# Patient Record
Sex: FEMALE | Race: WHITE | Employment: OTHER | URBAN - METROPOLITAN AREA
[De-identification: names, ages, dates, MRNs, and addresses within clinical notes are randomized per-mention and may not be internally consistent; named-entity substitution may affect disease eponyms.]

---

## 2019-01-16 ENCOUNTER — OP HISTORICAL/CONVERTED ENCOUNTER (OUTPATIENT)
Dept: OTHER | Age: 54
End: 2019-01-16

## 2019-02-01 ENCOUNTER — OP HISTORICAL/CONVERTED ENCOUNTER (OUTPATIENT)
Dept: OTHER | Age: 54
End: 2019-02-01

## 2019-02-06 ENCOUNTER — OP HISTORICAL/CONVERTED ENCOUNTER (OUTPATIENT)
Dept: OTHER | Age: 54
End: 2019-02-06

## 2021-03-08 ENCOUNTER — HOSPITAL ENCOUNTER (EMERGENCY)
Age: 56
Discharge: HOME | End: 2021-03-08
Attending: EMERGENCY MEDICINE
Payer: MEDICARE

## 2021-03-08 VITALS
BODY MASS INDEX: 19.63 KG/M2 | WEIGHT: 115 LBS | RESPIRATION RATE: 18 BRPM | HEIGHT: 64 IN | SYSTOLIC BLOOD PRESSURE: 125 MMHG | HEART RATE: 109 BPM | OXYGEN SATURATION: 98 % | TEMPERATURE: 98.1 F | DIASTOLIC BLOOD PRESSURE: 67 MMHG

## 2021-03-08 VITALS
SYSTOLIC BLOOD PRESSURE: 148 MMHG | RESPIRATION RATE: 18 BRPM | HEART RATE: 89 BPM | OXYGEN SATURATION: 99 % | TEMPERATURE: 98.3 F | BODY MASS INDEX: 19.63 KG/M2 | WEIGHT: 115 LBS | DIASTOLIC BLOOD PRESSURE: 54 MMHG | HEIGHT: 64 IN

## 2021-03-08 DIAGNOSIS — Z87.898 H/O URINARY FREQUENCY: Primary | ICD-10-CM

## 2021-03-08 DIAGNOSIS — R35.0 URINARY FREQUENCY: Primary | ICD-10-CM

## 2021-03-08 LAB
APPEARANCE UR: CLEAR
APPEARANCE UR: CLEAR
BACTERIA URNS QL MICRO: NEGATIVE /HPF
BACTERIA URNS QL MICRO: NEGATIVE /HPF
BILIRUB UR QL: NEGATIVE
BILIRUB UR QL: NEGATIVE
COLOR UR: ABNORMAL
COLOR UR: YELLOW
EPITH CASTS URNS QL MICRO: ABNORMAL /LPF
EPITH CASTS URNS QL MICRO: ABNORMAL /LPF
GLUCOSE UR STRIP.AUTO-MCNC: NEGATIVE MG/DL
GLUCOSE UR STRIP.AUTO-MCNC: NEGATIVE MG/DL
HGB UR QL STRIP: NEGATIVE
HGB UR QL STRIP: NEGATIVE
KETONES UR QL STRIP.AUTO: NEGATIVE MG/DL
KETONES UR QL STRIP.AUTO: NEGATIVE MG/DL
LEUKOCYTE ESTERASE UR QL STRIP.AUTO: NEGATIVE
LEUKOCYTE ESTERASE UR QL STRIP.AUTO: NEGATIVE
MUCOUS THREADS URNS QL MICRO: ABNORMAL /LPF
NITRITE UR QL STRIP.AUTO: NEGATIVE
NITRITE UR QL STRIP.AUTO: NEGATIVE
PH UR STRIP: 5 [PH] (ref 5–8)
PH UR STRIP: 7 [PH] (ref 5–8)
PROT UR STRIP-MCNC: NEGATIVE MG/DL
PROT UR STRIP-MCNC: NEGATIVE MG/DL
RBC #/AREA URNS HPF: ABNORMAL /HPF (ref 0–5)
RBC #/AREA URNS HPF: ABNORMAL /HPF (ref 0–5)
SP GR UR REFRACTOMETRY: 1 (ref 1–1.03)
SP GR UR REFRACTOMETRY: 1.02 (ref 1–1.03)
UA: UC IF INDICATED,UAUC: ABNORMAL
UROBILINOGEN UR QL STRIP.AUTO: 0.1 EU/DL (ref 0.2–1)
UROBILINOGEN UR QL STRIP.AUTO: 0.1 EU/DL (ref 0.2–1)
WBC URNS QL MICRO: ABNORMAL /HPF (ref 0–4)
WBC URNS QL MICRO: ABNORMAL /HPF (ref 0–4)

## 2021-03-08 PROCEDURE — 81001 URINALYSIS AUTO W/SCOPE: CPT

## 2021-03-08 PROCEDURE — 81003 URINALYSIS AUTO W/O SCOPE: CPT

## 2021-03-08 PROCEDURE — 99283 EMERGENCY DEPT VISIT LOW MDM: CPT

## 2021-03-08 RX ORDER — SPIRONOLACTONE 50 MG/1
TABLET, FILM COATED ORAL DAILY
COMMUNITY

## 2021-03-08 RX ORDER — DIVALPROEX SODIUM 250 MG/1
250 TABLET, DELAYED RELEASE ORAL 2 TIMES DAILY
Status: ON HOLD | COMMUNITY
End: 2021-07-23

## 2021-03-08 RX ORDER — GABAPENTIN 400 MG/1
800 CAPSULE ORAL 3 TIMES DAILY
Status: ON HOLD | COMMUNITY
End: 2021-07-23

## 2021-03-08 RX ORDER — TOLTERODINE 2 MG/1
2 CAPSULE, EXTENDED RELEASE ORAL DAILY
Qty: 30 CAP | Refills: 0 | Status: SHIPPED | OUTPATIENT
Start: 2021-03-08 | End: 2021-04-14

## 2021-03-08 RX ORDER — BACLOFEN 10 MG/1
10 TABLET ORAL 3 TIMES DAILY
COMMUNITY

## 2021-03-08 NOTE — ED PROVIDER NOTES
EMERGENCY DEPARTMENT HISTORY AND PHYSICAL EXAM      Date: 3/8/2021  Patient Name: Jorge Baker    History of Presenting Illness     Chief Complaint   Patient presents with    Urinary Frequency       History Provided By: Patient    HPI: Jorge Baker, 61 y.o. female with a past medical history significant No significant past medical history presents to the ED with cc of urinary frequency. Patient went to outside facility on February 22 and was treated for UTI with CEF URL X IMV 20 to 50 mg she took 2 pills and developed diarrhea so on 24 February she is discontinued it. They then placed her on Augmentin but again stopped after about 3 days for diarrhea. On the fifth she was then placed on amoxicillin 500 mg. She developed loose stool each of the last 2 days with some blood and difficulty urinating. She is here because she is having trouble producing urine although denies any bladder fullness or pressure sensation. She denies fevers chills dizziness lightheadedness cough shortness of breath or chest pain. Pt has had cystoscopy 20 plus years ago to dilate her ureter. She     There are no other complaints, changes, or physical findings at this time. PCP: Jesica Marie MD    No current facility-administered medications on file prior to encounter. Current Outpatient Medications on File Prior to Encounter   Medication Sig Dispense Refill    teriflunomide (Aubagio) 7 mg tablet Take 7 mg by mouth daily.  baclofen (LIORESAL) 10 mg tablet Take 10 mg by mouth three (3) times daily. 3 in the morning; 3 at noon, 3 in the evening      spironolactone (ALDACTONE) 50 mg tablet Take  by mouth daily. One at noon and 2 in the evening      divalproex DR (Depakote) 250 mg tablet Take 250 mg by mouth three (3) times daily. 2 in the morning; 2 in the evening      gabapentin (NEURONTIN) 400 mg capsule Take 400 mg by mouth three (3) times daily.  3 in the morning; 3 at noon, 3 in the evening         Past History Past Medical History:  No pertinent PMHX. Past Surgical History:  No pertinent surgical history. Family History:  History reviewed. No pertinent family history. Social History:  Denies smoking, alcohol or drug use. Allergies: Allergies   Allergen Reactions    Ciprofloxacin Rash    Fentanyl Rash    Nitrofurantoin Rash    Nortriptyline Vertigo    Prozac [Fluoxetine] Rash    Sulfa (Sulfonamide Antibiotics) Rash    Wellbutrin [Bupropion] Rash    Zofran [Ondansetron Hcl] Other (comments)     constipation         Review of Systems     Review of Systems   Constitutional: Negative for activity change, appetite change, fatigue and fever. HENT: Negative. Negative for congestion, rhinorrhea and sore throat. Respiratory: Negative. Negative for cough, shortness of breath and wheezing. Cardiovascular: Negative. Negative for chest pain and leg swelling. Gastrointestinal: Negative. Negative for abdominal distention, abdominal pain, constipation, diarrhea, nausea and vomiting. Endocrine: Negative. Genitourinary: Positive for frequency and urgency. Negative for difficulty urinating, dysuria, menstrual problem, vaginal bleeding and vaginal discharge. Musculoskeletal: Negative. Negative for arthralgias, joint swelling and myalgias. Skin: Negative. Negative for rash. Neurological: Negative. Negative for dizziness, weakness, light-headedness and headaches. Psychiatric/Behavioral: Negative. Physical Exam     Physical Exam  Vitals signs and nursing note reviewed. Constitutional:       General: She is not in acute distress. HENT:      Head: Atraumatic. Eyes:      Conjunctiva/sclera: Conjunctivae normal.      Pupils: Pupils are equal, round, and reactive to light. Neck:      Musculoskeletal: Normal range of motion. Cardiovascular:      Rate and Rhythm: Normal rate and regular rhythm. Heart sounds: Normal heart sounds. No murmur.    Pulmonary:      Effort: Pulmonary effort is normal. No respiratory distress. Breath sounds: Normal breath sounds. No wheezing or rales. Chest:      Chest wall: No tenderness. Abdominal:      General: Bowel sounds are normal. There is no distension. Palpations: Abdomen is soft. Tenderness: There is no abdominal tenderness. There is no guarding or rebound. Musculoskeletal: Normal range of motion. Skin:     General: Skin is warm. Findings: No erythema or rash. Neurological:      Mental Status: She is alert and oriented to person, place, and time. Lab and Diagnostic Study Results     Labs -     Recent Results (from the past 12 hour(s))   URINALYSIS W/ REFLEX CULTURE    Collection Time: 03/08/21  8:15 AM    Specimen: Urine   Result Value Ref Range    Color Yellow      Appearance Clear Clear      Specific gravity 1.020 1.003 - 1.030      pH (UA) 5.0 5.0 - 8.0      Protein Negative Negative mg/dL    Glucose Negative Negative mg/dL    Ketone Negative Negative mg/dL    Bilirubin Negative Negative      Blood Negative Negative      Urobilinogen 0.1 (L) 0.2 - 1.0 EU/dL    Nitrites Negative Negative      Leukocyte Esterase Negative Negative      WBC 0-4 0 - 4 /hpf    RBC 0-5 0 - 5 /hpf    Epithelial cells Few Few /lpf    Bacteria Negative Negative /hpf    UA:UC IF INDICATED Culture not indicated by UA result Culture not indicated by UA result      Mucus 2+ (A) Negative /lpf       Radiologic Studies -   @lastxrresult@  CT Results  (Last 48 hours)    None        CXR Results  (Last 48 hours)    None            Medical Decision Making   - I am the first provider for this patient. - I reviewed the vital signs, available nursing notes, past medical history, past surgical history, family history and social history. - Initial assessment performed. The patients presenting problems have been discussed, and they are in agreement with the care plan formulated and outlined with them.   I have encouraged them to ask questions as they arise throughout their visit. Vital Signs-Reviewed the patient's vital signs. Patient Vitals for the past 12 hrs:   Temp Pulse Resp BP SpO2   03/08/21 0807 98.1 °F (36.7 °C) (!) 109 18 125/67 98 %       Records Reviewed: Nursing Notes    The patient presents with Urinary frequency with a differential diagnosis of UTI. ED Course:          Provider Notes (Medical Decision Making):     MDM UTI, low suspicion for STD, no abd pain, no vaginal discharge. Will obtian UA. Will obtain culture results from [facility] should we need to re-prescribe antibiotics. Pt has been on three antibiotics in past three weeks. Believe her sense of not being able to urinate more likely decreased po intake and maybe some loss w diarrhea as she has no bladder fullness or pressure. Procedures       Disposition   Disposition: DC- Adult Discharges: All of the diagnostic tests were reviewed and questions answered. Diagnosis, care plan and treatment options were discussed. The patient understands the instructions and will follow up as directed. The patients results have been reviewed with them. They have been counseled regarding their diagnosis. The patient verbally convey understanding and agreement of the signs, symptoms, diagnosis, treatment and prognosis and additionally agrees to follow up as recommended with their PCP in 24 - 48 hours. They also agree with the care-plan and convey that all of their questions have been answered. I have also put together some discharge instructions for them that include: 1) educational information regarding their diagnosis, 2) how to care for their diagnosis at home, as well a 3) list of reasons why they would want to return to the ED prior to their follow-up appointment, should their condition change. DISCHARGE PLAN:  1. There are no discharge medications for this patient.     2.   Follow-up Information     Follow up With Specialties Details Why Contact Serenity Nunez., MD Urology In 2 days  [place] [id number]  [phone]      [address] Emergency Medicine  As needed, If symptoms worsen [address]  [phone]        3. Return to ED if worse   4. Current Discharge Medication List            Diagnosis     Clinical Impression:   1. H/O urinary frequency        Attestations:    Oscar Hernandez MD    Please note that this dictation was completed with [employer], the computer voice recognition software. Quite often unanticipated grammatical, syntax, homophones, and other interpretive errors are inadvertently transcribed by the computer software. Please disregard these errors. Please excuse any errors that have escaped final proofreading. Thank you.

## 2021-03-08 NOTE — ED TRIAGE NOTES
Is on her third round of antibiotics that she started on Friday for a urinary tract infection; now feels like she cannot void

## 2021-03-09 NOTE — ED PROVIDER NOTES
EMERGENCY DEPARTMENT HISTORY AND PHYSICAL EXAM      Date: 3/8/2021  Patient Name: Casandra Alfaro    History of Presenting Illness     Chief Complaint   Patient presents with    Urinary Retention       History Provided By: Patient    HPI: Casandra Alfaro, 61 y.o. female   presents to the ED with cc of urinary frequency. Patient was seen earlier in this emergency for the same came back to the emergency room with a persistent urinary frequency for last several days. Patient has history of multiple UTI and last time that she was treated with antibiotic for UTI was several weeks ago. No dysuria. No vaginal discharge. No hematuria. No back pain. No fever chills. Patient was discharged earlier without antibiotic. Patient has history of MS without exacerbation for many years. PCP: Santana Phelan MD    No current facility-administered medications on file prior to encounter. Current Outpatient Medications on File Prior to Encounter   Medication Sig Dispense Refill    teriflunomide (Aubagio) 7 mg tablet Take 7 mg by mouth daily.  baclofen (LIORESAL) 10 mg tablet Take 10 mg by mouth three (3) times daily. 3 in the morning; 3 at noon, 3 in the evening      spironolactone (ALDACTONE) 50 mg tablet Take  by mouth daily. One at noon and 2 in the evening      divalproex DR (Depakote) 250 mg tablet Take 250 mg by mouth three (3) times daily. 2 in the morning; 2 in the evening      gabapentin (NEURONTIN) 400 mg capsule Take 400 mg by mouth three (3) times daily. 3 in the morning; 3 at noon, 3 in the evening         Past History     Past Medical History:  Past Medical History:   Diagnosis Date    Arthritis     GERD (gastroesophageal reflux disease)     MS (multiple sclerosis) ([facility] [address]        Past Surgical History:  No past surgical history on file. Family History:  History reviewed. No pertinent family history.     Social History:  Social History     Tobacco Use    Smoking status: Never Smoker    Smokeless tobacco: Never Used   Substance Use Topics    Alcohol use: Never     Frequency: Never    Drug use: Never       Allergies: Allergies   Allergen Reactions    Ciprofloxacin Rash    Fentanyl Rash    Nitrofurantoin Rash    Nortriptyline Vertigo    Prozac [Fluoxetine] Rash    Sulfa (Sulfonamide Antibiotics) Rash    Wellbutrin [Bupropion] Rash    Zofran [Ondansetron Hcl] Other (comments)     constipation         Review of Systems   Review of Systems   Constitutional: Negative for chills and fever. Respiratory: Negative for shortness of breath. Cardiovascular: Negative for chest pain. Gastrointestinal: Negative for nausea. Endocrine: Positive for polyuria. Genitourinary: Negative for dysuria. Skin: Negative for rash. Neurological: Negative for headaches. Physical Exam   Physical Exam  Vitals signs and nursing note reviewed. Constitutional:       Appearance: Normal appearance. HENT:      Head: Normocephalic and atraumatic. Nose: Nose normal.      Mouth/Throat:      Mouth: Mucous membranes are moist.      Pharynx: Oropharynx is clear. Eyes:      Conjunctiva/sclera: Conjunctivae normal.   Neck:      Musculoskeletal: Neck supple. Cardiovascular:      Rate and Rhythm: Normal rate and regular rhythm. Heart sounds: Normal heart sounds. Pulmonary:      Effort: Pulmonary effort is normal.      Breath sounds: Normal breath sounds. Abdominal:      General: Abdomen is flat. Bowel sounds are normal.      Palpations: Abdomen is soft. Musculoskeletal:      Right lower leg: No edema. Left lower leg: No edema. Skin:     General: Skin is warm and dry. Neurological:      General: No focal deficit present. Mental Status: She is alert and oriented to person, place, and time.    Psychiatric:         Mood and Affect: Mood normal.         Diagnostic Study Results     Labs -     Recent Results (from the past 12 hour(s))   URINALYSIS W/ RFLX MICROSCOPIC    Collection Time: 03/08/21  7:15 PM   Result Value Ref Range    Color Yellow/Straw      Appearance Clear Clear      Specific gravity 1.005 1.003 - 1.030      pH (UA) 7.0 5.0 - 8.0      Protein Negative Negative mg/dL    Glucose Negative Negative mg/dL    Ketone Negative Negative mg/dL    Bilirubin Negative Negative      Blood Negative Negative      Urobilinogen 0.1 (L) 0.2 - 1.0 EU/dL    Nitrites Negative Negative      Leukocyte Esterase Negative Negative      WBC 0-4 0 - 4 /hpf    RBC 0-5 0 - 5 /hpf    Epithelial cells Few Few /lpf    Bacteria Negative Negative /hpf       Radiologic Studies -   No orders to display     CT Results  (Last 48 hours)    None        CXR Results  (Last 48 hours)    None            Medical Decision Making   I am the first provider for this patient. I reviewed the vital signs, available nursing notes, past medical history, past surgical history, family history and social history. Vital Signs-Reviewed the patient's vital signs. Patient Vitals for the past 12 hrs:   Temp Pulse Resp BP SpO2   03/08/21 1855 98.3 °F (36.8 °C) 89 18 (!) 148/54 99 %       Records Reviewed:     Provider Notes (Medical Decision Making):       ED Course:   Initial assessment performed. The patients presenting problems have been discussed, and they are in agreement with the care plan formulated and outlined with them. I have encouraged them to ask questions as they arise throughout their visit. The Bladder scan before urination was 150 mL. The Post void bladder scan was 0 ml      PROCEDURES      Disposition: Condition stable   DC- Adult Discharges: All of the diagnostic tests were reviewed and questions answered. Diagnosis, care plan and treatment options were discussed. understand instructions and will follow up as directed. The patients results have been reviewed with them. They have been counseled regarding their diagnosis.   The patient verbally convey understanding and agreement of the signs, symptoms, diagnosis, treatment and prognosis and additionally agrees to follow up as recommended. They also agree with the care-plan and convey that all of their questions have been answered. I have also put together some discharge instructions for them that include: 1) educational information regarding their diagnosis, 2) how to care for their diagnosis at home, as well a 3) list of reasons why they would want to return to the ED prior to their follow-up appointment, should their condition change. PLAN:  1. Current Discharge Medication List      START taking these medications    Details   tolterodine ER (Detrol LA) 2 mg ER capsule Take 1 Cap by mouth daily. Qty: 30 Cap, Refills: 0           2. Follow-up Information     Follow up With Specialties Details Why Contact Info    Follow up with your primary care physician  Schedule an appointment as soon as possible for a visit in 3 days As needed         Return to ED if worse     Diagnosis     Clinical Impression:   1. Urinary frequency        Please note that this dictation was completed with [employer], the computer voice recognition software. Quite often unanticipated grammatical, syntax, homophones, and other interpretive errors are inadvertently transcribed by the computer software. Please disregard these errors. Please excuse any errors that have escaped final proofreading. Thank you.

## 2021-03-12 ENCOUNTER — HOSPITAL ENCOUNTER (EMERGENCY)
Age: 56
Discharge: HOME | End: 2021-03-12
Attending: EMERGENCY MEDICINE
Payer: MEDICARE

## 2021-03-12 VITALS
TEMPERATURE: 98.1 F | OXYGEN SATURATION: 99 % | SYSTOLIC BLOOD PRESSURE: 116 MMHG | DIASTOLIC BLOOD PRESSURE: 63 MMHG | BODY MASS INDEX: 19.63 KG/M2 | RESPIRATION RATE: 18 BRPM | HEIGHT: 64 IN | HEART RATE: 110 BPM | WEIGHT: 115 LBS

## 2021-03-12 DIAGNOSIS — N39.0 URINARY TRACT INFECTION WITHOUT HEMATURIA, SITE UNSPECIFIED: Primary | ICD-10-CM

## 2021-03-12 LAB
AMORPH CRY URNS QL MICRO: ABNORMAL
APPEARANCE UR: CLEAR
BACTERIA URNS QL MICRO: ABNORMAL /HPF
BILIRUB UR QL: NEGATIVE
COLOR UR: YELLOW
EPITH CASTS URNS QL MICRO: ABNORMAL /LPF
GLUCOSE UR STRIP.AUTO-MCNC: NEGATIVE MG/DL
HGB UR QL STRIP: NEGATIVE
KETONES UR QL STRIP.AUTO: NEGATIVE MG/DL
LEUKOCYTE ESTERASE UR QL STRIP.AUTO: ABNORMAL
NITRITE UR QL STRIP.AUTO: NEGATIVE
PH UR STRIP: 5 [PH] (ref 5–8)
PROT UR STRIP-MCNC: NEGATIVE MG/DL
RBC #/AREA URNS HPF: ABNORMAL /HPF (ref 0–5)
SP GR UR REFRACTOMETRY: 1.02 (ref 1–1.03)
UA: UC IF INDICATED,UAUC: ABNORMAL
UROBILINOGEN UR QL STRIP.AUTO: 0.1 EU/DL (ref 0.2–1)
WBC URNS QL MICRO: ABNORMAL /HPF (ref 0–4)

## 2021-03-12 PROCEDURE — 81001 URINALYSIS AUTO W/SCOPE: CPT

## 2021-03-12 PROCEDURE — 51798 US URINE CAPACITY MEASURE: CPT

## 2021-03-12 PROCEDURE — 99284 EMERGENCY DEPT VISIT MOD MDM: CPT

## 2021-03-12 RX ORDER — PHENAZOPYRIDINE HYDROCHLORIDE 200 MG/1
200 TABLET, FILM COATED ORAL 3 TIMES DAILY
Qty: 6 TAB | Refills: 0 | Status: SHIPPED | OUTPATIENT
Start: 2021-03-12 | End: 2021-03-14

## 2021-03-12 RX ORDER — CEPHALEXIN 500 MG/1
500 CAPSULE ORAL 2 TIMES DAILY
Qty: 14 CAP | Refills: 0 | Status: SHIPPED | OUTPATIENT
Start: 2021-03-12 | End: 2021-03-19

## 2021-03-12 NOTE — ED TRIAGE NOTES
\" I have had urinary retention, I have been having UTI's and have a urology appointment in April but I think its worse \"

## 2021-03-12 NOTE — DISCHARGE INSTRUCTIONS
Take the cephalexin as prescribed for a urinary tract infection. Take the pyridium as prescribed. Drink plenty of fluids. Follow-up with Urology as soon as possible. Also recommend that you take probiotics to help prevent diarrhea while taking antibiotics.

## 2021-03-12 NOTE — ED PROVIDER NOTES
EMERGENCY DEPARTMENT HISTORY AND PHYSICAL EXAM      Date: 3/12/2021  Patient Name: Nohemi Yepez    History of Presenting Illness     Chief Complaint   Patient presents with    Urinary Retention       History Provided By: Patient    HPI: Nohemi Yepez, 61 y.o. female with a past medical history significant MS presents to the ED with cc of difficultly urinating for the past 5 days. Patient reports mild dysuria. Reports urinary urgency, suprapubic pressure with urination and having to strain to urinate. Has been on 3 courses of abx since 2/26/21 but has not been able to finish an entire course due to diarrhea. Denies fevers, chills, nausea, or vomiting. Denies abdominal pain or back/flank pain. States her neurologist instructed her to stop detrol prescribed at ED on 3/8/21. Has an appointment with a Urologist at the end of next month. There are no other complaints, changes, or physical findings at this time. PCP: Jeannette Newman MD    No current facility-administered medications on file prior to encounter. Current Outpatient Medications on File Prior to Encounter   Medication Sig Dispense Refill    teriflunomide (Aubagio) 7 mg tablet Take 7 mg by mouth daily.  baclofen (LIORESAL) 10 mg tablet Take 10 mg by mouth three (3) times daily. 3 in the morning; 3 at noon, 3 in the evening      spironolactone (ALDACTONE) 50 mg tablet Take  by mouth daily. One at noon and 2 in the evening      divalproex DR (Depakote) 250 mg tablet Take 250 mg by mouth three (3) times daily. 2 in the morning; 2 in the evening      gabapentin (NEURONTIN) 400 mg capsule Take 400 mg by mouth three (3) times daily. 3 in the morning; 3 at noon, 3 in the evening      tolterodine ER (Detrol LA) 2 mg ER capsule Take 1 Cap by mouth daily.  30 Cap 0       Past History     Past Medical History:  Past Medical History:   Diagnosis Date    Arthritis     GERD (gastroesophageal reflux disease)     [facility]     UTI (urinary tract infection)        Past Surgical History:  History reviewed. No pertinent surgical history. Family History:  History reviewed. No pertinent family history. Social History:  Social History     Tobacco Use    Smoking status: Never Smoker    Smokeless tobacco: Never Used   Substance Use Topics    Alcohol use: Never     Frequency: Never    Drug use: Never       Allergies: Allergies   Allergen Reactions    Ciprofloxacin Rash    Fentanyl Rash    Nitrofurantoin Rash    Nortriptyline Vertigo    Prozac [Fluoxetine] Rash    Sulfa (Sulfonamide Antibiotics) Rash    Wellbutrin [Bupropion] Rash    Zofran [Ondansetron Hcl] Other (comments)     constipation         Review of Systems   Review of Systems   Constitutional: Negative for chills and fever. HENT: Negative for congestion and sore throat. Eyes: Negative for pain and redness. Respiratory: Negative for cough and shortness of breath. Cardiovascular: Negative for chest pain and leg swelling. Gastrointestinal: Positive for diarrhea. Negative for abdominal pain, nausea and vomiting. Diarrhea when taking the antibiotics   Genitourinary: Positive for dysuria and urgency. Negative for hematuria. Musculoskeletal: Negative for back pain and myalgias. Skin: Negative for pallor and rash. Neurological: Negative for dizziness, numbness and headaches. Endo/Heme/Allergies: Negative for cold intolerance and heat intolerance. Psychiatric/Behavioral: Negative for agitation and dysphoric mood. Physical Exam     Physical Exam  Vitals signs and nursing note reviewed. Constitutional:       General: She is not in acute distress. Appearance: Normal appearance. She is not ill-appearing or toxic-appearing. HENT:      Head: Normocephalic and atraumatic. Mouth/Throat:      Mouth: Mucous membranes are moist.      Pharynx: Oropharynx is clear. Eyes:      Extraocular Movements: Extraocular movements intact. Conjunctiva/sclera: Conjunctivae normal.      Pupils: Pupils are equal, round, and reactive to light. Neck:      Musculoskeletal: Neck supple. No neck rigidity or muscular tenderness. Cardiovascular:      Rate and Rhythm: Normal rate and regular rhythm. Pulses: Normal pulses. Heart sounds: Normal heart sounds. Pulmonary:      Effort: Pulmonary effort is normal.      Breath sounds: Normal breath sounds. Abdominal:      General: Abdomen is flat. There is no distension. Palpations: Abdomen is soft. There is no mass. Tenderness: There is no abdominal tenderness. There is no guarding or rebound. Musculoskeletal: Normal range of motion. General: No swelling or tenderness. Lymphadenopathy:      Cervical: No cervical adenopathy. Skin:     General: Skin is warm and dry. Findings: No erythema or rash. Neurological:      General: No focal deficit present. Mental Status: She is alert and oriented to person, place, and time.    Psychiatric:         Behavior: Behavior normal.         Diagnostic Study Results     Labs -     Recent Results (from the past 12 hour(s))   URINALYSIS W/ REFLEX CULTURE    Collection Time: 03/12/21  8:45 AM    Specimen: Urine   Result Value Ref Range    Color Yellow      Appearance Clear Clear      Specific gravity 1.020 1.003 - 1.030      pH (UA) 5.0 5.0 - 8.0      Protein Negative Negative mg/dL    Glucose Negative Negative mg/dL    Ketone Negative Negative mg/dL    Bilirubin Negative Negative      Blood Negative Negative      Urobilinogen 0.1 (L) 0.2 - 1.0 EU/dL    Nitrites Negative Negative      Leukocyte Esterase Large (A) Negative      WBC 5-10 0 - 4 /hpf    RBC 0-5 0 - 5 /hpf    Epithelial cells Moderate (A) Few /lpf    Bacteria 1+ (A) Negative /hpf    UA:UC IF INDICATED Culture not indicated by UA result Culture not indicated by UA result      Amorphous Crystals Few (A) Negative         Radiologic Studies -   @lastxrresult@  CT Results  (Last 48 hours)    None        CXR Results  (Last 48 hours)    None            Medical Decision Making   I am the first provider for this patient. I reviewed the vital signs, available nursing notes, past medical history, past surgical history, family history and social history. Vital Signs-Reviewed the patient's vital signs. Patient Vitals for the past 12 hrs:   Temp Pulse Resp BP SpO2   03/12/21 0825 98.1 °F (36.7 °C) (!) 110 18 116/63 99 %       Records Reviewed: Nursing Notes          Provider Notes (Medical Decision Making):   Bladder scan revealed 40cc urine in the bladder. Patient states only drinking enough water to take her medications. Last 2 UA on 3/8 did not reveal UTI. Patient needs to see a Urologist. Would recommend that she speaks to her PCP who she has not yet seen for this issue can help her get an appointment that is sooner than the end of next month. [facility]         ED Course:   Initial assessment performed. The patients presenting problems have been discussed, and they are in agreement with the care plan formulated and outlined with them. I have encouraged them to ask questions as they arise throughout their visit. PROCEDURES  Procedures       PLAN:  1. Current Discharge Medication List      START taking these medications    Details   cephALEXin (Keflex) 500 mg capsule Take 1 Cap by mouth two (2) times a day for 7 days. Qty: 14 Cap, Refills: 0      phenazopyridine (Pyridium) 200 mg tablet Take 1 Tab by mouth three (3) times daily for 2 days. Qty: 6 Tab, Refills: 0           2. Follow-up Information     Follow up With Specialties Details Why [address] [facility]  In 3 days  [address]  Whitney Faye MD Family Medicine In 3 days  [address] [facility]  [id number] [phone]          Return to ED if worse     Diagnosis     Clinical Impression:   1.  Urinary tract infection without hematuria, site unspecified

## 2021-03-12 NOTE — ED NOTES
Pt reports her neurologist advised her to stop taking the tolterodine tart 2mg ER for bladder spasms because it may have made the retention worse.

## 2021-03-27 ENCOUNTER — HOSPITAL ENCOUNTER (EMERGENCY)
Age: 56
Discharge: HOME | End: 2021-03-27
Attending: FAMILY MEDICINE
Payer: MEDICARE

## 2021-03-27 VITALS
RESPIRATION RATE: 18 BRPM | SYSTOLIC BLOOD PRESSURE: 101 MMHG | BODY MASS INDEX: 21.18 KG/M2 | TEMPERATURE: 98.4 F | WEIGHT: 115.08 LBS | HEIGHT: 62 IN | OXYGEN SATURATION: 99 % | DIASTOLIC BLOOD PRESSURE: 74 MMHG | HEART RATE: 104 BPM

## 2021-03-27 DIAGNOSIS — R30.0 DYSURIA: Primary | ICD-10-CM

## 2021-03-27 LAB
APPEARANCE UR: CLEAR
BACTERIA URNS QL MICRO: NEGATIVE /HPF
BILIRUB UR QL: NEGATIVE
COLOR UR: YELLOW
EPITH CASTS URNS QL MICRO: ABNORMAL /LPF
GLUCOSE UR STRIP.AUTO-MCNC: NEGATIVE MG/DL
HGB UR QL STRIP: NEGATIVE
KETONES UR QL STRIP.AUTO: NEGATIVE MG/DL
LEUKOCYTE ESTERASE UR QL STRIP.AUTO: NEGATIVE
NITRITE UR QL STRIP.AUTO: NEGATIVE
PH UR STRIP: 6 [PH] (ref 5–8)
PROT UR STRIP-MCNC: NEGATIVE MG/DL
RBC #/AREA URNS HPF: ABNORMAL /HPF (ref 0–5)
SP GR UR REFRACTOMETRY: 1.01 (ref 1–1.03)
UA: UC IF INDICATED,UAUC: ABNORMAL
UROBILINOGEN UR QL STRIP.AUTO: 0.1 EU/DL (ref 0.2–1)
WBC URNS QL MICRO: ABNORMAL /HPF (ref 0–4)

## 2021-03-27 PROCEDURE — 81001 URINALYSIS AUTO W/SCOPE: CPT

## 2021-03-27 PROCEDURE — 99283 EMERGENCY DEPT VISIT LOW MDM: CPT

## 2021-03-27 NOTE — ED PROVIDER NOTES
EMERGENCY DEPARTMENT HISTORY AND PHYSICAL EXAM      Date: 3/27/2021  Patient Name: Tuan Donis    History of Presenting Illness     Chief Complaint   Patient presents with    Bladder Infection       History Provided By:     HPI: Tuan Donis, is an extremely pleasant 61 y.o. female presenting to the ED with a chief complaint of dysuria. She states she has had 3 urinary tract infections over the last month. She states this feels similar. She states she has painful urination and a sensation of fullness in her bladder however she is able to void without much difficulty. She denies any fevers, chills nor rigors. She denies any flank pain. She denies any abdominal pain. She denies vaginal discharge. She has not been experiencing any hematuria. She states she currently has an appointment with her urologist scheduled within the next week. She denies drugs alcohol or tobacco.    There are no other complaints, changes, or physical findings at this time. PCP: Thalia Henderson MD    No current facility-administered medications on file prior to encounter. Current Outpatient Medications on File Prior to Encounter   Medication Sig Dispense Refill    teriflunomide (Aubagio) 7 mg tablet Take 7 mg by mouth daily.  baclofen (LIORESAL) 10 mg tablet Take 10 mg by mouth three (3) times daily. 3 in the morning; 3 at noon, 3 in the evening      spironolactone (ALDACTONE) 50 mg tablet Take  by mouth daily. One at noon and 2 in the evening      divalproex DR (Depakote) 250 mg tablet Take 250 mg by mouth three (3) times daily. 2 in the morning; 2 in the evening      gabapentin (NEURONTIN) 400 mg capsule Take 400 mg by mouth three (3) times daily. 3 in the morning; 3 at noon, 3 in the evening      tolterodine ER (Detrol LA) 2 mg ER capsule Take 1 Cap by mouth daily.  30 Cap 0       Past History     Past Medical History:  Past Medical History:   Diagnosis Date    Arthritis     GERD (gastroesophageal reflux disease)     [facility]     UTI (urinary tract infection)        Past Surgical History:  History reviewed. No pertinent surgical history. Family History:  History reviewed. No pertinent family history. Social History:  Social History     Tobacco Use    Smoking status: Never Smoker    Smokeless tobacco: Never Used   Substance Use Topics    Alcohol use: Never     Frequency: Never    Drug use: Never       Allergies: Allergies   Allergen Reactions    Ciprofloxacin Rash    Fentanyl Rash    Nitrofurantoin Rash    Nortriptyline Vertigo    Prozac [Fluoxetine] Rash    Sulfa (Sulfonamide Antibiotics) Rash    Wellbutrin [Bupropion] Rash    Zofran [Ondansetron Hcl] Other (comments)     constipation         Review of Systems     Review of Systems   Constitutional: Negative for activity change, appetite change, chills, fatigue and fever. HENT: Negative for congestion and sore throat. Eyes: Negative for photophobia and visual disturbance. Respiratory: Negative for cough, shortness of breath and wheezing. Cardiovascular: Negative for chest pain, palpitations and leg swelling. Gastrointestinal: Negative for abdominal pain, diarrhea, nausea and vomiting. Endocrine: Negative for cold intolerance and heat intolerance. Genitourinary: Positive for dysuria. Negative for decreased urine volume, dyspareunia, flank pain, frequency, pelvic pain and urgency. Musculoskeletal: Negative for gait problem and joint swelling. Skin: Negative for color change and rash. Neurological: Negative for dizziness and headaches. Physical Exam     Physical Exam  Constitutional:       Appearance: She is well-developed. HENT:      Head: Normocephalic and atraumatic. Mouth/Throat:      Mouth: Mucous membranes are moist.      Pharynx: Oropharynx is clear. Eyes:      Conjunctiva/sclera: Conjunctivae normal.      Pupils: Pupils are equal, round, and reactive to light.    Neck: Musculoskeletal: Normal range of motion and neck supple. Cardiovascular:      Rate and Rhythm: Normal rate and regular rhythm. Heart sounds: No murmur. Pulmonary:      Effort: No respiratory distress. Breath sounds: No stridor. No wheezing, rhonchi or rales. Abdominal:      General: There is no distension. Tenderness: There is no abdominal tenderness. There is no guarding or rebound. Musculoskeletal:      Comments: No flank pain   Skin:     General: Skin is warm and dry. Neurological:      General: No focal deficit present. Mental Status: She is alert and oriented to person, place, and time. Psychiatric:         Mood and Affect: Mood normal.         Behavior: Behavior normal.         Lab and Diagnostic Study Results     Labs -     Recent Results (from the past 12 hour(s))   URINALYSIS W/ REFLEX CULTURE    Collection Time: 03/27/21  8:00 AM    Specimen: Urine   Result Value Ref Range    Color Yellow      Appearance Clear Clear      Specific gravity 1.010 1.003 - 1.030      pH (UA) 6.0 5.0 - 8.0      Protein Negative Negative mg/dL    Glucose Negative Negative mg/dL    Ketone Negative Negative mg/dL    Bilirubin Negative Negative      Blood Negative Negative      Urobilinogen 0.1 (L) 0.2 - 1.0 EU/dL    Nitrites Negative Negative      Leukocyte Esterase Negative Negative      WBC 0-4 0 - 4 /hpf    RBC 0-5 0 - 5 /hpf    Epithelial cells Few Few /lpf    Bacteria Negative Negative /hpf    UA:UC IF INDICATED Culture not indicated by UA result Culture not indicated by UA result         Radiologic Studies -   @lastxrresult@  CT Results  (Last 48 hours)    None        CXR Results  (Last 48 hours)    None            Medical Decision Making   - I am the first provider for this patient. - I reviewed the vital signs, available nursing notes, past medical history, past surgical history, family history and social history. - Initial assessment performed.  The patients presenting problems have been discussed, and they are in agreement with the care plan formulated and outlined with them. I have encouraged them to ask questions as they arise throughout their visit. Vital Signs-Reviewed the patient's vital signs. Patient Vitals for the past 12 hrs:   Temp Pulse Resp BP SpO2   03/27/21 0800 98.4 °F (36.9 °C) (!) 104 18 101/74 99 %         ED Course/ Provider Notes (Medical Decision Making):     Patient presented to the emergency department with a chief complaint of dysuria and sensation of full bladder. She is voiding without difficulty. She provided a sample with these. Urinalysis is completely normal.  I did offer her a bladder scan in light of her sensation that her bladder is full but she declines. She states she has an appointment with a urologist in 1 week. She understands the importance of making this appointment. She was discharged home in stable and amatory condition. Geovani Tafoya was given a thorough list of signs and symptoms that would warrant an immediate return to the emergency department. Otherwise Geovani Tafoya will follow up with PCP. Procedures   Medical Decision Makingedical Decision Making  Performed by: Julia Fontenot DO  Procedures  None       Disposition   Disposition:     Home     All of the diagnostic tests were reviewed and questions answered. Diagnosis, care plan and treatment options were discussed. The patient understands the instructions and will follow up as directed. The patients results have been reviewed with them. They have been counseled regarding their diagnosis. The patient verbally convey understanding and agreement of the signs, symptoms, diagnosis, treatment and prognosis and additionally agrees to follow up as recommended with their PCP in 24 - 48 hours. They also agree with the care-plan and convey that all of their questions have been answered.   I have also put together some discharge instructions for them that include: 1) educational information regarding their diagnosis, 2) how to care for their diagnosis at home, as well a 3) list of reasons why they would want to return to the ED prior to their follow-up appointment, should their condition change. DISCHARGE PLAN:    1. Current Discharge Medication List      CONTINUE these medications which have NOT CHANGED    Details   teriflunomide (Aubagio) 7 mg tablet Take 7 mg by mouth daily. baclofen (LIORESAL) 10 mg tablet Take 10 mg by mouth three (3) times daily. 3 in the morning; 3 at noon, 3 in the evening      spironolactone (ALDACTONE) 50 mg tablet Take  by mouth daily. One at noon and 2 in the evening      divalproex DR (Depakote) 250 mg tablet Take 250 mg by mouth three (3) times daily. 2 in the morning; 2 in the evening      gabapentin (NEURONTIN) 400 mg capsule Take 400 mg by mouth three (3) times daily. 3 in the morning; 3 at noon, 3 in the evening      tolterodine ER (Detrol LA) 2 mg ER capsule Take 1 Cap by mouth daily. Qty: 30 Cap, Refills: 0               2.   Follow-up Information     Follow up With Specialties Details Why Contact Info    Rick Rodriguez MD [facility] Schedule an appointment as soon as possible for a visit   [address] [facility] [place]  [phone]              3.  Return to ED if worse       4. Current Discharge Medication List            Diagnosis     Clinical Impression:    1. Dysuria        Attestations:    Anahy Vera, DO    Please note that this dictation was completed with [employer], the computer voice recognition software. Quite often unanticipated grammatical, syntax, homophones, and other interpretive errors are inadvertently transcribed by the computer software. Please disregard these errors. Please excuse any errors that have escaped final proofreading. Thank you.

## 2021-04-06 ENCOUNTER — OFFICE VISIT (OUTPATIENT)
Dept: UROLOGY | Age: 56
End: 2021-04-06
Payer: MEDICARE

## 2021-04-06 VITALS
HEART RATE: 104 BPM | SYSTOLIC BLOOD PRESSURE: 123 MMHG | WEIGHT: 117 LBS | OXYGEN SATURATION: 99 % | HEIGHT: 64 IN | DIASTOLIC BLOOD PRESSURE: 76 MMHG | BODY MASS INDEX: 19.97 KG/M2 | RESPIRATION RATE: 12 BRPM | TEMPERATURE: 97.1 F

## 2021-04-06 DIAGNOSIS — N39.0 URINARY TRACT INFECTION WITHOUT HEMATURIA, SITE UNSPECIFIED: Primary | ICD-10-CM

## 2021-04-06 LAB
BILIRUB UR QL STRIP: NEGATIVE
GLUCOSE UR-MCNC: NEGATIVE MG/DL
KETONES P FAST UR STRIP-MCNC: NORMAL MG/DL
PH UR STRIP: 6 [PH] (ref 4.6–8)
PROT UR QL STRIP: NEGATIVE
SP GR UR STRIP: 1.02 (ref 1–1.03)
UA UROBILINOGEN AMB POC: NORMAL (ref 0.2–1)
URINALYSIS CLARITY POC: CLEAR
URINALYSIS COLOR POC: YELLOW
URINE BLOOD POC: NEGATIVE
URINE LEUKOCYTES POC: NEGATIVE
URINE NITRITES POC: NEGATIVE

## 2021-04-06 PROCEDURE — G8420 CALC BMI NORM PARAMETERS: HCPCS | Performed by: UROLOGY

## 2021-04-06 PROCEDURE — G8510 SCR DEP NEG, NO PLAN REQD: HCPCS | Performed by: UROLOGY

## 2021-04-06 PROCEDURE — 81003 URINALYSIS AUTO W/O SCOPE: CPT | Performed by: UROLOGY

## 2021-04-06 PROCEDURE — G8427 DOCREV CUR MEDS BY ELIG CLIN: HCPCS | Performed by: UROLOGY

## 2021-04-06 PROCEDURE — 3017F COLORECTAL CA SCREEN DOC REV: CPT | Performed by: UROLOGY

## 2021-04-06 PROCEDURE — 99204 OFFICE O/P NEW MOD 45 MIN: CPT | Performed by: UROLOGY

## 2021-04-06 NOTE — PROGRESS NOTES
HPI ROS PE NOTE          History of Present Illness   Chief complaint; recurrent urinary tract infections  Laci Jo is a 61 y.o. female who presents with urinary tract infections since 22 February, [facility] mg twice daily but developed diarrhea and this was stopped. She was then treated with Augmentin then subsequently switched to amoxicillin. Principal symptoms are pelvic pain, \"\" heavy sensation\" in the lower abdomen and pelvis, she denies fever or chills nausea vomiting: She does have nocturia 3 times per night and urgency. Her infection symptoms appear to have cleared up and then recurred restarted 3 days ago. February 27th of follow-up urine showed negative for infection. She was seen in the emergency room March 27. At time her urine did not look infected and she was given a prescription for tolterodine extended release made her symptoms worse and made her made it difficult for her to urinate. Thought that she was having bladder spasms she was told. He was apparently offered a bladder scan in the emergency room which she declined. Patient has kept very nice notes about her course of symptoms and treatment sheet for which I have included in the record. On March 12 she was placed on cephalexin 500 mg twice daily and Pyridium to improve things evidently. Also relates that in 2003 the patient underwent a cystourethroscopy and urethral dilation by Dr. Simone Ruiz; she says she had a repeat lower procedure by Dr. Jossy Beckwith of [facility] October 2009, doing back further she relates that back to 2014 her last urinary tract infection prior to this recent episode was several years ago but she had a hard time getting rid of the infection back in 2014. He denies urgency incontinence but does experience some urgency and frequency not necessarily during infection that are actively treated. She is nulliparous.   [facility] as well as the emergency room reviewed exhaustively by me during this consultation today. Positive urine culture for E. coli was noted from February 22. She also has a number of significant patient allergies which limits available treatment options for UTIs. Past Medical History:   Diagnosis Date    Arthritis     GERD (gastroesophageal reflux disease)     MS (multiple sclerosis) (HCC)     UTI (urinary tract infection)       Past Surgical History:   Procedure Laterality Date    HX WISDOM TEETH EXTRACTION       Family History   Problem Relation Age of Onset    Hypertension Mother    Bryan.Devin Elevated Lipids Mother     Cancer Father         PROSTATE    Elevated Lipids Father       Social History     Tobacco Use    Smoking status: Never Smoker    Smokeless tobacco: Never Used   Substance Use Topics    Alcohol use: Never     Frequency: Never       Prior to Admission medications    Medication Sig Start Date End Date Taking? Authorizing Provider   teriflunomide (Aubagio) 7 mg tablet Take 7 mg by mouth daily. Yes Other, MD Jhonny   baclofen (LIORESAL) 10 mg tablet Take 10 mg by mouth three (3) times daily. 3 in the morning; 3 at noon, 3 in the evening   Yes Other, MD Jhonny   spironolactone (ALDACTONE) 50 mg tablet Take  by mouth daily. One at noon and 2 in the evening   Yes Rianna, MD Jhonny   divalproex DR (Depakote) 250 mg tablet Take 250 mg by mouth three (3) times daily. 2 in the morning; 2 in the evening   Yes Rianna, MD Jhonny   gabapentin (NEURONTIN) 400 mg capsule Take 400 mg by mouth three (3) times daily. 3 in the morning; 3 at noon, 3 in the evening   Yes Rianna, MD Jhonny   tolterodine ER (Detrol LA) 2 mg ER capsule Take 1 Cap by mouth daily.  3/8/21   Vincent Buck MD     Allergies   Allergen Reactions    Ciprofloxacin Rash    Fentanyl Rash    Nitrofurantoin Rash    Nortriptyline Vertigo    Prozac [Fluoxetine] Rash    Sulfa (Sulfonamide Antibiotics) Rash    Wellbutrin [Bupropion] Rash    Zofran [Ondansetron Hcl] Other (comments)     constipation        Review of Systems:  Constitutional: negative  Respiratory: negative  Cardiovascular: negative  Gastrointestinal: positive for constipation  Genitourinary:positive for frequency, nocturia and Recurrent infection and pelvic pain, dysuria  Integument/Breast: negative  Hematologic/Lymphatic: negative  Musculoskeletal:negative  Neurological: negative  Behavioral/Psychiatric: negative     Physical Exam     Physical Exam:   Visit Vitals  /76 (BP 1 Location: Right upper arm, BP Patient Position: Sitting, BP Cuff Size: Adult)   Pulse (!) 104   Temp 97.1 °F (36.2 °C) (Temporal)   Resp 12   Ht 5' 4\" (1.626 m)   Wt 117 lb (53.1 kg)   LMP  (LMP Unknown)   SpO2 99%   BMI 20.08 kg/m²     General appearance: alert, cooperative, no distress, appears stated age  Head: Normocephalic, without obvious abnormality, atraumatic  Nose: Nares normal. Septum midline. Mucosa normal. No drainage or sinus tenderness. Neck: supple, symmetrical, trachea midline, no adenopathy, thyroid: not enlarged, symmetric, no tenderness/mass/nodules, no carotid bruit and no JVD  Lungs: clear to auscultation bilaterally  Heart: regular rate and rhythm, S1, S2 normal, no murmur, click, rub or gallop  Abdomen: soft, non-tender.  Bowel sounds normal. No masses,  no organomegaly  Extremities: extremities normal, atraumatic, no cyanosis or edema  Pulses: 2+ and symmetric  Skin: Skin color, texture, turgor normal. No rashes or lesions  Neurologic: Grossly normal    Data Review:   Recent Results (from the past 48 hour(s))   AMB POC URINALYSIS DIP STICK AUTO W/O MICRO    Collection Time: 04/06/21  9:27 AM   Result Value Ref Range    Color (UA POC) Yellow     Clarity (UA POC) Clear     Glucose (UA POC) Negative Negative    Bilirubin (UA POC) Negative Negative    Ketones (UA POC) Trace Negative    Specific gravity (UA POC) 1.020 1.001 - 1.035    Blood (UA POC) Negative Negative    pH (UA POC) 6.0 4.6 - 8.0 Protein (UA POC) Negative Negative    Urobilinogen (UA POC) 0.2 mg/dL 0.2 - 1    Nitrites (UA POC) Negative Negative    Leukocyte esterase (UA POC) Negative Negative     No results for input(s): 48 in the last 72 hours. Assessment and Plan:   Recurrent urinary tract infections, possible bladder outlet obstruction with incomplete bladder emptying leading to these infections; recommend urine culture and sensitivity of today's specimen with treatment accordingly based on sensitivities; because of suspected bladder outlet obstruction, recommend cystoscopy urethral dilation and retrograde pyelograms to be performed in the near future at [facility] outpatient surgery: No urine specimen does not show pyuria or microhematuria, patient could still have infection and culture and sensitivity may diagnose this. Sensitive review of records [facility] room reviewed in detail as part of this visit as well as face-to-face history physical examination, counseling, description of treatment plan, scheduling of proposed surgery, 45 minutes duration. No problem-specific Assessment & Plan notes found for this encounter. Ms. Wesley Looney has a reminder for a \"due or due soon\" health maintenance. I have asked that she contact her primary care provider for follow-up on this health maintenance. Shaina Ferguson M.D.  4/6/2021

## 2021-04-06 NOTE — PROGRESS NOTES
Chief Complaint   Patient presents with    New Patient    Recurrent UTI       1. Have you been to the ER, urgent care clinic since your last visit? Hospitalized since your last visit? Yes [facility] Reason for visit: Dysuria    2. Have you seen or consulted any other health care providers outside of the [address] [place] [address] since your last visit? Include any pap smears or colon screening.  Yes When: 2/22/21 [facility]: [facility] Reason for visit: UTI    Visit Vitals  /76 (BP 1 Location: Right upper arm, BP Patient Position: Sitting, BP Cuff Size: Adult)   Pulse (!) 104   Temp 97.1 °F (36.2 °C) (Temporal)   Resp 12   Ht 5' 4\" (1.626 m)   Wt 117 lb (53.1 kg)   LMP  (LMP Unknown)   SpO2 99%   BMI 20.08 kg/m²

## 2021-04-06 NOTE — LETTER
4/6/2021 Patient: Kadeem Harris YOB: 1965 Date of Visit: 4/6/2021 Bettye Flores MD 
[address] [place] [address],[phone] Via Fax: [phone] Dear Bettye Flores MD, Thank you for referring Ms. Lito Cho to [place] [phone] for evaluation. My notes for this consultation are attached. If you have questions, please do not hesitate to call me. I look forward to following your patient along with you. Sincerely, Nelly Radford MD

## 2021-04-07 ENCOUNTER — HOSPITAL ENCOUNTER (OUTPATIENT)
Dept: PREADMISSION TESTING | Age: 56
Discharge: HOME | End: 2021-04-07
Payer: MEDICARE

## 2021-04-07 VITALS
WEIGHT: 116 LBS | OXYGEN SATURATION: 98 % | TEMPERATURE: 98.7 F | DIASTOLIC BLOOD PRESSURE: 71 MMHG | HEIGHT: 64 IN | BODY MASS INDEX: 19.81 KG/M2 | SYSTOLIC BLOOD PRESSURE: 146 MMHG | HEART RATE: 99 BPM | RESPIRATION RATE: 16 BRPM

## 2021-04-07 LAB
ANION GAP SERPL CALC-SCNC: 4 MMOL/L (ref 5–15)
BUN SERPL-MCNC: 14 MG/DL (ref 6–20)
BUN/CREAT SERPL: 19 (ref 12–20)
CA-I BLD-MCNC: 9.8 MG/DL (ref 8.5–10.1)
CHLORIDE SERPL-SCNC: 99 MMOL/L (ref 97–108)
CO2 SERPL-SCNC: 30 MMOL/L (ref 21–32)
CREAT SERPL-MCNC: 0.74 MG/DL (ref 0.55–1.02)
GLUCOSE SERPL-MCNC: 121 MG/DL (ref 65–100)
HCT VFR BLD AUTO: 44.8 % (ref 35–47)
HGB BLD-MCNC: 15.4 G/DL (ref 11.5–16)
POTASSIUM SERPL-SCNC: 4.8 MMOL/L (ref 3.5–5.1)
SODIUM SERPL-SCNC: 133 MMOL/L (ref 136–145)

## 2021-04-07 PROCEDURE — 36415 COLL VENOUS BLD VENIPUNCTURE: CPT

## 2021-04-07 PROCEDURE — 80048 BASIC METABOLIC PNL TOTAL CA: CPT

## 2021-04-07 PROCEDURE — 85014 HEMATOCRIT: CPT

## 2021-04-09 LAB
BACTERIA UR CULT: ABNORMAL
BACTERIA UR CULT: ABNORMAL

## 2021-04-09 NOTE — PERIOP NOTES
Dr. Ramandeep Griffith made aware pt has history of mitral valve prolapse, no cardiologist, no symptoms. States ok to proceed.

## 2021-04-10 ENCOUNTER — HOSPITAL ENCOUNTER (OUTPATIENT)
Dept: PREADMISSION TESTING | Age: 56
Discharge: HOME | End: 2021-04-10
Payer: MEDICARE

## 2021-04-10 LAB — SARS-COV-2, COV2: NORMAL

## 2021-04-10 PROCEDURE — U0005 INFEC AGEN DETEC AMPLI PROBE: HCPCS

## 2021-04-11 LAB — SARS-COV-2, COV2NT: NOT DETECTED

## 2021-04-14 ENCOUNTER — ANESTHESIA (OUTPATIENT)
Dept: SURGERY | Age: 56
DRG: 281 | End: 2021-04-14
Payer: MEDICARE

## 2021-04-14 ENCOUNTER — HOSPITAL ENCOUNTER (OUTPATIENT)
Age: 56
Discharge: HOME | DRG: 281 | End: 2021-04-14
Attending: UROLOGY | Admitting: UROLOGY
Payer: MEDICARE

## 2021-04-14 ENCOUNTER — ANESTHESIA EVENT (OUTPATIENT)
Dept: SURGERY | Age: 56
DRG: 281 | End: 2021-04-14
Payer: MEDICARE

## 2021-04-14 VITALS
DIASTOLIC BLOOD PRESSURE: 55 MMHG | HEART RATE: 94 BPM | BODY MASS INDEX: 19.81 KG/M2 | RESPIRATION RATE: 16 BRPM | OXYGEN SATURATION: 93 % | WEIGHT: 116 LBS | HEIGHT: 64 IN | SYSTOLIC BLOOD PRESSURE: 103 MMHG | TEMPERATURE: 98.4 F

## 2021-04-14 PROBLEM — N34.0: Status: ACTIVE | Noted: 2021-04-14

## 2021-04-14 PROCEDURE — 74011250636 HC RX REV CODE- 250/636: Performed by: UROLOGY

## 2021-04-14 PROCEDURE — 74011250636 HC RX REV CODE- 250/636: Performed by: NURSE ANESTHETIST, CERTIFIED REGISTERED

## 2021-04-14 PROCEDURE — 76210000027 HC REC RM PH II 3.5 TO 4 HR: Performed by: UROLOGY

## 2021-04-14 PROCEDURE — 76010000138 HC OR TIME 0.5 TO 1 HR: Performed by: UROLOGY

## 2021-04-14 PROCEDURE — 2709999900 HC NON-CHARGEABLE SUPPLY: Performed by: UROLOGY

## 2021-04-14 PROCEDURE — 76060000032 HC ANESTHESIA 0.5 TO 1 HR: Performed by: UROLOGY

## 2021-04-14 PROCEDURE — 76210000016 HC OR PH I REC 1 TO 1.5 HR: Performed by: UROLOGY

## 2021-04-14 PROCEDURE — 74011000636 HC RX REV CODE- 636

## 2021-04-14 PROCEDURE — 74011000250 HC RX REV CODE- 250: Performed by: UROLOGY

## 2021-04-14 PROCEDURE — 74011000250 HC RX REV CODE- 250

## 2021-04-14 PROCEDURE — C1758 CATHETER, URETERAL: HCPCS | Performed by: UROLOGY

## 2021-04-14 PROCEDURE — 0T7D8ZZ DILATION OF URETHRA, VIA NATURAL OR ARTIFICIAL OPENING ENDOSCOPIC: ICD-10-PCS | Performed by: UROLOGY

## 2021-04-14 PROCEDURE — 74011000250 HC RX REV CODE- 250: Performed by: NURSE ANESTHETIST, CERTIFIED REGISTERED

## 2021-04-14 PROCEDURE — 74011250637 HC RX REV CODE- 250/637: Performed by: UROLOGY

## 2021-04-14 PROCEDURE — BT140ZZ FLUOROSCOPY OF KIDNEYS, URETERS AND BLADDER USING HIGH OSMOLAR CONTRAST: ICD-10-PCS | Performed by: UROLOGY

## 2021-04-14 PROCEDURE — 87086 URINE CULTURE/COLONY COUNT: CPT

## 2021-04-14 RX ORDER — GRANULES FOR ORAL 3 G/1
3 POWDER ORAL ONCE
Status: COMPLETED | OUTPATIENT
Start: 2021-04-14 | End: 2021-04-14

## 2021-04-14 RX ORDER — ONDANSETRON 2 MG/ML
4 INJECTION INTRAMUSCULAR; INTRAVENOUS AS NEEDED
Status: CANCELLED | OUTPATIENT
Start: 2021-04-14

## 2021-04-14 RX ORDER — LABETALOL HCL 20 MG/4 ML
5 SYRINGE (ML) INTRAVENOUS
Status: CANCELLED | OUTPATIENT
Start: 2021-04-14

## 2021-04-14 RX ORDER — LIDOCAINE HYDROCHLORIDE 20 MG/ML
JELLY TOPICAL AS NEEDED
Status: DISCONTINUED | OUTPATIENT
Start: 2021-04-14 | End: 2021-04-14 | Stop reason: HOSPADM

## 2021-04-14 RX ORDER — MIDAZOLAM HYDROCHLORIDE 1 MG/ML
0.5 INJECTION, SOLUTION INTRAMUSCULAR; INTRAVENOUS
Status: CANCELLED | OUTPATIENT
Start: 2021-04-14

## 2021-04-14 RX ORDER — SODIUM CHLORIDE 0.9 % (FLUSH) 0.9 %
5-40 SYRINGE (ML) INJECTION EVERY 8 HOURS
Status: CANCELLED | OUTPATIENT
Start: 2021-04-14

## 2021-04-14 RX ORDER — SODIUM CHLORIDE 0.9 % (FLUSH) 0.9 %
5-40 SYRINGE (ML) INJECTION AS NEEDED
Status: CANCELLED | OUTPATIENT
Start: 2021-04-14

## 2021-04-14 RX ORDER — KETAMINE HYDROCHLORIDE 10 MG/ML
INJECTION, SOLUTION INTRAMUSCULAR; INTRAVENOUS AS NEEDED
Status: DISCONTINUED | OUTPATIENT
Start: 2021-04-14 | End: 2021-04-14 | Stop reason: HOSPADM

## 2021-04-14 RX ORDER — EPHEDRINE SULFATE/0.9% NACL/PF 50 MG/5 ML
5 SYRINGE (ML) INTRAVENOUS AS NEEDED
Status: CANCELLED | OUTPATIENT
Start: 2021-04-14

## 2021-04-14 RX ORDER — FENTANYL CITRATE 50 UG/ML
50 INJECTION, SOLUTION INTRAMUSCULAR; INTRAVENOUS AS NEEDED
Status: CANCELLED | OUTPATIENT
Start: 2021-04-14

## 2021-04-14 RX ORDER — METOPROLOL TARTRATE 5 MG/5ML
2.5 INJECTION INTRAVENOUS
Status: CANCELLED | OUTPATIENT
Start: 2021-04-14

## 2021-04-14 RX ORDER — LIDOCAINE HYDROCHLORIDE 10 MG/ML
0.1 INJECTION, SOLUTION EPIDURAL; INFILTRATION; INTRACAUDAL; PERINEURAL AS NEEDED
Status: CANCELLED | OUTPATIENT
Start: 2021-04-14

## 2021-04-14 RX ORDER — HYDROCODONE BITARTRATE AND ACETAMINOPHEN 5; 325 MG/1; MG/1
1 TABLET ORAL AS NEEDED
Status: CANCELLED | OUTPATIENT
Start: 2021-04-14

## 2021-04-14 RX ORDER — MIDAZOLAM HYDROCHLORIDE 1 MG/ML
INJECTION, SOLUTION INTRAMUSCULAR; INTRAVENOUS
Status: DISCONTINUED | OUTPATIENT
Start: 2021-04-14 | End: 2021-04-14 | Stop reason: HOSPADM

## 2021-04-14 RX ORDER — FENTANYL CITRATE 50 UG/ML
50 INJECTION, SOLUTION INTRAMUSCULAR; INTRAVENOUS
Status: CANCELLED | OUTPATIENT
Start: 2021-04-14

## 2021-04-14 RX ORDER — LIDOCAINE HYDROCHLORIDE 20 MG/ML
INJECTION, SOLUTION EPIDURAL; INFILTRATION; INTRACAUDAL; PERINEURAL AS NEEDED
Status: DISCONTINUED | OUTPATIENT
Start: 2021-04-14 | End: 2021-04-14 | Stop reason: HOSPADM

## 2021-04-14 RX ORDER — MIDAZOLAM HYDROCHLORIDE 1 MG/ML
1 INJECTION, SOLUTION INTRAMUSCULAR; INTRAVENOUS AS NEEDED
Status: CANCELLED | OUTPATIENT
Start: 2021-04-14

## 2021-04-14 RX ORDER — ETOMIDATE 2 MG/ML
INJECTION INTRAVENOUS AS NEEDED
Status: DISCONTINUED | OUTPATIENT
Start: 2021-04-14 | End: 2021-04-14 | Stop reason: HOSPADM

## 2021-04-14 RX ORDER — HYDRALAZINE HYDROCHLORIDE 20 MG/ML
10 INJECTION INTRAMUSCULAR; INTRAVENOUS
Status: CANCELLED | OUTPATIENT
Start: 2021-04-14

## 2021-04-14 RX ORDER — SODIUM CHLORIDE, SODIUM LACTATE, POTASSIUM CHLORIDE, CALCIUM CHLORIDE 600; 310; 30; 20 MG/100ML; MG/100ML; MG/100ML; MG/100ML
1000 INJECTION, SOLUTION INTRAVENOUS CONTINUOUS
Status: DISCONTINUED | OUTPATIENT
Start: 2021-04-14 | End: 2021-04-14 | Stop reason: HOSPADM

## 2021-04-14 RX ORDER — HYDROMORPHONE HYDROCHLORIDE 1 MG/ML
0.4 INJECTION, SOLUTION INTRAMUSCULAR; INTRAVENOUS; SUBCUTANEOUS
Status: CANCELLED | OUTPATIENT
Start: 2021-04-14

## 2021-04-14 RX ORDER — DIPHENHYDRAMINE HYDROCHLORIDE 50 MG/ML
12.5 INJECTION, SOLUTION INTRAMUSCULAR; INTRAVENOUS AS NEEDED
Status: CANCELLED | OUTPATIENT
Start: 2021-04-14 | End: 2021-04-14

## 2021-04-14 RX ADMIN — GRANULES FOR ORAL SOLUTION 3 G: 3 POWDER ORAL at 11:31

## 2021-04-14 RX ADMIN — KETAMINE HYDROCHLORIDE 50 MG: 10 INJECTION INTRAMUSCULAR; INTRAVENOUS at 09:17

## 2021-04-14 RX ADMIN — KETAMINE HYDROCHLORIDE 50 MG: 10 INJECTION INTRAMUSCULAR; INTRAVENOUS at 09:24

## 2021-04-14 RX ADMIN — MIDAZOLAM HYDROCHLORIDE 2 MG/HR: 2 INJECTION, SOLUTION INTRAMUSCULAR; INTRAVENOUS at 09:12

## 2021-04-14 RX ADMIN — SODIUM CHLORIDE, POTASSIUM CHLORIDE, SODIUM LACTATE AND CALCIUM CHLORIDE: 600; 310; 30; 20 INJECTION, SOLUTION INTRAVENOUS at 09:06

## 2021-04-14 RX ADMIN — LIDOCAINE HYDROCHLORIDE 100 MG: 20 INJECTION, SOLUTION EPIDURAL; INFILTRATION; INTRACAUDAL; PERINEURAL at 09:17

## 2021-04-14 RX ADMIN — CEFAZOLIN SODIUM 1 G: 1 INJECTION, POWDER, FOR SOLUTION INTRAMUSCULAR; INTRAVENOUS at 09:21

## 2021-04-14 RX ADMIN — ETOMIDATE 12 MG: 2 INJECTION INTRAVENOUS at 09:17

## 2021-04-14 NOTE — PROGRESS NOTES
Rapid K+ ordered, result 6.1 first time, then 6.2 for second run. Made Dr. Yaneth Jones aware. Previous K+ 4/7/21 was 4.8. Will use previous lab. Rapid K+ discontinued.

## 2021-04-14 NOTE — PROGRESS NOTES
Pt education and paperwork given to patient, iv cannula discontinued. VSS.  Pt discharged home via wheelchair

## 2021-04-14 NOTE — ANESTHESIA POSTPROCEDURE EVALUATION
Procedure(s):  CYSTOURETHROSCOPY/URETHRAL DILATION/BILATERAL  RETROGRADES PYELOGRAM.    general    Anesthesia Post Evaluation      Multimodal analgesia: multimodal analgesia used between 6 hours prior to anesthesia start to PACU discharge  Patient location during evaluation: PACU  Patient participation: complete - patient participated  Level of consciousness: awake  Pain score: 0  Pain management: adequate  Airway patency: patent  Anesthetic complications: no  Cardiovascular status: acceptable  Respiratory status: acceptable  Hydration status: acceptable  Post anesthesia nausea and vomiting:  controlled  Final Post Anesthesia Temperature Assessment:  Normothermia (36.0-37.5 degrees C)      INITIAL Post-op Vital signs:   Vitals Value Taken Time   /80 04/14/21 1003   Temp     Pulse 108 04/14/21 1003   Resp 18 04/14/21 1003   SpO2 92 % 04/14/21 1003

## 2021-04-14 NOTE — ANESTHESIA PREPROCEDURE EVALUATION
Relevant Problems   No relevant active problems       Anesthetic History   No history of anesthetic complications            Review of Systems / Medical History  Patient summary reviewed, nursing notes reviewed and pertinent labs reviewed    Pulmonary  Within defined limits                 Neuro/Psych             Comments: Multiple sclerosis Cardiovascular                  Exercise tolerance: >4 METS  Comments: Mitral valve prolapse   GI/Hepatic/Renal     GERD           Endo/Other        Arthritis     Other Findings   Comments: ehler-danlos syndrome       Past Medical History:   Diagnosis Date    Arthritis     patient states not aware of this dx    Eyad-Danlos syndrome     GERD (gastroesophageal reflux disease)     MS (multiple sclerosis) (HCC)     MVP (mitral valve prolapse)     patient states told years ago, doesnt see cardiology    UTI (urinary tract infection)        Past Surgical History:   Procedure Laterality Date    HX WISDOM TEETH EXTRACTION         Current Outpatient Medications   Medication Instructions    B.ani/L.aci/L.sal/L.plan/L. Mango (PROBIOTIC FORMULA PO) Oral    baclofen (LIORESAL) 10 mg, Oral, 3 TIMES DAILY, 3 in the morning; 3 at noon, 3 in the evening     divalproex DR (DEPAKOTE) 250 mg, Oral, 3 TIMES DAILY, 2 in the morning; 2 in the evening     gabapentin (NEURONTIN) 400 mg, Oral, 3 TIMES DAILY, 3 in the morning; 3 at noon, 3 in the evening     spironolactone (ALDACTONE) 50 mg tablet Oral, DAILY, One at noon and 2 in the evening     teriflunomide (AUBAGIO) 7 mg, Oral, DAILY    tolterodine ER (DETROL LA) 2 mg, Oral, DAILY       Current Facility-Administered Medications   Medication Dose Route Frequency    lactated Ringers infusion 1,000 mL  1,000 mL IntraVENous CONTINUOUS    ceFAZolin (ANCEF) 1 g in sterile water (preservative free) 10 mL IV syringe  1 g IntraVENous ONCE       Patient Vitals for the past 24 hrs:   Temp SpO2   04/14/21 0823 36.9 °C (98.5 °F) 98 %       Lab Results   Component Value Date/Time    HGB 15.4 04/07/2021 11:02 AM    HCT 44.8 04/07/2021 11:02 AM     Lab Results   Component Value Date/Time    Sodium 133 (L) 04/07/2021 11:02 AM    Potassium 4.8 04/07/2021 11:02 AM    Chloride 99 04/07/2021 11:02 AM    CO2 30 04/07/2021 11:02 AM    Anion gap 4 (L) 04/07/2021 11:02 AM    Glucose 121 (H) 04/07/2021 11:02 AM    BUN 14 04/07/2021 11:02 AM    Creatinine 0.74 04/07/2021 11:02 AM    BUN/Creatinine ratio 19 04/07/2021 11:02 AM    GFR est AA >60 04/07/2021 11:02 AM    GFR est non-AA >60 04/07/2021 11:02 AM    Calcium 9.8 04/07/2021 11:02 AM     No results found for: APTT, PTP, INR, INREXT  Lab Results   Component Value Date/Time    Glucose 121 (H) 04/07/2021 11:02 AM     Physical Exam    Airway  Mallampati: I  TM Distance: 4 - 6 cm  Neck ROM: normal range of motion   Mouth opening: Normal     Cardiovascular    Rhythm: regular  Rate: normal         Dental  No notable dental hx       Pulmonary  Breath sounds clear to auscultation               Abdominal  GI exam deferred       Other Findings            Anesthetic Plan    ASA: 2  Anesthesia type: general          Induction: Intravenous  Anesthetic plan and risks discussed with: Patient and Family

## 2021-04-14 NOTE — OP NOTES
[facility]  OPERATIVE REPORT    Name:  Chris Love  MR#:  [id number]  :  1965  ACCOUNT #:  [de-identified]  DATE OF SERVICE:  2021    HISTORY:  This is a 65-year-old female with recurrent urinary tract infections, undergoing cystourethroscopy to investigate possible bladder outlet obstruction, she has had recurring infections and suspected of having incomplete bladder emptying as a cause. She has had previous recurrences with bladder outlet obstruction treated with cystoscopy and urethral dilations which has helped in the past.  Her last procedure was approximately 10 years ago. PREOPERATIVE DIAGNOSES:  Bladder outlet obstruction, recurrent urinary tract infections, incomplete bladder emptying. POSTOPERATIVE DIAGNOSES:  Bladder outlet obstruction, recurrent urinary tract infections, incomplete bladder emptying plus urethral stenosis. PROCEDURE PERFORMED:  Cystourethroscopy. Bilateral retrograde pyelograms urethral dilation    SURGEON:  Kamaljit Schreiber MD    ASSISTANT:  none    ANESTHESIA:  general    COMPLICATIONS:  none    SPECIMENS REMOVED:  C&S urine    IMPLANTS:  none    ESTIMATED BLOOD LOSS:  minimal    PROCEDURE:  With the patient in the lithotomy position under satisfactory general anesthesia, sterile prep with chlorhexidine, drape in the usual fashion for an endoscopic procedure was carried out. Urethra was calibrated and found to be narrowed at 20-Mosotho. Urethral dilation to 38-Mosotho was performed with Sherial Rios dilators. A 21. 5-Mosotho cystoscope was entered into the bladder and examination of the bladder showed generalized erythema. There was a moderate residual urine present. A thorough examination with the 70-degree and 30-degree lenses, however, did not disclose any neoplastic lesions of the bladder surface. Ureteral orifices were normally located on the trigone effluxing clear urine.   After the visual examination was completed and a thorough check of the entire bladder surface, the upper tracts were studied with retrograde pyelography using the occlusive bulb technique. These demonstrated normal upper urinary tracts. After completion of the x-rays, the bladder was drained, scopes were withdrawn. Bimanual pelvic examination was performed showing normal uterus with no pelvic masses. After completion of the exam, 2% lidocaine gel was instilled in the urethra for postop analgesia. The patient received Ancef 1 g preoperatively for antibiotic coverage. The patient will receive Monurol (fosfomycin) for postop antibiotic surgical prophylaxis. Culture and sensitivity of the urine was taken from the bladder directly. The patient will be seen in followup in approximately 2 weeks. RADIOGRAPHIC READING:    INDICATIONS FOR RETROGRADE PYELOGRAPHY:  Recurrent urinary tract infections, incomplete bladder emptying. RADIOGRAPHIC READING:  Preliminary x-rays of the abdomen and pelvis showed normal bony structures of the pelvis and lumbar spine. There were no soft tissue abnormalities noted and no calcifications seen along the course of the urinary tract. Following injection of contrast material, the ureters were normal in course and caliber. The renal pelves, infundibula, and calyces were normal bilaterally with no filling defects, stones or renal masses indenting the collecting systems. IMPRESSION:  Normal retrograde pyelograms. Dian Tucker MD      [id number]  D:  04/14/2021 10:08  T:  04/14/2021 15:44  JOB #:  [id number]  CC:   Isaac Conn MD

## 2021-04-15 ENCOUNTER — APPOINTMENT (OUTPATIENT)
Dept: CT IMAGING | Age: 56
DRG: 281 | End: 2021-04-15
Attending: EMERGENCY MEDICINE
Payer: MEDICARE

## 2021-04-15 ENCOUNTER — APPOINTMENT (OUTPATIENT)
Dept: GENERAL RADIOLOGY | Age: 56
DRG: 281 | End: 2021-04-15
Attending: EMERGENCY MEDICINE
Payer: MEDICARE

## 2021-04-15 ENCOUNTER — HOSPITAL ENCOUNTER (INPATIENT)
Age: 56
LOS: 1 days | Discharge: HOME | DRG: 281 | End: 2021-04-16
Attending: EMERGENCY MEDICINE | Admitting: INTERNAL MEDICINE
Payer: MEDICARE

## 2021-04-15 DIAGNOSIS — E87.6 HYPOKALEMIA: ICD-10-CM

## 2021-04-15 DIAGNOSIS — I21.4 NSTEMI (NON-ST ELEVATED MYOCARDIAL INFARCTION) (HCC): Primary | ICD-10-CM

## 2021-04-15 LAB
ALBUMIN SERPL-MCNC: 3.7 G/DL (ref 3.5–5)
ALBUMIN/GLOB SERPL: 0.9 {RATIO} (ref 1.1–2.2)
ALP SERPL-CCNC: 124 U/L (ref 45–117)
ALT SERPL-CCNC: 37 U/L (ref 12–78)
ANION GAP SERPL CALC-SCNC: 7 MMOL/L (ref 5–15)
APPEARANCE UR: CLEAR
AST SERPL W P-5'-P-CCNC: 43 U/L (ref 15–37)
ATRIAL RATE: 109 BPM
ATRIAL RATE: 123 BPM
BACTERIA SPEC CULT: NORMAL
BACTERIA URNS QL MICRO: NEGATIVE /HPF
BASOPHILS # BLD: 0 K/UL (ref 0–0.1)
BASOPHILS NFR BLD: 0 % (ref 0–1)
BILIRUB SERPL-MCNC: 0.5 MG/DL (ref 0.2–1)
BILIRUB UR QL: NEGATIVE
BUN SERPL-MCNC: 10 MG/DL (ref 6–20)
BUN/CREAT SERPL: 13 (ref 12–20)
CA-I BLD-MCNC: 9.6 MG/DL (ref 8.5–10.1)
CALCULATED P AXIS, ECG09: -14 DEGREES
CALCULATED P AXIS, ECG09: 70 DEGREES
CALCULATED R AXIS, ECG10: -16 DEGREES
CALCULATED R AXIS, ECG10: 55 DEGREES
CALCULATED T AXIS, ECG11: -17 DEGREES
CALCULATED T AXIS, ECG11: 82 DEGREES
CHLORIDE SERPL-SCNC: 102 MMOL/L (ref 97–108)
CO2 SERPL-SCNC: 30 MMOL/L (ref 21–32)
COLOR UR: ABNORMAL
CREAT SERPL-MCNC: 0.78 MG/DL (ref 0.55–1.02)
D DIMER PPP FEU-MCNC: <0.27 UG/ML(FEU)
DIAGNOSIS, 93000: NORMAL
DIAGNOSIS, 93000: NORMAL
DIFFERENTIAL METHOD BLD: ABNORMAL
EOSINOPHIL # BLD: 0 K/UL (ref 0–0.4)
EOSINOPHIL NFR BLD: 0 % (ref 0–7)
ERYTHROCYTE [DISTWIDTH] IN BLOOD BY AUTOMATED COUNT: 14.7 % (ref 11.5–14.5)
GLOBULIN SER CALC-MCNC: 4 G/DL (ref 2–4)
GLUCOSE SERPL-MCNC: 126 MG/DL (ref 65–100)
GLUCOSE UR STRIP.AUTO-MCNC: NEGATIVE MG/DL
HCT VFR BLD AUTO: 46.9 % (ref 35–47)
HGB BLD-MCNC: 15.9 G/DL (ref 11.5–16)
HGB UR QL STRIP: ABNORMAL
IMM GRANULOCYTES # BLD AUTO: 0 K/UL (ref 0–0.04)
IMM GRANULOCYTES NFR BLD AUTO: 0 % (ref 0–0.5)
KETONES UR QL STRIP.AUTO: 20 MG/DL
LEUKOCYTE ESTERASE UR QL STRIP.AUTO: ABNORMAL
LIPASE SERPL-CCNC: 121 U/L (ref 73–393)
LYMPHOCYTES # BLD: 1.5 K/UL (ref 0.8–3.5)
LYMPHOCYTES NFR BLD: 19 % (ref 12–49)
MCH RBC QN AUTO: 31.3 PG (ref 26–34)
MCHC RBC AUTO-ENTMCNC: 33.9 G/DL (ref 30–36.5)
MCV RBC AUTO: 92.3 FL (ref 80–99)
MONOCYTES # BLD: 0.7 K/UL (ref 0–1)
MONOCYTES NFR BLD: 8 % (ref 5–13)
MUCOUS THREADS URNS QL MICRO: ABNORMAL /LPF
NEUTS SEG # BLD: 6.1 K/UL (ref 1.8–8)
NEUTS SEG NFR BLD: 73 % (ref 32–75)
NITRITE UR QL STRIP.AUTO: NEGATIVE
P-R INTERVAL, ECG05: 152 MS
P-R INTERVAL, ECG05: 160 MS
PH UR STRIP: 5 [PH] (ref 5–8)
PLATELET # BLD AUTO: 244 K/UL (ref 150–400)
PMV BLD AUTO: 11.5 FL (ref 8.9–12.9)
POTASSIUM SERPL-SCNC: 3.2 MMOL/L (ref 3.5–5.1)
PROT SERPL-MCNC: 7.7 G/DL (ref 6.4–8.2)
PROT UR STRIP-MCNC: NEGATIVE MG/DL
Q-T INTERVAL, ECG07: 310 MS
Q-T INTERVAL, ECG07: 330 MS
QRS DURATION, ECG06: 78 MS
QRS DURATION, ECG06: 82 MS
QTC CALCULATION (BEZET), ECG08: 443 MS
QTC CALCULATION (BEZET), ECG08: 444 MS
RBC # BLD AUTO: 5.08 M/UL (ref 3.8–5.2)
RBC #/AREA URNS HPF: ABNORMAL /HPF (ref 0–5)
SODIUM SERPL-SCNC: 139 MMOL/L (ref 136–145)
SP GR UR REFRACTOMETRY: 1.01 (ref 1–1.03)
SPECIAL REQUESTS,SREQ: NORMAL
TROPONIN I SERPL-MCNC: 2.19 NG/ML
TROPONIN I SERPL-MCNC: 2.56 NG/ML
UA: UC IF INDICATED,UAUC: ABNORMAL
UROBILINOGEN UR QL STRIP.AUTO: 0.1 EU/DL (ref 0.1–1)
VENTRICULAR RATE, ECG03: 109 BPM
VENTRICULAR RATE, ECG03: 123 BPM
WBC # BLD AUTO: 8.3 K/UL (ref 3.6–11)
WBC URNS QL MICRO: ABNORMAL /HPF (ref 0–4)

## 2021-04-15 PROCEDURE — 74011250636 HC RX REV CODE- 250/636: Performed by: EMERGENCY MEDICINE

## 2021-04-15 PROCEDURE — 71275 CT ANGIOGRAPHY CHEST: CPT

## 2021-04-15 PROCEDURE — 74011000636 HC RX REV CODE- 636: Performed by: INTERNAL MEDICINE

## 2021-04-15 PROCEDURE — 77030016707 HC CATH ANGI DX SUPT1 CARD -B: Performed by: INTERNAL MEDICINE

## 2021-04-15 PROCEDURE — 71045 X-RAY EXAM CHEST 1 VIEW: CPT

## 2021-04-15 PROCEDURE — B2111ZZ FLUOROSCOPY OF MULTIPLE CORONARY ARTERIES USING LOW OSMOLAR CONTRAST: ICD-10-PCS | Performed by: INTERNAL MEDICINE

## 2021-04-15 PROCEDURE — 84484 ASSAY OF TROPONIN QUANT: CPT

## 2021-04-15 PROCEDURE — 36415 COLL VENOUS BLD VENIPUNCTURE: CPT

## 2021-04-15 PROCEDURE — 65270000029 HC RM PRIVATE

## 2021-04-15 PROCEDURE — 93458 L HRT ARTERY/VENTRICLE ANGIO: CPT | Performed by: INTERNAL MEDICINE

## 2021-04-15 PROCEDURE — 85379 FIBRIN DEGRADATION QUANT: CPT

## 2021-04-15 PROCEDURE — 99285 EMERGENCY DEPT VISIT HI MDM: CPT

## 2021-04-15 PROCEDURE — C1769 GUIDE WIRE: HCPCS | Performed by: INTERNAL MEDICINE

## 2021-04-15 PROCEDURE — 76210000017 HC OR PH I REC 1.5 TO 2 HR: Performed by: INTERNAL MEDICINE

## 2021-04-15 PROCEDURE — 81001 URINALYSIS AUTO W/SCOPE: CPT

## 2021-04-15 PROCEDURE — 74011000250 HC RX REV CODE- 250: Performed by: INTERNAL MEDICINE

## 2021-04-15 PROCEDURE — 96374 THER/PROPH/DIAG INJ IV PUSH: CPT

## 2021-04-15 PROCEDURE — 83690 ASSAY OF LIPASE: CPT

## 2021-04-15 PROCEDURE — 85025 COMPLETE CBC W/AUTO DIFF WBC: CPT

## 2021-04-15 PROCEDURE — 93005 ELECTROCARDIOGRAM TRACING: CPT

## 2021-04-15 PROCEDURE — 94762 N-INVAS EAR/PLS OXIMTRY CONT: CPT

## 2021-04-15 PROCEDURE — 99153 MOD SED SAME PHYS/QHP EA: CPT | Performed by: INTERNAL MEDICINE

## 2021-04-15 PROCEDURE — 80053 COMPREHEN METABOLIC PANEL: CPT

## 2021-04-15 PROCEDURE — C1894 INTRO/SHEATH, NON-LASER: HCPCS | Performed by: INTERNAL MEDICINE

## 2021-04-15 PROCEDURE — 77030029065 HC DRSG HEMO QCLOT ZMED -B: Performed by: INTERNAL MEDICINE

## 2021-04-15 PROCEDURE — 4A023N7 MEASUREMENT OF CARDIAC SAMPLING AND PRESSURE, LEFT HEART, PERCUTANEOUS APPROACH: ICD-10-PCS | Performed by: INTERNAL MEDICINE

## 2021-04-15 PROCEDURE — B2151ZZ FLUOROSCOPY OF LEFT HEART USING LOW OSMOLAR CONTRAST: ICD-10-PCS | Performed by: INTERNAL MEDICINE

## 2021-04-15 PROCEDURE — 74011250637 HC RX REV CODE- 250/637: Performed by: INTERNAL MEDICINE

## 2021-04-15 PROCEDURE — 77030019698 HC SYR ANGI MDLON MRTM -A: Performed by: INTERNAL MEDICINE

## 2021-04-15 PROCEDURE — 87086 URINE CULTURE/COLONY COUNT: CPT

## 2021-04-15 PROCEDURE — 74011250637 HC RX REV CODE- 250/637: Performed by: EMERGENCY MEDICINE

## 2021-04-15 PROCEDURE — C1760 CLOSURE DEV, VASC: HCPCS | Performed by: INTERNAL MEDICINE

## 2021-04-15 PROCEDURE — 74011250636 HC RX REV CODE- 250/636: Performed by: INTERNAL MEDICINE

## 2021-04-15 PROCEDURE — 99152 MOD SED SAME PHYS/QHP 5/>YRS: CPT | Performed by: INTERNAL MEDICINE

## 2021-04-15 RX ORDER — ACETAMINOPHEN 325 MG/1
650 TABLET ORAL
Status: DISCONTINUED | OUTPATIENT
Start: 2021-04-15 | End: 2021-04-16 | Stop reason: HOSPADM

## 2021-04-15 RX ORDER — HEPARIN SODIUM 1000 [USP'U]/ML
60 INJECTION, SOLUTION INTRAVENOUS; SUBCUTANEOUS AS NEEDED
Status: DISCONTINUED | OUTPATIENT
Start: 2021-04-15 | End: 2021-04-15

## 2021-04-15 RX ORDER — SODIUM CHLORIDE 0.9 % (FLUSH) 0.9 %
5-40 SYRINGE (ML) INJECTION EVERY 8 HOURS
Status: DISCONTINUED | OUTPATIENT
Start: 2021-04-15 | End: 2021-04-16 | Stop reason: HOSPADM

## 2021-04-15 RX ORDER — METOPROLOL TARTRATE 5 MG/5ML
2.5 INJECTION INTRAVENOUS
Status: DISCONTINUED | OUTPATIENT
Start: 2021-04-15 | End: 2021-04-16 | Stop reason: HOSPADM

## 2021-04-15 RX ORDER — HEPARIN SODIUM 10000 [USP'U]/100ML
12-25 INJECTION, SOLUTION INTRAVENOUS
Status: DISCONTINUED | OUTPATIENT
Start: 2021-04-15 | End: 2021-04-15

## 2021-04-15 RX ORDER — LABETALOL HCL 20 MG/4 ML
5 SYRINGE (ML) INTRAVENOUS
Status: DISCONTINUED | OUTPATIENT
Start: 2021-04-15 | End: 2021-04-16 | Stop reason: HOSPADM

## 2021-04-15 RX ORDER — EPHEDRINE SULFATE/0.9% NACL/PF 50 MG/5 ML
5 SYRINGE (ML) INTRAVENOUS AS NEEDED
Status: DISCONTINUED | OUTPATIENT
Start: 2021-04-15 | End: 2021-04-16 | Stop reason: HOSPADM

## 2021-04-15 RX ORDER — POTASSIUM CHLORIDE 20 MEQ/1
40 TABLET, EXTENDED RELEASE ORAL
Status: COMPLETED | OUTPATIENT
Start: 2021-04-15 | End: 2021-04-15

## 2021-04-15 RX ORDER — LIDOCAINE HYDROCHLORIDE 20 MG/ML
15 SOLUTION OROPHARYNGEAL ONCE
Status: ACTIVE | OUTPATIENT
Start: 2021-04-15 | End: 2021-04-15

## 2021-04-15 RX ORDER — HEPARIN SODIUM 200 [USP'U]/100ML
INJECTION, SOLUTION INTRAVENOUS
Status: COMPLETED | OUTPATIENT
Start: 2021-04-15 | End: 2021-04-15

## 2021-04-15 RX ORDER — MAG HYDROX/ALUMINUM HYD/SIMETH 200-200-20
30 SUSPENSION, ORAL (FINAL DOSE FORM) ORAL ONCE
Status: ACTIVE | OUTPATIENT
Start: 2021-04-15 | End: 2021-04-15

## 2021-04-15 RX ORDER — ASPIRIN 325 MG
325 TABLET ORAL
Status: COMPLETED | OUTPATIENT
Start: 2021-04-15 | End: 2021-04-15

## 2021-04-15 RX ORDER — GUAIFENESIN 100 MG/5ML
81 LIQUID (ML) ORAL DAILY
Status: DISCONTINUED | OUTPATIENT
Start: 2021-04-16 | End: 2021-04-16 | Stop reason: HOSPADM

## 2021-04-15 RX ORDER — HEPARIN SODIUM 1000 [USP'U]/ML
30 INJECTION, SOLUTION INTRAVENOUS; SUBCUTANEOUS AS NEEDED
Status: DISCONTINUED | OUTPATIENT
Start: 2021-04-15 | End: 2021-04-15

## 2021-04-15 RX ORDER — MORPHINE SULFATE 4 MG/ML
2 INJECTION INTRAVENOUS
Status: DISCONTINUED | OUTPATIENT
Start: 2021-04-15 | End: 2021-04-16 | Stop reason: HOSPADM

## 2021-04-15 RX ORDER — HYDROMORPHONE HYDROCHLORIDE 1 MG/ML
0.4 INJECTION, SOLUTION INTRAMUSCULAR; INTRAVENOUS; SUBCUTANEOUS
Status: DISCONTINUED | OUTPATIENT
Start: 2021-04-15 | End: 2021-04-16 | Stop reason: HOSPADM

## 2021-04-15 RX ORDER — HYDRALAZINE HYDROCHLORIDE 20 MG/ML
10 INJECTION INTRAMUSCULAR; INTRAVENOUS
Status: DISCONTINUED | OUTPATIENT
Start: 2021-04-15 | End: 2021-04-16 | Stop reason: HOSPADM

## 2021-04-15 RX ORDER — CLOPIDOGREL BISULFATE 75 MG/1
75 TABLET ORAL DAILY
Status: DISCONTINUED | OUTPATIENT
Start: 2021-04-16 | End: 2021-04-15

## 2021-04-15 RX ORDER — MIDAZOLAM HYDROCHLORIDE 1 MG/ML
INJECTION INTRAMUSCULAR; INTRAVENOUS AS NEEDED
Status: DISCONTINUED | OUTPATIENT
Start: 2021-04-15 | End: 2021-04-15 | Stop reason: HOSPADM

## 2021-04-15 RX ORDER — SODIUM CHLORIDE 0.9 % (FLUSH) 0.9 %
5-40 SYRINGE (ML) INJECTION AS NEEDED
Status: DISCONTINUED | OUTPATIENT
Start: 2021-04-15 | End: 2021-04-16 | Stop reason: HOSPADM

## 2021-04-15 RX ORDER — MORPHINE SULFATE 2 MG/ML
2 INJECTION, SOLUTION INTRAMUSCULAR; INTRAVENOUS
Status: DISCONTINUED | OUTPATIENT
Start: 2021-04-15 | End: 2021-04-16 | Stop reason: HOSPADM

## 2021-04-15 RX ORDER — CLOPIDOGREL 300 MG/1
300 TABLET, FILM COATED ORAL ONCE
Status: DISCONTINUED | OUTPATIENT
Start: 2021-04-15 | End: 2021-04-15

## 2021-04-15 RX ORDER — HEPARIN SODIUM 5000 [USP'U]/ML
60 INJECTION, SOLUTION INTRAVENOUS; SUBCUTANEOUS ONCE
Status: COMPLETED | OUTPATIENT
Start: 2021-04-15 | End: 2021-04-15

## 2021-04-15 RX ORDER — METOPROLOL TARTRATE 5 MG/5ML
INJECTION INTRAVENOUS AS NEEDED
Status: DISCONTINUED | OUTPATIENT
Start: 2021-04-15 | End: 2021-04-15 | Stop reason: HOSPADM

## 2021-04-15 RX ORDER — ATORVASTATIN CALCIUM 40 MG/1
80 TABLET, FILM COATED ORAL
Status: DISCONTINUED | OUTPATIENT
Start: 2021-04-15 | End: 2021-04-16 | Stop reason: HOSPADM

## 2021-04-15 RX ORDER — METOPROLOL TARTRATE 25 MG/1
25 TABLET, FILM COATED ORAL EVERY 6 HOURS
Status: DISCONTINUED | OUTPATIENT
Start: 2021-04-15 | End: 2021-04-16 | Stop reason: HOSPADM

## 2021-04-15 RX ORDER — MIDAZOLAM HYDROCHLORIDE 1 MG/ML
0.5 INJECTION, SOLUTION INTRAMUSCULAR; INTRAVENOUS
Status: DISCONTINUED | OUTPATIENT
Start: 2021-04-15 | End: 2021-04-16 | Stop reason: HOSPADM

## 2021-04-15 RX ORDER — ONDANSETRON 2 MG/ML
4 INJECTION INTRAMUSCULAR; INTRAVENOUS AS NEEDED
Status: DISCONTINUED | OUTPATIENT
Start: 2021-04-15 | End: 2021-04-16 | Stop reason: HOSPADM

## 2021-04-15 RX ORDER — HYDROCODONE BITARTRATE AND ACETAMINOPHEN 5; 325 MG/1; MG/1
1 TABLET ORAL AS NEEDED
Status: DISCONTINUED | OUTPATIENT
Start: 2021-04-15 | End: 2021-04-16 | Stop reason: HOSPADM

## 2021-04-15 RX ORDER — DIPHENHYDRAMINE HYDROCHLORIDE 50 MG/ML
INJECTION, SOLUTION INTRAMUSCULAR; INTRAVENOUS AS NEEDED
Status: DISCONTINUED | OUTPATIENT
Start: 2021-04-15 | End: 2021-04-15 | Stop reason: HOSPADM

## 2021-04-15 RX ORDER — LIDOCAINE HYDROCHLORIDE 10 MG/ML
INJECTION INFILTRATION; PERINEURAL AS NEEDED
Status: DISCONTINUED | OUTPATIENT
Start: 2021-04-15 | End: 2021-04-15 | Stop reason: HOSPADM

## 2021-04-15 RX ORDER — FENTANYL CITRATE 50 UG/ML
50 INJECTION, SOLUTION INTRAMUSCULAR; INTRAVENOUS
Status: DISCONTINUED | OUTPATIENT
Start: 2021-04-15 | End: 2021-04-16 | Stop reason: HOSPADM

## 2021-04-15 RX ORDER — DIPHENHYDRAMINE HYDROCHLORIDE 50 MG/ML
12.5 INJECTION, SOLUTION INTRAMUSCULAR; INTRAVENOUS AS NEEDED
Status: ACTIVE | OUTPATIENT
Start: 2021-04-15 | End: 2021-04-15

## 2021-04-15 RX ADMIN — IOPAMIDOL 100 ML: 755 INJECTION, SOLUTION INTRAVENOUS at 14:03

## 2021-04-15 RX ADMIN — ATORVASTATIN CALCIUM 80 MG: 40 TABLET, FILM COATED ORAL at 22:30

## 2021-04-15 RX ADMIN — METOPROLOL TARTRATE 25 MG: 25 TABLET, FILM COATED ORAL at 18:11

## 2021-04-15 RX ADMIN — HEPARIN SODIUM AND DEXTROSE 12 UNITS/KG/HR: 10000; 5 INJECTION INTRAVENOUS at 14:10

## 2021-04-15 RX ADMIN — HEPARIN SODIUM 3100 UNITS: 5000 INJECTION INTRAVENOUS; SUBCUTANEOUS at 14:10

## 2021-04-15 RX ADMIN — POTASSIUM CHLORIDE 40 MEQ: 1500 TABLET, EXTENDED RELEASE ORAL at 14:16

## 2021-04-15 RX ADMIN — NITROGLYCERIN 1 INCH: 20 OINTMENT TOPICAL at 13:40

## 2021-04-15 RX ADMIN — ASPIRIN 325 MG ORAL TABLET 325 MG: 325 PILL ORAL at 13:40

## 2021-04-15 RX ADMIN — Medication 10 ML: at 22:35

## 2021-04-15 RX ADMIN — Medication 10 ML: at 22:36

## 2021-04-15 NOTE — CONSULTS
Consult    NAME: Martina Roberts   :  1965   MRN:  [id number]     Date/Time:  4/15/2021 3:06 PM    Patient PCP: Brianna Stauffer MD  ________________________________________________________________________    This is an inpatient cardiology consultation requested by Dr. Ernestina Mora for non-STEMI. Assessment:     1. Acute non-STEMI. 2. History of MS and Eyad-Danlos syndrome, patient has ruled out for aortic dissection by CTA. Plan:     1. I agree with aspirin, nitrates, beta-blocker and IV heparin for antianginal coverage. 2. 2D echocardiogram for LV function and valvular function. 3. Since patient continues to have chest pain in setting of non-STEMI, patient will require cardiac catheterization which is being scheduled on a urgent basis in cardiac catheterization laboratory. 4. Patient's case discussed with attending physician Dr. Tori Caban physician Dr. Ernestina Mora and interventional cardiologist Dr. Erich Warren. []        High complexity decision making was performed        Subjective:   CHIEF COMPLAINT:     HISTORY OF PRESENT ILLNESS:     This is a pleasant 69-year-old  female with a history of MS, Eyad-Danlos syndrome, mitral valve prolapse, arthritis, GERD and UTI who yesterday underwent cystourethroscopy. Patient postop yesterday felt nauseated and around 3 AM woke up with 3/10 mid substernal pressure discomfort. Patient's chest discomfort after a while became intolerable with 10/10 intensity and shortness of breath prompting her to come to emergency room. In the emergency room patient's EKG showed suggestion of ST elevation in lead III but repeat EKG subsequently showed no ST-T wave changes with peak troponin of 2.6. Patient due to her Eyad-Danlos syndrome underwent chest CT scan which was negative for any aortic pathology. Patient now on combination of aspirin, nitrates, beta-blocker and IV heparin reports improvement in her chest discomfort.   Patient chest pain now is 2-3/10 in intensity. Past Medical History:   Diagnosis Date    Arthritis     patient states not aware of this dx    Eyad-Danlos syndrome     GERD (gastroesophageal reflux disease)     MS (multiple sclerosis) (HCC)     MVP (mitral valve prolapse)     patient states told years ago, doesnt see cardiology    UTI (urinary tract infection)       Past Surgical History:   Procedure Laterality Date    HX UROLOGICAL  04/14/2021    CYSTOURETHROSCOPY    HX UROLOGICAL  04/14/2021    BILATERAL  RETROGRADE PYELOGRAMS    HX UROLOGICAL  04/14/2021    URETHRAL DILATION    HX WISDOM TEETH EXTRACTION       Allergies   Allergen Reactions    Ciprofloxacin Rash    Fentanyl Rash    Nitrofurantoin Rash    Nortriptyline Vertigo    Prozac [Fluoxetine] Rash    Sulfa (Sulfonamide Antibiotics) Rash    Wellbutrin [Bupropion] Rash    Zofran [Ondansetron Hcl] Other (comments)     constipation    Diprivan [Propofol] Hives and Shortness of Breath      Meds:  See below  Social History     Tobacco Use    Smoking status: Never Smoker    Smokeless tobacco: Never Used   Substance Use Topics    Alcohol use: Never     Frequency: Never      Family History   Problem Relation Age of Onset    Hypertension Mother     Elevated Lipids Mother     Cancer Father         PROSTATE    Elevated Lipids Father        REVIEW OF SYSTEMS:     []         Unable to obtain  ROS due to ---   [x]         Total of 12 systems reviewed as follows:    Constitutional: negative fever, negative chills, negative weight loss  Eyes:   negative visual changes  ENT:   negative sore throat, tongue or lip swelling  Respiratory:  negative cough, negative wheezing  Cards:   As per history of present illness  GI:   negative for nausea, vomiting, diarrhea, and abdominal pain  Genitourinary: negative for frequency, dysuria  Integument:  negative for rash   Hematologic:  negative for easy bruising and gum/nose bleeding  Musculoskel: negative for myalgias, back pain  Neurological:  negative for headaches, dizziness, vertigo, weakness  Behavl/Psych: negative for feelings of anxiety, depression     Pertinent Positives include :    Objective:      Physical Exam:    Last 24hrs VS reviewed since prior progress note. Most recent are:    Visit Vitals  /83   Pulse (!) 105   Temp 98.7 °F (37.1 °C)   Resp 18   Ht 5' 4\" (1.626 m)   Wt 51.3 kg (113 lb)   LMP  (LMP Unknown)   SpO2 98%   BMI 19.40 kg/m²     No intake or output data in the 24 hours ending 04/15/21 1506     General Appearance: Well developed, well nourished, alert & oriented x 3,    no acute distress. Ears/Nose/Mouth/Throat: Pupils equal and round, Hearing grossly normal.  Neck: Supple. JVP within normal limits. Carotids good upstrokes, with no bruit. Chest: Lungs clear to auscultation bilaterally. Cardiovascular: Regular rate and rhythm, S1S2 normal, no murmur, rubs, gallops. Abdomen: Soft, non-tender, bowel sounds are active. No organomegaly. Extremities: No edema bilaterally. Femoral pulses +2, Distal Pulses +1. Skin: Warm and dry. Neuro: CN II-XII grossly intact, Strength and sensation grossly intact. []         Post-cath site without hematoma, bruit, tenderness, or thrill. Distal pulses intact. Data:      Telemetry:    EKG:  []  No new EKG for review. CTA CHEST W OR W WO CONT   Final Result   1. No pulmonary embolus. 2. Enlarged main pulmonary artery may be from pulmonary arterial hypertension or   possibly from Eyad-Danlos syndrome. 3. Thoracic aorta without aneurysm or dissection. XR CHEST PORT   Final Result             Prior to Admission medications    Medication Sig Start Date End Date Taking? Authorizing Provider   B.ani/L.aci/L.rafael/L.plan/L. Mango (PROBIOTIC FORMULA PO) Take  by mouth. Provider, Cassie   teriflunomide (Aubagio) 7 mg tablet Take 7 mg by mouth daily. Other, MD Jhonny   baclofen (LIORESAL) 10 mg tablet Take 10 mg by mouth three (3) times daily.  3 in the morning; 3 at noon, 3 in the evening    Other, MD Jhonny   spironolactone (ALDACTONE) 50 mg tablet Take  by mouth daily. One at noon and 2 in the evening    Other, MD Jhonny   divalproex DR (Depakote) 250 mg tablet Take 250 mg by mouth three (3) times daily. 2 in the morning; 2 in the evening    Other, MD Jhonny   gabapentin (NEURONTIN) 400 mg capsule Take 400 mg by mouth three (3) times daily. 3 in the morning; 3 at noon, 3 in the evening    Other, MD Jhonny       Recent Results (from the past 24 hour(s))   EKG, 12 LEAD, INITIAL    Collection Time: 04/15/21 11:24 AM   Result Value Ref Range    Ventricular Rate 123 BPM    Atrial Rate 123 BPM    P-R Interval 152 ms    QRS Duration 82 ms    Q-T Interval 310 ms    QTC Calculation (Bezet) 443 ms    Calculated P Axis -14 degrees    Calculated R Axis -16 degrees    Calculated T Axis -17 degrees    Diagnosis       Sinus tachycardia  Inferior infarct , age undetermined  Abnormal ECG  When compared with ECG of 04-FEB-2003 10:58,  Vent. rate has increased BY  52 BPM  Inferior infarct is now Present  Inverted T waves have replaced nonspecific T wave abnormality in Inferior   leads  Confirmed by Wendy Leyva ([id number]) on 4/15/2021 12:40:24 PM     CBC WITH AUTOMATED DIFF    Collection Time: 04/15/21 11:43 AM   Result Value Ref Range    WBC 8.3 3.6 - 11.0 K/uL    RBC 5.08 3.80 - 5.20 M/uL    HGB 15.9 11.5 - 16.0 g/dL    HCT 46.9 35.0 - 47.0 %    MCV 92.3 80.0 - 99.0 FL    MCH 31.3 26.0 - 34.0 PG    MCHC 33.9 30.0 - 36.5 g/dL    RDW 14.7 (H) 11.5 - 14.5 %    PLATELET 813 251 - 944 K/uL    MPV 11.5 8.9 - 12.9 FL    NEUTROPHILS 73 32 - 75 %    LYMPHOCYTES 19 12 - 49 %    MONOCYTES 8 5 - 13 %    EOSINOPHILS 0 0 - 7 %    BASOPHILS 0 0 - 1 %    IMMATURE GRANULOCYTES 0 0.0 - 0.5 %    ABS. NEUTROPHILS 6.1 1.8 - 8.0 K/UL    ABS. LYMPHOCYTES 1.5 0.8 - 3.5 K/UL    ABS. MONOCYTES 0.7 0.0 - 1.0 K/UL    ABS. EOSINOPHILS 0.0 0.0 - 0.4 K/UL    ABS. BASOPHILS 0.0 0.0 - 0.1 K/UL    ABS. IMM. GRANS. 0.0 0.00 - 0.04 K/UL    DF AUTOMATED     METABOLIC PANEL, COMPREHENSIVE    Collection Time: 04/15/21 11:43 AM   Result Value Ref Range    Sodium 139 136 - 145 mmol/L    Potassium 3.2 (L) 3.5 - 5.1 mmol/L    Chloride 102 97 - 108 mmol/L    CO2 30 21 - 32 mmol/L    Anion gap 7 5 - 15 mmol/L    Glucose 126 (H) 65 - 100 mg/dL    BUN 10 6 - 20 mg/dL    Creatinine 0.78 0.55 - 1.02 mg/dL    BUN/Creatinine ratio 13 12 - 20      GFR est AA >60 >60 ml/min/1.73m2    GFR est non-AA >60 >60 ml/min/1.73m2    Calcium 9.6 8.5 - 10.1 mg/dL    Bilirubin, total 0.5 0.2 - 1.0 mg/dL    AST (SGOT) 43 (H) 15 - 37 U/L    ALT (SGPT) 37 12 - 78 U/L    Alk.  phosphatase 124 (H) 45 - 117 U/L    Protein, total 7.7 6.4 - 8.2 g/dL    Albumin 3.7 3.5 - 5.0 g/dL    Globulin 4.0 2.0 - 4.0 g/dL    A-G Ratio 0.9 (L) 1.1 - 2.2     TROPONIN I    Collection Time: 04/15/21 11:43 AM   Result Value Ref Range    Troponin-I, Qt. 2.19 (H) <0.05 ng/mL   URINALYSIS W/ REFLEX CULTURE    Collection Time: 04/15/21 11:43 AM    Specimen: Urine   Result Value Ref Range    Color Yellow/Straw      Appearance Clear Clear      Specific gravity 1.014 1.003 - 1.030      pH (UA) 5.0 5.0 - 8.0      Protein Negative Negative mg/dL    Glucose Negative Negative mg/dL    Ketone 20 (A) Negative mg/dL    Bilirubin Negative Negative      Blood Moderate (A) Negative      Urobilinogen 0.1 0.1 - 1.0 EU/dL    Nitrites Negative Negative      Leukocyte Esterase Small (A) Negative      UA:UC IF INDICATED Urine Culture Ordered (A) Culture not indicated by UA result      WBC 10-20 0 - 4 /hpf    RBC 20-50 0 - 5 /hpf    Bacteria Negative Negative /hpf    Mucus Trace /lpf   LIPASE    Collection Time: 04/15/21 11:43 AM   Result Value Ref Range    Lipase 121 73 - 393 U/L   D DIMER    Collection Time: 04/15/21 11:45 AM   Result Value Ref Range    D DIMER <0.27 <0.50 ug/ml(FEU)   EKG, 12 LEAD, INITIAL    Collection Time: 04/15/21 12:52 PM   Result Value Ref Range    Ventricular Rate 109 BPM Atrial Rate 109 BPM    P-R Interval 160 ms    QRS Duration 78 ms    Q-T Interval 330 ms    QTC Calculation (Bezet) 444 ms    Calculated P Axis 70 degrees    Calculated R Axis 55 degrees    Calculated T Axis 82 degrees    Diagnosis       Sinus tachycardia  Possible Left atrial enlargement  Borderline ECG  When compared with ECG of 15-APR-2021 11:24,  Criteria for Inferior infarct are no longer Present  T wave inversion no longer evident in Inferior leads  T wave amplitude has decreased in Lateral leads  Confirmed by Wendy Leyva ([id number]) on 4/15/2021 1:36:23 PM     TROPONIN I    Collection Time: 04/15/21  1:30 PM   Result Value Ref Range    Troponin-I, Qt. 2.56 (H) <0.05 ng/mL        Echo Results  (Last 48 hours)    None        Critical care time spent 50 minutes    Patient's  EKG and laboratory data were individually reviewed by me. Please send a copy of this dictation to above referring physician. Michelle Hernandez MD    This dictation was done by Dragon computer voice recognition software. Occasionally grammatical, syntax and other interpretive errors escape final proof reading. Please feel free to call me for any clarification.

## 2021-04-15 NOTE — Clinical Note
Single view of the left ventricle obtained using power injection. Total volume = 30 mL. Rate = 15 mL/sec. Pressure = 500 PSI.

## 2021-04-15 NOTE — PROGRESS NOTES
History & Physical    Primary Care Provider: Sharri Adam MD  Source of Information: Patient/family     History of Presenting Illness:   Leonila Roberson is a 61 y.o. 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 The patient underwent cystoscopy yesterday for urethral stretching, performed under general anesthesia. She woke up this morning with chest pain that radiates to the back. The patient has never experienced anything like this before. Patient denies fever, chills, SOB, nausea, vomiting, dizziness, headache, diaphoresis, numbness, weakness, or swelling/edema. The patient does report that she is still actively feeling the substernal chest pressure. Patient's EKG is notable for sinus tachycardia and inferior infarct (age undetermined). Her troponin is positive (2.19). Glucose is elevated at 126 and potassium is slightly low at 3.2. UA is notable for ketones (20) and some leukocyte esterase. Urine cultures have been ordered. X-ray is unremarkable. Cardiology has been consulted. Patient will undergo CTA to r/o aortic aneurysm.           Review of Systems:  A comprehensive review of systems was negative except for that written in the History of Present Illness.      Past Medical History:   Diagnosis Date    Arthritis     patient states not aware of this dx    Eyad-Danlos syndrome     GERD (gastroesophageal reflux disease)     MS (multiple sclerosis) (HCC)     MVP (mitral valve prolapse)     patient states told years ago, doesnt see cardiology    UTI (urinary tract infection)         Past Surgical History:   Procedure Laterality Date    HX UROLOGICAL  04/14/2021    CYSTOURETHROSCOPY    HX UROLOGICAL  04/14/2021    BILATERAL  RETROGRADE PYELOGRAMS    HX UROLOGICAL  04/14/2021    URETHRAL DILATION    HX WISDOM TEETH EXTRACTION Prior to Admission medications    Medication Sig Start Date End Date Taking? Authorizing Provider   BShahramani/L.aci/L.rafael/L.plan/L. Mango (PROBIOTIC FORMULA PO) Take  by mouth. Provider, Cassie   teriflunomide (Aubagio) 7 mg tablet Take 7 mg by mouth daily. Other, MD Jhonny   baclofen (LIORESAL) 10 mg tablet Take 10 mg by mouth three (3) times daily. 3 in the morning; 3 at noon, 3 in the evening    Other, MD Jhonny   spironolactone (ALDACTONE) 50 mg tablet Take  by mouth daily. One at noon and 2 in the evening    Other, MD Jhonny   divalproex DR (Depakote) 250 mg tablet Take 250 mg by mouth three (3) times daily. 2 in the morning; 2 in the evening    Other, MD Jhonny   gabapentin (NEURONTIN) 400 mg capsule Take 400 mg by mouth three (3) times daily.  3 in the morning; 3 at noon, 3 in the evening    Other, MD Jhonny       Allergies   Allergen Reactions    Ciprofloxacin Rash    Fentanyl Rash    Nitrofurantoin Rash    Nortriptyline Vertigo    Prozac [Fluoxetine] Rash    Sulfa (Sulfonamide Antibiotics) Rash    Wellbutrin [Bupropion] Rash    Zofran [Ondansetron Hcl] Other (comments)     constipation    Diprivan [Propofol] Hives and Shortness of Breath        Family History   Problem Relation Age of Onset    Hypertension Mother    [facility] Elevated Lipids Mother     Cancer Father         PROSTATE    Elevated Lipids Father         Social History     Socioeconomic History    Marital status:      Spouse name: Not on file    Number of children: Not on file    Years of education: Not on file    Highest education level: Not on file   Tobacco Use    Smoking status: Never Smoker    Smokeless tobacco: Never Used   Substance and Sexual Activity    Alcohol use: Never     Frequency: Never    Drug use: Never    Sexual activity: Yes     Partners: Male            CODE STATUS:  DNR    Full    Other      Objective:     Physical Exam:     Visit Vitals  /62 (BP 1 Location: Right upper arm, BP Patient Position: At rest)   Pulse (!) 131   Temp 98.7 °F (37.1 °C)   Resp 17   Ht 5' 4\" (1.626 m)   Wt 51.3 kg (113 lb)   LMP  (LMP Unknown)   SpO2 97%   BMI 19.40 kg/m²      O2 Device: None (Room air)    General:  Alert, cooperative, no distress, appears stated age. Head:  Normocephalic, without obvious abnormality, atraumatic. Eyes:  Conjunctivae/corneas clear. PERRL, EOMs intact. Nose: Nares normal. Septum midline. Mucosa normal. No drainage or sinus tenderness. Throat: Lips, mucosa, and tongue normal. Teeth and gums normal.   Neck: Supple, symmetrical, trachea midline, no adenopathy, thyroid: no enlargement/tenderness/nodules, no carotid bruit and no JVD. Back:   Symmetric, no curvature. ROM normal. No CVA tenderness. Lungs:   Clear to auscultation bilaterally. Chest wall:  No tenderness or deformity. Heart:  Regular rate and rhythm, S1, S2 normal, no murmur, click, rub or gallop. Abdomen:   Soft, non-tender. Bowel sounds normal. No masses,  No organomegaly. Extremities: Extremities normal, atraumatic, no cyanosis or edema. Pulses: 2+ and symmetric all extremities. Skin: Skin color, texture, turgor normal. No rashes or lesions   Neurologic: CNII-XII intact. No motor or sensory deficits. 24 Hour Results:    Recent Results (from the past 24 hour(s))   EKG, 12 LEAD, INITIAL    Collection Time: 04/15/21 11:24 AM   Result Value Ref Range    Ventricular Rate 123 BPM    Atrial Rate 123 BPM    P-R Interval 152 ms    QRS Duration 82 ms    Q-T Interval 310 ms    QTC Calculation (Bezet) 443 ms    Calculated P Axis -14 degrees    Calculated R Axis -16 degrees    Calculated T Axis -17 degrees    Diagnosis       Sinus tachycardia  Inferior infarct , age undetermined  Abnormal ECG  When compared with ECG of 04-FEB-2003 10:58,  Vent.  rate has increased BY  52 BPM  Inferior infarct is now Present  Inverted T waves have replaced nonspecific T wave abnormality in Inferior   leads  Confirmed by Delia Tarango ([id number]) on 4/15/2021 12:40:24 PM     CBC WITH AUTOMATED DIFF    Collection Time: 04/15/21 11:43 AM   Result Value Ref Range    WBC 8.3 3.6 - 11.0 K/uL    RBC 5.08 3.80 - 5.20 M/uL    HGB 15.9 11.5 - 16.0 g/dL    HCT 46.9 35.0 - 47.0 %    MCV 92.3 80.0 - 99.0 FL    MCH 31.3 26.0 - 34.0 PG    MCHC 33.9 30.0 - 36.5 g/dL    RDW 14.7 (H) 11.5 - 14.5 %    PLATELET 515 468 - 367 K/uL    MPV 11.5 8.9 - 12.9 FL    NEUTROPHILS 73 32 - 75 %    LYMPHOCYTES 19 12 - 49 %    MONOCYTES 8 5 - 13 %    EOSINOPHILS 0 0 - 7 %    BASOPHILS 0 0 - 1 %    IMMATURE GRANULOCYTES 0 0.0 - 0.5 %    ABS. NEUTROPHILS 6.1 1.8 - 8.0 K/UL    ABS. LYMPHOCYTES 1.5 0.8 - 3.5 K/UL    ABS. MONOCYTES 0.7 0.0 - 1.0 K/UL    ABS. EOSINOPHILS 0.0 0.0 - 0.4 K/UL    ABS. BASOPHILS 0.0 0.0 - 0.1 K/UL    ABS. IMM. GRANS. 0.0 0.00 - 0.04 K/UL    DF AUTOMATED     METABOLIC PANEL, COMPREHENSIVE    Collection Time: 04/15/21 11:43 AM   Result Value Ref Range    Sodium 139 136 - 145 mmol/L    Potassium 3.2 (L) 3.5 - 5.1 mmol/L    Chloride 102 97 - 108 mmol/L    CO2 30 21 - 32 mmol/L    Anion gap 7 5 - 15 mmol/L    Glucose 126 (H) 65 - 100 mg/dL    BUN 10 6 - 20 mg/dL    Creatinine 0.78 0.55 - 1.02 mg/dL    BUN/Creatinine ratio 13 12 - 20      GFR est AA >60 >60 ml/min/1.73m2    GFR est non-AA >60 >60 ml/min/1.73m2    Calcium 9.6 8.5 - 10.1 mg/dL    Bilirubin, total 0.5 0.2 - 1.0 mg/dL    AST (SGOT) 43 (H) 15 - 37 U/L    ALT (SGPT) 37 12 - 78 U/L    Alk.  phosphatase 124 (H) 45 - 117 U/L    Protein, total 7.7 6.4 - 8.2 g/dL    Albumin 3.7 3.5 - 5.0 g/dL    Globulin 4.0 2.0 - 4.0 g/dL    A-G Ratio 0.9 (L) 1.1 - 2.2     TROPONIN I    Collection Time: 04/15/21 11:43 AM   Result Value Ref Range    Troponin-I, Qt. 2.19 (H) <0.05 ng/mL   URINALYSIS W/ REFLEX CULTURE    Collection Time: 04/15/21 11:43 AM    Specimen: Urine   Result Value Ref Range    Color Yellow/Straw      Appearance Clear Clear      Specific gravity 1.014 1.003 - 1.030      pH (UA) 5.0 5.0 - 8.0      Protein Negative Negative mg/dL    Glucose Negative Negative mg/dL    Ketone 20 (A) Negative mg/dL    Bilirubin Negative Negative      Blood Moderate (A) Negative      Urobilinogen 0.1 0.1 - 1.0 EU/dL    Nitrites Negative Negative      Leukocyte Esterase Small (A) Negative      UA:UC IF INDICATED Urine Culture Ordered (A) Culture not indicated by UA result      WBC 10-20 0 - 4 /hpf    RBC 20-50 0 - 5 /hpf    Bacteria Negative Negative /hpf    Mucus Trace /lpf   LIPASE    Collection Time: 04/15/21 11:43 AM   Result Value Ref Range    Lipase 121 73 - 393 U/L   D DIMER    Collection Time: 04/15/21 11:45 AM   Result Value Ref Range    D DIMER <0.27 <0.50 ug/ml(FEU)         Imaging:   XR CHEST PORT   Final Result      CTA CHEST W OR W WO CONT    (Results Pending)           Assessment:     NSTEMI (non-ST elevated myocardial infarction), positive cardiac enzymes     Hypokalemia, likely due to above    Eyad-Danlos Syndrome    Mitral Valve Prolapse (MVP)     Multiple Sclerosis    Arthritis    Hx of UTI    Hx of recent cystourethrography (04/15/21) under general anesthesia     Hx of GERD     Plan:     Cardiology has been consulted. Patient to undergo CTA to r/o aortic aneurysm. Repeat troponin. Home medications were reviewed    Signed By: Devin Mendoza     April 15, 2021       *ATTENTION:  This note has been created by a medical student for educational purposes only. Please do not refer to the content of this note for clinical decision-making, billing, or other purposes. Please see attending physicians note to obtain clinical information on this patient. *

## 2021-04-15 NOTE — PROGRESS NOTES
Bedside and Verbal shift change report given to Meghan Roach RN (oncoming nurse) by Breanne Farley RN (offgoing nurse). Report included the following information SBAR, MAR, Recent Results and Cardiac Rhythm NSR.

## 2021-04-15 NOTE — ED PROVIDER NOTES
EMERGENCY DEPARTMENT HISTORY AND PHYSICAL EXAM      Date: 4/15/2021  Patient Name: Tahir Vora    History of Presenting Illness     Chief Complaint   Patient presents with    Chest Pain       History Provided By: Patient    HPI: Tahir Vora, 61 y.o. female with a past medical history significant vik lima presents to the ED with chief complaint of Chest Pain  . 13-year-old female underwent a cystoscopy yesterday for urethral stretching. Was under general anesthesia. Woke up this morning with chest pain that radiates to the back. No difficulty breathing. Actively having the substernal chest chest pressure. No nausea vomiting diaphoresis. Has no cardiologist and never had any heart problems that she knows of. History of Eyad-Danlos. No new numbness or weakness. There are no other complaints, changes, or physical findings at this time. PCP: Jerman Franklin MD    Current Facility-Administered Medications   Medication Dose Route Frequency Provider Last Rate Last Admin    alum-mag hydroxide-simeth (MYLANTA) oral suspension 30 mL  30 mL Oral ONCE Sondra Hutson MD        lidocaine (XYLOCAINE) 2 % viscous solution 15 mL  15 mL Mouth/Throat ONCE Sondra Hutson MD        heparin (porcine) injection 3,100 Units  60 Units/kg IntraVENous ONCE Sondra Hutson MD        heparin 25,000 units in D5W 250 ml infusion  12-25 Units/kg/hr IntraVENous TITRATE Sondra Hutson MD        aspirin tablet 325 mg  325 mg Oral NOW Sondra Hutson MD         Current Outpatient Medications   Medication Sig Dispense Refill    B.ani/L.aci/L.sal/L.plan/L. Mango (PROBIOTIC FORMULA PO) Take  by mouth.  teriflunomide (Aubagio) 7 mg tablet Take 7 mg by mouth daily.  baclofen (LIORESAL) 10 mg tablet Take 10 mg by mouth three (3) times daily. 3 in the morning; 3 at noon, 3 in the evening      spironolactone (ALDACTONE) 50 mg tablet Take  by mouth daily.  One at noon and 2 in the evening  divalproex DR (Depakote) 250 mg tablet Take 250 mg by mouth three (3) times daily. 2 in the morning; 2 in the evening      gabapentin (NEURONTIN) 400 mg capsule Take 400 mg by mouth three (3) times daily. 3 in the morning; 3 at noon, 3 in the evening         Past History     Past Medical History:  Past Medical History:   Diagnosis Date    Arthritis     patient states not aware of this dx    Eyad-Danlos syndrome     GERD (gastroesophageal reflux disease)     MS (multiple sclerosis) (HCC)     MVP (mitral valve prolapse)     patient states told years ago, doesnt see cardiology    UTI (urinary tract infection)        Past Surgical History:  Past Surgical History:   Procedure Laterality Date    HX UROLOGICAL  04/14/2021    CYSTOURETHROSCOPY    HX UROLOGICAL  04/14/2021    BILATERAL  RETROGRADE PYELOGRAMS    HX UROLOGICAL  04/14/2021    URETHRAL DILATION    HX WISDOM TEETH EXTRACTION         Family History:  Family History   Problem Relation Age of Onset    Hypertension Mother     Elevated Lipids Mother     Cancer Father         PROSTATE    Elevated Lipids Father        Social History:  Social History     Tobacco Use    Smoking status: Never Smoker    Smokeless tobacco: Never Used   Substance Use Topics    Alcohol use: Never     Frequency: Never    Drug use: Never       Allergies:   Allergies   Allergen Reactions    Ciprofloxacin Rash    Fentanyl Rash    Nitrofurantoin Rash    Nortriptyline Vertigo    Prozac [Fluoxetine] Rash    Sulfa (Sulfonamide Antibiotics) Rash    Wellbutrin [Bupropion] Rash    Zofran [Ondansetron Hcl] Other (comments)     constipation    Diprivan [Propofol] Hives and Shortness of Breath         Review of Systems   Review of Systems    Physical Exam   Physical Exam    Diagnostic Study Results     Labs -     Recent Results (from the past 12 hour(s))   EKG, 12 LEAD, INITIAL    Collection Time: 04/15/21 11:24 AM   Result Value Ref Range    Ventricular Rate 123 BPM Atrial Rate 123 BPM    P-R Interval 152 ms    QRS Duration 82 ms    Q-T Interval 310 ms    QTC Calculation (Bezet) 443 ms    Calculated P Axis -14 degrees    Calculated R Axis -16 degrees    Calculated T Axis -17 degrees    Diagnosis       Sinus tachycardia  Inferior infarct , age undetermined  Abnormal ECG  When compared with ECG of 04-FEB-2003 10:58,  Vent. rate has increased BY  52 BPM  Inferior infarct is now Present  Inverted T waves have replaced nonspecific T wave abnormality in Inferior   leads  Confirmed by Monique Gomez ([id number]) on 4/15/2021 12:40:24 PM     CBC WITH AUTOMATED DIFF    Collection Time: 04/15/21 11:43 AM   Result Value Ref Range    WBC 8.3 3.6 - 11.0 K/uL    RBC 5.08 3.80 - 5.20 M/uL    HGB 15.9 11.5 - 16.0 g/dL    HCT 46.9 35.0 - 47.0 %    MCV 92.3 80.0 - 99.0 FL    MCH 31.3 26.0 - 34.0 PG    MCHC 33.9 30.0 - 36.5 g/dL    RDW 14.7 (H) 11.5 - 14.5 %    PLATELET 164 003 - 055 K/uL    MPV 11.5 8.9 - 12.9 FL    NEUTROPHILS 73 32 - 75 %    LYMPHOCYTES 19 12 - 49 %    MONOCYTES 8 5 - 13 %    EOSINOPHILS 0 0 - 7 %    BASOPHILS 0 0 - 1 %    IMMATURE GRANULOCYTES 0 0.0 - 0.5 %    ABS. NEUTROPHILS 6.1 1.8 - 8.0 K/UL    ABS. LYMPHOCYTES 1.5 0.8 - 3.5 K/UL    ABS. MONOCYTES 0.7 0.0 - 1.0 K/UL    ABS. EOSINOPHILS 0.0 0.0 - 0.4 K/UL    ABS. BASOPHILS 0.0 0.0 - 0.1 K/UL    ABS. IMM.  GRANS. 0.0 0.00 - 0.04 K/UL    DF AUTOMATED     METABOLIC PANEL, COMPREHENSIVE    Collection Time: 04/15/21 11:43 AM   Result Value Ref Range    Sodium 139 136 - 145 mmol/L    Potassium 3.2 (L) 3.5 - 5.1 mmol/L    Chloride 102 97 - 108 mmol/L    CO2 30 21 - 32 mmol/L    Anion gap 7 5 - 15 mmol/L    Glucose 126 (H) 65 - 100 mg/dL    BUN 10 6 - 20 mg/dL    Creatinine 0.78 0.55 - 1.02 mg/dL    BUN/Creatinine ratio 13 12 - 20      GFR est AA >60 >60 ml/min/1.73m2    GFR est non-AA >60 >60 ml/min/1.73m2    Calcium 9.6 8.5 - 10.1 mg/dL    Bilirubin, total 0.5 0.2 - 1.0 mg/dL    AST (SGOT) 43 (H) 15 - 37 U/L    ALT (SGPT) 37 12 - 78 U/L Alk. phosphatase 124 (H) 45 - 117 U/L    Protein, total 7.7 6.4 - 8.2 g/dL    Albumin 3.7 3.5 - 5.0 g/dL    Globulin 4.0 2.0 - 4.0 g/dL    A-G Ratio 0.9 (L) 1.1 - 2.2     TROPONIN I    Collection Time: 04/15/21 11:43 AM   Result Value Ref Range    Troponin-I, Qt. 2.19 (H) <0.05 ng/mL   URINALYSIS W/ REFLEX CULTURE    Collection Time: 04/15/21 11:43 AM    Specimen: Urine   Result Value Ref Range    Color Yellow/Straw      Appearance Clear Clear      Specific gravity 1.014 1.003 - 1.030      pH (UA) 5.0 5.0 - 8.0      Protein Negative Negative mg/dL    Glucose Negative Negative mg/dL    Ketone 20 (A) Negative mg/dL    Bilirubin Negative Negative      Blood Moderate (A) Negative      Urobilinogen 0.1 0.1 - 1.0 EU/dL    Nitrites Negative Negative      Leukocyte Esterase Small (A) Negative      UA:UC IF INDICATED Urine Culture Ordered (A) Culture not indicated by UA result      WBC 10-20 0 - 4 /hpf    RBC 20-50 0 - 5 /hpf    Bacteria Negative Negative /hpf    Mucus Trace /lpf   LIPASE    Collection Time: 04/15/21 11:43 AM   Result Value Ref Range    Lipase 121 73 - 393 U/L   D DIMER    Collection Time: 04/15/21 11:45 AM   Result Value Ref Range    D DIMER <0.27 <0.50 ug/ml(FEU)       Radiologic Studies -   XR CHEST PORT   Final Result      CTA CHEST W OR W WO CONT    (Results Pending)     CT Results  (Last 48 hours)    None        CXR Results  (Last 48 hours)               04/15/21 1148  XR CHEST PORT Final result    Narrative:  Chest single view. A single view of the chest is obtained. Lung volumes are within normal limits. The peripheral lungs are clear. Cardiac and mediastinal structures are within   normal limits. There is no pneumothorax or pleural effusion. Medical Decision Making and ED Course   I am the first provider for this patient. I reviewed the vital signs, available nursing notes, past medical history, past surgical history, family history and social history.     Vital Signs-Reviewed the patient's vital signs. Patient Vitals for the past 12 hrs:   Temp Pulse Resp BP SpO2   04/15/21 1110 98.7 °F (37.1 °C) (!) 131 17 110/62 97 %       EKG interpretation:   EKG #1 at 1124. Sinus tachycardia rate of 123. ST elevation questionable lead III only. Otherwise no ST changes. Reason rule out dysrhythmia. Interpreted by ER physician. EKG at 1252. Sinus tachycardia rate of 109. No ST changes. No T wave inversions. Normal intervals. Reason rule out dysrhythmia. Interpreted by ER physician. Records Reviewed: Previous Hospital chart. EMS run report      ED Course:   Initial assessment performed. The patients presenting problems have been discussed, and they are in agreement with the care plan formulated and outlined with them. I have encouraged them to ask questions as they arise throughout their visit. Orders Placed This Encounter    CULTURE, URINE     Standing Status:   Standing     Number of Occurrences:   1    XR CHEST PORT     Standing Status:   Standing     Number of Occurrences:   1     Order Specific Question:   Reason for Exam     Answer:   cp    CTA CHEST W OR W WO CONT     Standing Status:   Standing     Number of Occurrences:   1     Order Specific Question:   Transport     Answer:   BED [2]     Order Specific Question:   Reason for Exam     Answer:   vik reyes     Order Specific Question:   Does patient have history of Renal Disease?      Answer:   No     Order Specific Question:   Decision Support Exception     Answer:   Emergency Medical Condition (MA) [1]    CBC WITH AUTOMATED DIFF     Standing Status:   Standing     Number of Occurrences:   1    METABOLIC PANEL, COMPREHENSIVE     Standing Status:   Standing     Number of Occurrences:   1    TROPONIN I     Standing Status:   Standing     Number of Occurrences:   1    URINALYSIS W/ REFLEX CULTURE     Standing Status:   Standing     Number of Occurrences:   1    D DIMER     Standing Status: Standing     Number of Occurrences:   1    LIPASE     Standing Status:   Standing     Number of Occurrences:   1    NOTIFY PROVIDER: SPECIFY Notify provider on pt's arrival to floor ONE TIME STAT     Standing Status:   Standing     Number of Occurrences:   1     Order Specific Question:   Please describe the test or procedure you would like to order. Answer:   Notify provider on pt's arrival to floor    EKG, 12 LEAD, INITIAL     Standing Status:   Standing     Number of Occurrences:   1     Order Specific Question:   Reason for Exam:     Answer:   cp    alum-mag hydroxide-simeth (MYLANTA) oral suspension 30 mL    lidocaine (XYLOCAINE) 2 % viscous solution 15 mL    heparin (porcine) injection 3,100 Units    heparin 25,000 units in D5W 250 ml infusion    aspirin tablet 325 mg    IP CONSULT TO HOSPITALIST     Standing Status:   Standing     Number of Occurrences:   1     Order Specific Question:   Reason for Consult: Answer:   TO ADMIT     Order Specific Question:   Did you call or speak to the consulting provider? Answer: Yes    IP CONSULT TO CARDIOLOGY     Standing Status:   Standing     Number of Occurrences:   1     Order Specific Question:   Reason for Consult: Answer:   nstemi     Order Specific Question:   Did you call or speak to the consulting provider? Answer: Yes              CONSULTANTS:  Carols  lindsey will evaluate the patient as an NSTEMI. sandro will admit    Provider Notes (Medical Decision Making):   39-year-old female with Eyad-Danlos syndrome no cardiac history with chest pain that radiates to the back. Neuro intact. Recently had a procedure yesterday as well. Initial EKG showed elevation only in 1-lead. Not a STEMI. Elevated troponin however. Repeat EKG however is normal.  I did discuss the case with cardiology. Heparin ordered. I also ordered a CTA despite having a normal D-dimer.       Procedures           CRITICAL CARE NOTE :  1:05 PM  Amount of Critical Care Time: 45 minutes    IMPENDING DETERIORATION -Airway, Respiratory and Cardiovascular  ASSOCIATED RISK FACTORS - Dysrhythmia  MANAGEMENT- Bedside Assessment and Supervision of Care  INTERPRETATION -  Xrays and ECG  INTERVENTIONS - hemodynamic mngmt  CASE REVIEW - Hospitalist/Intensivist  TREATMENT RESPONSE -Improved  PERFORMED BY - Self    NOTES   :  I have spent critical care time involved in lab review, consultations with specialist, family decision- making, bedside attention and documentation. This time excludes time spent in any separate billed procedures. During this entire length of time I was immediately available to the patient . Jian Connelly MD              Disposition       Emergency Department Disposition:  Admitted      Diagnosis     Clinical Impression:   1. NSTEMI (non-ST elevated myocardial infarction) ([facility] [address]    2. Hypokalemia        Attestations:    Jian Connelly MD    Please note that this dictation was completed with [employer], the computer voice recognition software. Quite often unanticipated grammatical, syntax, homophones, and other interpretive errors are inadvertently transcribed by the computer software. Please disregard these errors. Please excuse any errors that have escaped final proofreading. Thank you.

## 2021-04-15 NOTE — ED TRIAGE NOTES
Pt complains of chest pain through to her back since 0300am, went into her PCP who sent her here, also complains of SOB, has cystoscopy done yesterday at [facility]

## 2021-04-15 NOTE — H&P
History & Physical    Primary Care Provider: Delfin Hudson MD  Source of Information: Patient/family     History of Presenting Illness:   Yazan Knight is a 61 y.o. 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 The patient underwent cystoscopy yesterday for urethral stretching, performed under general anesthesia. She woke up this morning with chest pain that radiates to the back. The patient has never experienced anything like this before. Patient denies fever, chills, SOB, nausea, vomiting, dizziness, headache, diaphoresis, numbness, weakness, or swelling/edema. The patient does report that she is still actively feeling the substernal chest pressure. Patient's EKG is notable for sinus tachycardia and inferior infarct (age undetermined). Her troponin is positive (2.19). Glucose is elevated at 126 and potassium is slightly low at 3.2. UA is notable for ketones (20) and some leukocyte esterase. Urine cultures have been ordered. X-ray is unremarkable. Cardiology has been consulted. Patient will undergo CTA to r/o aortic aneurysm.           Review of Systems:  A comprehensive review of systems was negative except for that written in the History of Present Illness.      Past Medical History:   Diagnosis Date    Arthritis     patient states not aware of this dx    Eyad-Danlos syndrome     GERD (gastroesophageal reflux disease)     MS (multiple sclerosis) (HCC)     MVP (mitral valve prolapse)     patient states told years ago, doesnt see cardiology    UTI (urinary tract infection)         Past Surgical History:   Procedure Laterality Date    HX UROLOGICAL  04/14/2021    CYSTOURETHROSCOPY    HX UROLOGICAL  04/14/2021    BILATERAL  RETROGRADE PYELOGRAMS    HX UROLOGICAL  04/14/2021    URETHRAL DILATION    HX WISDOM TEETH EXTRACTION Prior to Admission medications    Medication Sig Start Date End Date Taking? Authorizing Provider   BShahramani/L.aci/L.rafael/L.plan/L. Mango (PROBIOTIC FORMULA PO) Take  by mouth. Provider, Cassie   teriflunomide (Aubagio) 7 mg tablet Take 7 mg by mouth daily. Other, MD Jhonny   baclofen (LIORESAL) 10 mg tablet Take 10 mg by mouth three (3) times daily. 3 in the morning; 3 at noon, 3 in the evening    Other, MD Jhonny   spironolactone (ALDACTONE) 50 mg tablet Take  by mouth daily. One at noon and 2 in the evening    Other, MD Jhonny   divalproex DR (Depakote) 250 mg tablet Take 250 mg by mouth three (3) times daily. 2 in the morning; 2 in the evening    Other, MD Jhonny   gabapentin (NEURONTIN) 400 mg capsule Take 400 mg by mouth three (3) times daily.  3 in the morning; 3 at noon, 3 in the evening    Other, MD Jhonny       Allergies   Allergen Reactions    Ciprofloxacin Rash    Fentanyl Rash    Nitrofurantoin Rash    Nortriptyline Vertigo    Prozac [Fluoxetine] Rash    Sulfa (Sulfonamide Antibiotics) Rash    Wellbutrin [Bupropion] Rash    Zofran [Ondansetron Hcl] Other (comments)     constipation    Diprivan [Propofol] Hives and Shortness of Breath        Family History   Problem Relation Age of Onset    Hypertension Mother    Sherl Loge Elevated Lipids Mother     Cancer Father         PROSTATE    Elevated Lipids Father         Social History     Socioeconomic History    Marital status:      Spouse name: Not on file    Number of children: Not on file    Years of education: Not on file    Highest education level: Not on file   Tobacco Use    Smoking status: Never Smoker    Smokeless tobacco: Never Used   Substance and Sexual Activity    Alcohol use: Never     Frequency: Never    Drug use: Never    Sexual activity: Yes     Partners: Male            CODE STATUS:  DNR    Full    Other      Objective:     Physical Exam:     Visit Vitals  /62 (BP 1 Location: Right upper arm, BP Patient Position: At rest)   Pulse (!) 131   Temp 98.7 °F (37.1 °C)   Resp 17   Ht 5' 4\" (1.626 m)   Wt 51.3 kg (113 lb)   LMP  (LMP Unknown)   SpO2 97%   BMI 19.40 kg/m²      O2 Device: None (Room air)    General:  Alert, cooperative, no distress, appears stated age. Head:  Normocephalic, without obvious abnormality, atraumatic. Eyes:  Conjunctivae/corneas clear. PERRL, EOMs intact. Nose: Nares normal. Septum midline. Mucosa normal. No drainage or sinus tenderness. Throat: Lips, mucosa, and tongue normal. Teeth and gums normal.   Neck: Supple, symmetrical, trachea midline, no adenopathy, thyroid: no enlargement/tenderness/nodules, no carotid bruit and no JVD. Back:   Symmetric, no curvature. ROM normal. No CVA tenderness. Lungs:   Clear to auscultation bilaterally. Chest wall:  No tenderness or deformity. Heart:  Regular rate and rhythm, S1, S2 normal, no murmur, click, rub or gallop. Abdomen:   Soft, non-tender. Bowel sounds normal. No masses,  No organomegaly. Extremities: Extremities normal, atraumatic, no cyanosis or edema. Pulses: 2+ and symmetric all extremities. Skin: Skin color, texture, turgor normal. No rashes or lesions   Neurologic: CNII-XII intact. No motor or sensory deficits. 24 Hour Results:    Recent Results (from the past 24 hour(s))   EKG, 12 LEAD, INITIAL    Collection Time: 04/15/21 11:24 AM   Result Value Ref Range    Ventricular Rate 123 BPM    Atrial Rate 123 BPM    P-R Interval 152 ms    QRS Duration 82 ms    Q-T Interval 310 ms    QTC Calculation (Bezet) 443 ms    Calculated P Axis -14 degrees    Calculated R Axis -16 degrees    Calculated T Axis -17 degrees    Diagnosis       Sinus tachycardia  Inferior infarct , age undetermined  Abnormal ECG  When compared with ECG of 04-FEB-2003 10:58,  Vent.  rate has increased BY  52 BPM  Inferior infarct is now Present  Inverted T waves have replaced nonspecific T wave abnormality in Inferior   leads  Confirmed by Wendy Leyva ([id number]) on 4/15/2021 12:40:24 PM     CBC WITH AUTOMATED DIFF    Collection Time: 04/15/21 11:43 AM   Result Value Ref Range    WBC 8.3 3.6 - 11.0 K/uL    RBC 5.08 3.80 - 5.20 M/uL    HGB 15.9 11.5 - 16.0 g/dL    HCT 46.9 35.0 - 47.0 %    MCV 92.3 80.0 - 99.0 FL    MCH 31.3 26.0 - 34.0 PG    MCHC 33.9 30.0 - 36.5 g/dL    RDW 14.7 (H) 11.5 - 14.5 %    PLATELET 187 210 - 368 K/uL    MPV 11.5 8.9 - 12.9 FL    NEUTROPHILS 73 32 - 75 %    LYMPHOCYTES 19 12 - 49 %    MONOCYTES 8 5 - 13 %    EOSINOPHILS 0 0 - 7 %    BASOPHILS 0 0 - 1 %    IMMATURE GRANULOCYTES 0 0.0 - 0.5 %    ABS. NEUTROPHILS 6.1 1.8 - 8.0 K/UL    ABS. LYMPHOCYTES 1.5 0.8 - 3.5 K/UL    ABS. MONOCYTES 0.7 0.0 - 1.0 K/UL    ABS. EOSINOPHILS 0.0 0.0 - 0.4 K/UL    ABS. BASOPHILS 0.0 0.0 - 0.1 K/UL    ABS. IMM. GRANS. 0.0 0.00 - 0.04 K/UL    DF AUTOMATED     METABOLIC PANEL, COMPREHENSIVE    Collection Time: 04/15/21 11:43 AM   Result Value Ref Range    Sodium 139 136 - 145 mmol/L    Potassium 3.2 (L) 3.5 - 5.1 mmol/L    Chloride 102 97 - 108 mmol/L    CO2 30 21 - 32 mmol/L    Anion gap 7 5 - 15 mmol/L    Glucose 126 (H) 65 - 100 mg/dL    BUN 10 6 - 20 mg/dL    Creatinine 0.78 0.55 - 1.02 mg/dL    BUN/Creatinine ratio 13 12 - 20      GFR est AA >60 >60 ml/min/1.73m2    GFR est non-AA >60 >60 ml/min/1.73m2    Calcium 9.6 8.5 - 10.1 mg/dL    Bilirubin, total 0.5 0.2 - 1.0 mg/dL    AST (SGOT) 43 (H) 15 - 37 U/L    ALT (SGPT) 37 12 - 78 U/L    Alk.  phosphatase 124 (H) 45 - 117 U/L    Protein, total 7.7 6.4 - 8.2 g/dL    Albumin 3.7 3.5 - 5.0 g/dL    Globulin 4.0 2.0 - 4.0 g/dL    A-G Ratio 0.9 (L) 1.1 - 2.2     TROPONIN I    Collection Time: 04/15/21 11:43 AM   Result Value Ref Range    Troponin-I, Qt. 2.19 (H) <0.05 ng/mL   URINALYSIS W/ REFLEX CULTURE    Collection Time: 04/15/21 11:43 AM    Specimen: Urine   Result Value Ref Range    Color Yellow/Straw      Appearance Clear Clear      Specific gravity 1.014 1.003 - 1.030      pH (UA) 5.0 5.0 - 8.0      Protein Negative Negative mg/dL    Glucose Negative Negative mg/dL    Ketone 20 (A) Negative mg/dL    Bilirubin Negative Negative      Blood Moderate (A) Negative      Urobilinogen 0.1 0.1 - 1.0 EU/dL    Nitrites Negative Negative      Leukocyte Esterase Small (A) Negative      UA:UC IF INDICATED Urine Culture Ordered (A) Culture not indicated by UA result      WBC 10-20 0 - 4 /hpf    RBC 20-50 0 - 5 /hpf    Bacteria Negative Negative /hpf    Mucus Trace /lpf   LIPASE    Collection Time: 04/15/21 11:43 AM   Result Value Ref Range    Lipase 121 73 - 393 U/L   D DIMER    Collection Time: 04/15/21 11:45 AM   Result Value Ref Range    D DIMER <0.27 <0.50 ug/ml(FEU)   EKG, 12 LEAD, INITIAL    Collection Time: 04/15/21 12:52 PM   Result Value Ref Range    Ventricular Rate 109 BPM    Atrial Rate 109 BPM    P-R Interval 160 ms    QRS Duration 78 ms    Q-T Interval 330 ms    QTC Calculation (Bezet) 444 ms    Calculated P Axis 70 degrees    Calculated R Axis 55 degrees    Calculated T Axis 82 degrees    Diagnosis       Sinus tachycardia  Possible Left atrial enlargement  Borderline ECG  When compared with ECG of 15-APR-2021 11:24,  Criteria for Inferior infarct are no longer Present  T wave inversion no longer evident in Inferior leads  T wave amplitude has decreased in Lateral leads  Confirmed by Drew Dick ([id number]) on 4/15/2021 1:36:23 PM           Imaging:   XR CHEST PORT   Final Result      CTA CHEST W OR W WO CONT    (Results Pending)           Assessment:     NSTEMI (non-ST elevated myocardial infarction), positive cardiac enzymes    -Rule out aortic dissection given nature of pain and history of Eyad-Danlos syndrome    Hypokalemia, likely due to above    Eyad-Danlos Syndrome    Mitral Valve Prolapse (MVP)     Multiple Sclerosis    Arthritis    Hx of UTI    Hx of recent cystourethrography (04/15/21) under general anesthesia     Hx of GERD     Plan:   Patient will be admitted to the ICU  Cardiology has been consulted.   Patient to undergo CTA to r/o aortic aneurysm/dissection. Repeat troponin.    Echocardiogram  Cardiac catheterization if CTA negative    Full CODE STATUS    Home medications were reviewed    Signed By: Jaylin Cisneros MD     April 15, 2021

## 2021-04-15 NOTE — PROGRESS NOTES
Medications given to mother in [facility] waiting room. Patient transferred at [phone] to room 467. Report given to PATIENTS [facility] RN at bedside. Right groin site clean dry intact and no hematoma. Strong pulses. 4 eye skin assessment complete and intact except cath site. Tele monitor 10 confirmed.

## 2021-04-16 VITALS
OXYGEN SATURATION: 98 % | RESPIRATION RATE: 20 BRPM | HEART RATE: 105 BPM | BODY MASS INDEX: 19.29 KG/M2 | DIASTOLIC BLOOD PRESSURE: 65 MMHG | WEIGHT: 113 LBS | TEMPERATURE: 98.3 F | SYSTOLIC BLOOD PRESSURE: 125 MMHG | HEIGHT: 64 IN

## 2021-04-16 PROCEDURE — 74011250637 HC RX REV CODE- 250/637: Performed by: INTERNAL MEDICINE

## 2021-04-16 RX ORDER — DIVALPROEX SODIUM 250 MG/1
250 TABLET, DELAYED RELEASE ORAL 3 TIMES DAILY
Status: DISCONTINUED | OUTPATIENT
Start: 2021-04-16 | End: 2021-04-16

## 2021-04-16 RX ORDER — SPIRONOLACTONE 25 MG/1
50 TABLET ORAL DAILY
Status: DISCONTINUED | OUTPATIENT
Start: 2021-04-16 | End: 2021-04-16

## 2021-04-16 RX ORDER — ATORVASTATIN CALCIUM 80 MG/1
80 TABLET, FILM COATED ORAL
Qty: 30 TAB | Refills: 0 | Status: SHIPPED | OUTPATIENT
Start: 2021-04-16 | End: 2021-04-29 | Stop reason: ALTCHOICE

## 2021-04-16 RX ORDER — GABAPENTIN 400 MG/1
800 CAPSULE ORAL 3 TIMES DAILY
Status: DISCONTINUED | OUTPATIENT
Start: 2021-04-16 | End: 2021-04-16 | Stop reason: HOSPADM

## 2021-04-16 RX ORDER — BACLOFEN 10 MG/1
10 TABLET ORAL 3 TIMES DAILY
Status: DISCONTINUED | OUTPATIENT
Start: 2021-04-16 | End: 2021-04-16 | Stop reason: HOSPADM

## 2021-04-16 RX ORDER — SPIRONOLACTONE 25 MG/1
50 TABLET ORAL DAILY
Status: DISCONTINUED | OUTPATIENT
Start: 2021-04-16 | End: 2021-04-16 | Stop reason: HOSPADM

## 2021-04-16 RX ORDER — LISINOPRIL 2.5 MG/1
2.5 TABLET ORAL DAILY
Qty: 30 TAB | Refills: 0 | Status: SHIPPED | OUTPATIENT
Start: 2021-04-16 | End: 2021-06-15 | Stop reason: ALTCHOICE

## 2021-04-16 RX ORDER — SPIRONOLACTONE 25 MG/1
100 TABLET ORAL DAILY
Status: DISCONTINUED | OUTPATIENT
Start: 2021-04-17 | End: 2021-04-16 | Stop reason: HOSPADM

## 2021-04-16 RX ORDER — METOPROLOL SUCCINATE 25 MG/1
12.5 TABLET, EXTENDED RELEASE ORAL DAILY
Qty: 30 TAB | Refills: 0 | Status: SHIPPED | OUTPATIENT
Start: 2021-04-16 | End: 2021-06-15 | Stop reason: ALTCHOICE

## 2021-04-16 RX ORDER — DIVALPROEX SODIUM 250 MG/1
500 TABLET, DELAYED RELEASE ORAL 2 TIMES DAILY
Status: DISCONTINUED | OUTPATIENT
Start: 2021-04-16 | End: 2021-04-16 | Stop reason: HOSPADM

## 2021-04-16 RX ORDER — GABAPENTIN 400 MG/1
400 CAPSULE ORAL 3 TIMES DAILY
Status: DISCONTINUED | OUTPATIENT
Start: 2021-04-16 | End: 2021-04-16

## 2021-04-16 RX ORDER — GUAIFENESIN 100 MG/5ML
81 LIQUID (ML) ORAL DAILY
Qty: 30 TAB | Refills: 0 | Status: SHIPPED | OUTPATIENT
Start: 2021-04-17

## 2021-04-16 RX ADMIN — Medication 10 ML: at 04:28

## 2021-04-16 RX ADMIN — ASPIRIN 81 MG 81 MG: 81 TABLET ORAL at 09:00

## 2021-04-16 RX ADMIN — METOPROLOL TARTRATE 25 MG: 25 TABLET, FILM COATED ORAL at 05:13

## 2021-04-16 NOTE — PROGRESS NOTES
Nutrition Education    · Verbally reviewed information with Patient  · Educated on Weight gain edu  · Written educational materials provided - High protein/calorie nutrition therapy  · Contact name and number provided. · Refer to Patient Education activity for more details. RD spoke to pt at bedside regarding weight gain. Pt reports has always struggled to maintain weight. Reports has lost 12 lbs in the past 6 months unintentionally. Appears malnourished and has visible fat and muscle loss. RD spoke to pt regarding increasing protein, calories and nutrient intake. Reviewed foods to increase. Encouraged SFM that are nutrient and calorically dense. RD gave pt contact info and encouraged to reach out with further questions or concerns.      Electronically signed by Vanna Garcia RD on 4/16/2021 at 2:03 PM    Contact Number: Ext [phone]

## 2021-04-16 NOTE — DISCHARGE SUMMARY
Physician Discharge Summary     Patient ID:    Brennan Greene  [id number]  34 y.o.  1965    Admit date: 4/15/2021    Discharge date : 4/16/2021    Chronic Diagnoses:    Problem List as of 4/16/2021 Date Reviewed: 4/6/2021          Codes Class Noted - Resolved    NSTEMI (non-ST elevated myocardial infarction) [facility]) ICD-10-CM: I21.4  ICD-9-CM: 410.70  4/15/2021 - Present        Abscess of bulbourethral gland ICD-10-CM: N34.0  ICD-9-CM: 597.0  4/14/2021 - Present          22    Final Diagnoses:   [facility] [I21.4]  Takotsubo cardiomyopathy due to physical and mental stress of surgery the day before  Multiple sclerosis  Eyad-Danlos syndrome  History of GERD  Mitral valve prolapse  Hypokalemia  Osteoarthritis    Reason for Hospitalization:  Nolon December a 61 y. o. 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 The patient underwent cystoscopy the day before for urethral stretching, performed under general anesthesia.  The night post-op patient started feeling nauseated and around 3 AM woke up with 3/10 mid substernal pressure discomfort.  Patient's chest discomfort after a while became intolerable with 10/10 intensity and shortness of breath prompting her to come to ED. The patient has never experienced anything like this before. Patient denied fever, chills, SOB, vomiting, dizziness, headache, diaphoresis, numbness, weakness, or swelling/edema.  The patient reported actively feeling the substernal chest pressure during her H and P in the ED.      In the ED, patient's EKG showed suggestion of ST elevation in lead III but repeat EKG subsequently showed no ST-T wave changes with peak troponin of 2.6.  Due to her history of Eyad-Danlos syndrome, patient underwent chest CT scan which was negative for any aortic pathology. The patient then underwent cardiac cath which showed essentially normal coronary anatomy without occlusive disease. Left ventriculogram showed aneurysmal mid basal anterior and mid basal inferior wall with ejection fraction of 40 to 45%, felt to be consistent with Takotsubo cardiomyopathy      Hospital Course:   Patient was admitted overnight. She was started on aspirin, beta-blocker and high intensity statin therapy. Troponin peaked at 2.56    Patient will be started on low-dose beta-blocker on discharge along with low-dose ACE inhibitor. Echocardiogram will be done in the office setting    She was felt stable for discharge home on 4/16 and will follow up with cardiology in 2 weeks            Discharge Medications:   Current Discharge Medication List      START taking these medications    Details   lisinopriL (PRINIVIL, ZESTRIL) 2.5 mg tablet Take 1 Tab by mouth daily. Qty: 30 Tab, Refills: 0      metoprolol succinate (Toprol XL) 25 mg XL tablet Take 0.5 Tabs by mouth daily. Qty: 30 Tab, Refills: 0      aspirin 81 mg chewable tablet Take 1 Tab by mouth daily. Qty: 30 Tab, Refills: 0      atorvastatin (LIPITOR) 80 mg tablet Take 1 Tab by mouth nightly. Qty: 30 Tab, Refills: 0         CONTINUE these medications which have NOT CHANGED    Details   B.ani/L.aci/L.sal/L.plan/L. Mango (PROBIOTIC FORMULA PO) Take  by mouth. teriflunomide (Aubagio) 7 mg tablet Take 7 mg by mouth daily. baclofen (LIORESAL) 10 mg tablet Take 10 mg by mouth three (3) times daily. 3 in the morning; 3 at noon, 3 in the evening      spironolactone (ALDACTONE) 50 mg tablet Take  by mouth daily. One at noon and 2 in the evening      divalproex DR (Depakote) 250 mg tablet Take 250 mg by mouth two (2) times a day. 2 in the morning; 2 in the evening       gabapentin (NEURONTIN) 400 mg capsule Take 400 mg by mouth three (3) times daily.  2 in the morning; 2 at noon, 2 in the evening               Follow up Care: 1. Delfin Hudson MD in 1-2 weeks. Please call to set up an appointment shortly after discharge. Diet:  Cardiac Diet    Disposition:  Home. Advanced Directive:   FULL    DNR      Discharge Exam:  General:  Alert, cooperative, no distress, appears stated age. Lungs:   Clear to auscultation bilaterally. Chest wall:  No tenderness or deformity. Heart:  Regular rate and rhythm, S1, S2 normal, no murmur, click, rub or gallop. Abdomen:   Soft, non-tender. Bowel sounds normal. No masses,  No organomegaly. Extremities: Extremities normal, atraumatic, no cyanosis or edema. Pulses: 2+ and symmetric all extremities. Skin: Skin color, texture, turgor normal. No rashes or lesions   Neurologic: CNII-XII intact. No gross sensory or motor deficits        CONSULTATIONS: Cardiology    Significant Diagnostic Studies:   4/15/2021: BUN 10 mg/dL (Ref range: 6 - 20 mg/dL); Calcium 9.6 mg/dL (Ref range: 8.5 - 10.1 mg/dL); CO2 30 mmol/L (Ref range: 21 - 32 mmol/L); Creatinine 0.78 mg/dL (Ref range: 0.55 - 1.02 mg/dL); Glucose 126 mg/dL* (Ref range: 65 - 100 mg/dL); HCT 46.9 % (Ref range: 35.0 - 47.0 %); HGB 15.9 g/dL (Ref range: 11.5 - 16.0 g/dL); Potassium 3.2 mmol/L* (Ref range: 3.5 - 5.1 mmol/L); Sodium 139 mmol/L (Ref range: 136 - 145 mmol/L)  Recent Labs     04/15/21  1143   WBC 8.3   HGB 15.9   HCT 46.9        Recent Labs     04/15/21  1143      K 3.2*      CO2 30   BUN 10   CREA 0.78   *   CA 9.6     Recent Labs     04/15/21  1143   ALT 37   *   TBILI 0.5   TP 7.7   ALB 3.7   GLOB 4.0   LPSE 121     No results for input(s): INR, PTP, APTT, INREXT in the last 72 hours. No results for input(s): FE, TIBC, PSAT, FERR in the last 72 hours. No results for input(s): PH, PCO2, PO2 in the last 72 hours. No results for input(s): CPK, CKMB in the last 72 hours.     No lab exists for component: TROPONINI  No results found for: [facility]    Discharge time spent 35 minutes    Signed:  Rafita Pastor MD  4/16/2021  9:36 AM

## 2021-04-16 NOTE — PROGRESS NOTES
Progress Note      4/16/2021 9:33 AM  NAME: Leonila Roberson   MRN:  [id number]   Admit Diagnosis: NSTEMI (non-ST elevated myocardial infarction) ([facility] [I21.4]          Assessment/Plan:      1. Status post non-STEMI which appears to be type 2 non-STEMI with unremarkable cardiac catheterization. Patient has residual  Musculoskeletal achy pain which appears to be due to inflammation related to MS. Consider NSAIDs. 2. Tako-Subo syndrome,  LV ejection fraction 40-45% with  Akinetic and aneurysmal anterior and inferior wall by left ventriculogram. With use combination of low-dose ACE-inhibitor and beta-blocker for myocardial recovery. 3. Status post cystoscopy  4. MS  5. Eyad-Danlos syndrome. Chest CTA negative for aortic pathology. 6.  Patient okay to be discharged home from cardiac standpoint for outpatient management. Case discussed with attending physician Dr. Aneta Canela         []       High complexity decision making was performed in this patient at high risk for decompensation with multiple organ involvement. Subjective:     Leonila Roberson denies chest pain, dyspnea. Discussed with RN events overnight. Review of Systems:    Symptom Y/N Comments  Symptom Y/N Comments   Fever/Chills N   Chest Pain N    Poor Appetite N   Edema N    Cough N   Abdominal Pain N    Sputum N   Joint Pain N    SOB/MICHAUD N   Pruritis/Rash N    Nausea/vomit N   Tolerating PT/OT Y    Diarrhea N   Tolerating Diet Y    Constipation N   Other       Could NOT obtain due to:      Objective:      Physical Exam:    Last 24hrs VS reviewed since prior progress note.  Most recent are:    Visit Vitals  /74 (BP 1 Location: Right upper arm, BP Patient Position: At rest)   Pulse 96   Temp 98.8 °F (37.1 °C)   Resp 18   Ht 5' 4\" (1.626 m)   Wt 51.3 kg (113 lb)   LMP  (LMP Unknown)   SpO2 97%   BMI 19.40 kg/m²     No intake or output data in the 24 hours ending 04/16/21 0933     General Appearance: Well developed, well nourished, alert & oriented x 3,    no acute distress. Ears/Nose/Mouth/Throat: Hearing grossly normal.  Neck: Supple. Chest: Lungs clear to auscultation bilaterally. Cardiovascular: Regular rate and rhythm, S1S2 normal, no murmur. Abdomen: Soft, non-tender, bowel sounds are active. Extremities: No edema bilaterally. Skin: Warm and dry. []         Post-cath site without hematoma, bruit, tenderness, or thrill. Distal pulses intact. PMH/SH reviewed - no change compared to H&P    Data Review    Telemetry: normal sinus rhythm     EKG:   []  No new EKG for review    CTA CHEST W OR W WO CONT   Final Result   1. No pulmonary embolus. 2. Enlarged main pulmonary artery may be from pulmonary arterial hypertension or   possibly from Eyad-Danlos syndrome. 3. Thoracic aorta without aneurysm or dissection. XR CHEST PORT   Final Result           Recent Results (from the past 24 hour(s))   EKG, 12 LEAD, INITIAL    Collection Time: 04/15/21 11:24 AM   Result Value Ref Range    Ventricular Rate 123 BPM    Atrial Rate 123 BPM    P-R Interval 152 ms    QRS Duration 82 ms    Q-T Interval 310 ms    QTC Calculation (Bezet) 443 ms    Calculated P Axis -14 degrees    Calculated R Axis -16 degrees    Calculated T Axis -17 degrees    Diagnosis       Sinus tachycardia  Inferior infarct , age undetermined  Abnormal ECG  When compared with ECG of 04-FEB-2003 10:58,  Vent.  rate has increased BY  52 BPM  Inferior infarct is now Present  Inverted T waves have replaced nonspecific T wave abnormality in Inferior   leads  Confirmed by Tollie Apley ([id number]) on 4/15/2021 12:40:24 PM     CBC WITH AUTOMATED DIFF    Collection Time: 04/15/21 11:43 AM   Result Value Ref Range    WBC 8.3 3.6 - 11.0 K/uL    RBC 5.08 3.80 - 5.20 M/uL    HGB 15.9 11.5 - 16.0 g/dL    HCT 46.9 35.0 - 47.0 %    MCV 92.3 80.0 - 99.0 FL    MCH 31.3 26.0 - 34.0 PG    MCHC 33.9 30.0 - 36.5 g/dL    RDW 14.7 (H) 11.5 - 14.5 %    PLATELET 805 590 - 190 K/uL    MPV 11.5 8.9 - 12.9 FL    NEUTROPHILS 73 32 - 75 %    LYMPHOCYTES 19 12 - 49 %    MONOCYTES 8 5 - 13 %    EOSINOPHILS 0 0 - 7 %    BASOPHILS 0 0 - 1 %    IMMATURE GRANULOCYTES 0 0.0 - 0.5 %    ABS. NEUTROPHILS 6.1 1.8 - 8.0 K/UL    ABS. LYMPHOCYTES 1.5 0.8 - 3.5 K/UL    ABS. MONOCYTES 0.7 0.0 - 1.0 K/UL    ABS. EOSINOPHILS 0.0 0.0 - 0.4 K/UL    ABS. BASOPHILS 0.0 0.0 - 0.1 K/UL    ABS. IMM. GRANS. 0.0 0.00 - 0.04 K/UL    DF AUTOMATED     METABOLIC PANEL, COMPREHENSIVE    Collection Time: 04/15/21 11:43 AM   Result Value Ref Range    Sodium 139 136 - 145 mmol/L    Potassium 3.2 (L) 3.5 - 5.1 mmol/L    Chloride 102 97 - 108 mmol/L    CO2 30 21 - 32 mmol/L    Anion gap 7 5 - 15 mmol/L    Glucose 126 (H) 65 - 100 mg/dL    BUN 10 6 - 20 mg/dL    Creatinine 0.78 0.55 - 1.02 mg/dL    BUN/Creatinine ratio 13 12 - 20      GFR est AA >60 >60 ml/min/1.73m2    GFR est non-AA >60 >60 ml/min/1.73m2    Calcium 9.6 8.5 - 10.1 mg/dL    Bilirubin, total 0.5 0.2 - 1.0 mg/dL    AST (SGOT) 43 (H) 15 - 37 U/L    ALT (SGPT) 37 12 - 78 U/L    Alk.  phosphatase 124 (H) 45 - 117 U/L    Protein, total 7.7 6.4 - 8.2 g/dL    Albumin 3.7 3.5 - 5.0 g/dL    Globulin 4.0 2.0 - 4.0 g/dL    A-G Ratio 0.9 (L) 1.1 - 2.2     TROPONIN I    Collection Time: 04/15/21 11:43 AM   Result Value Ref Range    Troponin-I, Qt. 2.19 (H) <0.05 ng/mL   URINALYSIS W/ REFLEX CULTURE    Collection Time: 04/15/21 11:43 AM    Specimen: Urine   Result Value Ref Range    Color Yellow/Straw      Appearance Clear Clear      Specific gravity 1.014 1.003 - 1.030      pH (UA) 5.0 5.0 - 8.0      Protein Negative Negative mg/dL    Glucose Negative Negative mg/dL    Ketone 20 (A) Negative mg/dL    Bilirubin Negative Negative      Blood Moderate (A) Negative      Urobilinogen 0.1 0.1 - 1.0 EU/dL    Nitrites Negative Negative      Leukocyte Esterase Small (A) Negative      UA:UC IF INDICATED Urine Culture Ordered (A) Culture not indicated by UA result      WBC 10-20 0 - 4 /hpf    RBC 20-50 0 - 5 /hpf    Bacteria Negative Negative /hpf    Mucus Trace /lpf   LIPASE    Collection Time: 04/15/21 11:43 AM   Result Value Ref Range    Lipase 121 73 - 393 U/L   D DIMER    Collection Time: 04/15/21 11:45 AM   Result Value Ref Range    D DIMER <0.27 <0.50 ug/ml(FEU)   EKG, 12 LEAD, INITIAL    Collection Time: 04/15/21 12:52 PM   Result Value Ref Range    Ventricular Rate 109 BPM    Atrial Rate 109 BPM    P-R Interval 160 ms    QRS Duration 78 ms    Q-T Interval 330 ms    QTC Calculation (Bezet) 444 ms    Calculated P Axis 70 degrees    Calculated R Axis 55 degrees    Calculated T Axis 82 degrees    Diagnosis       Sinus tachycardia  Possible Left atrial enlargement  Borderline ECG  When compared with ECG of 15-APR-2021 11:24,  Criteria for Inferior infarct are no longer Present  T wave inversion no longer evident in Inferior leads  T wave amplitude has decreased in Lateral leads  Confirmed by Nathalie Wolf ([id number]) on 4/15/2021 1:36:23 PM     TROPONIN I    Collection Time: 04/15/21  1:30 PM   Result Value Ref Range    Troponin-I, Qt. 2.56 (H) <0.05 ng/mL        Echo Results  (Last 48 hours)    None          Patient's  EKG, laboratory data and echocardiogram were individually reviewed by me. Lab Data:    Recent Labs     04/15/21  1143   WBC 8.3   HGB 15.9   HCT 46.9        No results for input(s): INR, PTP, APTT, INREXT in the last 72 hours. Recent Labs     04/15/21  1143      K 3.2*      CO2 30   BUN 10   CREA 0.78   *   CA 9.6     Recent Labs     04/15/21  1330 04/15/21  1143   TROIQ 2.56* 2.19*     No results found for: CHOL, CHOLX, CHLST, CHOLV, HDL, HDLP, LDL, LDLC, DLDLP, TGLX, TRIGL, TRIGP, CHHD, CHHDX    Recent Labs     04/15/21  1143   *   TP 7.7   ALB 3.7   GLOB 4.0   LPSE 121     No results for input(s): PH, PCO2, PO2 in the last 72 hours.     Medications Personally Reviewed:    Current Facility-Administered Medications   Medication Dose Route Frequency    baclofen (LIORESAL) tablet 10 mg  10 mg Oral TID    divalproex DR (DEPAKOTE) tablet 250 mg  250 mg Oral TID    gabapentin (NEURONTIN) capsule 400 mg  400 mg Oral TID    spironolactone (ALDACTONE) tablet 50 mg  50 mg Oral DAILY    teriflunomide (AUBAGIO) tablet 7 mg  7 mg Oral DAILY    sodium chloride (NS) flush 5-40 mL  5-40 mL IntraVENous Q8H    sodium chloride (NS) flush 5-40 mL  5-40 mL IntraVENous PRN    nitroglycerin (NITROBID) 2 % ointment 1 Inch  1 Inch Topical Q6H    metoprolol tartrate (LOPRESSOR) tablet 25 mg  25 mg Oral Q6H    aspirin chewable tablet 81 mg  81 mg Oral DAILY    atorvastatin (LIPITOR) tablet 80 mg  80 mg Oral QHS    acetaminophen (TYLENOL) tablet 650 mg  650 mg Oral Q4H PRN    morphine injection 2 mg  2 mg IntraVENous Q5MIN PRN    sodium chloride (NS) flush 5-40 mL  5-40 mL IntraVENous Q8H    sodium chloride (NS) flush 5-40 mL  5-40 mL IntraVENous PRN    morphine injection 2 mg  2 mg IntraVENous Q4H PRN    ePHEDrine in NS (PF) (MISTOLE) 10 mg/mL in NS syringe 5 mg  5 mg IntraVENous PRN    fentaNYL citrate (PF) injection 50 mcg  50 mcg IntraVENous Multiple    HYDROcodone-acetaminophen (NORCO) 5-325 mg per tablet 1 Tab  1 Tab Oral PRN    HYDROmorphone (DILAUDID) syringe 0.4 mg  0.4 mg IntraVENous Multiple    midazolam (VERSED) injection 0.5 mg  0.5 mg IntraVENous Q5MIN PRN    ondansetron (ZOFRAN) injection 4 mg  4 mg IntraVENous PRN    sodium chloride (NS) flush 5-40 mL  5-40 mL IntraVENous Q8H    sodium chloride (NS) flush 5-40 mL  5-40 mL IntraVENous PRN    hydrALAZINE (APRESOLINE) 20 mg/mL injection 10 mg  10 mg IntraVENous Q10MIN PRN    labetaloL (NORMODYNE;TRANDATE) 20 mg/4 mL (5 mg/mL) injection 5 mg  5 mg IntraVENous Q5MIN PRN    metoprolol (LOPRESSOR) injection 2.5 mg  2.5 mg IntraVENous Q5MIN PRN         Prior to Admission medications    Medication Sig Start Date End Date Taking? Authorizing Provider   B.ani/L.aci/L.rafael/L.plan/L. Mango (PROBIOTIC FORMULA PO) Take  by mouth.   Yes Provider, Historical   teriflunomide (Aubagio) 7 mg tablet Take 7 mg by mouth daily. Yes Jhonny Blanca MD   baclofen (LIORESAL) 10 mg tablet Take 10 mg by mouth three (3) times daily. 3 in the morning; 3 at noon, 3 in the evening   Yes Rianna, MD Jhonny   spironolactone (ALDACTONE) 50 mg tablet Take  by mouth daily. One at noon and 2 in the evening   Yes Jhonny Blanca MD   divalproex DR (Depakote) 250 mg tablet Take 250 mg by mouth two (2) times a day. 2 in the morning; 2 in the evening    Yes Jhonny Blanca MD   gabapentin (NEURONTIN) 400 mg capsule Take 400 mg by mouth three (3) times daily.  2 in the morning; 2 at noon, 2 in the evening   Yes Jhonny Blanca MD        Clinical care time spent 30 minutes    Angy Silva MD

## 2021-04-16 NOTE — PROGRESS NOTES
Hospitalist Progress Note               Daily Progress Note: 4/16/2021      Subjective: The patient is seen for follow up. Zahraa Sun is a 61 y.o. 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 The patient underwent cystoscopy the day before for urethral stretching, performed under general anesthesia.  The night post-op patient started feeling nauseated and around 3 AM woke up with 3/10 mid substernal pressure discomfort. Patient's chest discomfort after a while became intolerable with 10/10 intensity and shortness of breath prompting her to come to ED. The patient has never experienced anything like this before. Patient denied fever, chills, SOB, vomiting, dizziness, headache, diaphoresis, numbness, weakness, or swelling/edema. The patient reported actively feeling the substernal chest pressure during her H and P in the ED. In the ED, patient's EKG showed suggestion of ST elevation in lead III but repeat EKG subsequently showed no ST-T wave changes with peak troponin of 2.6. Due to her history of Eyad-Danlos syndrome, patient underwent chest CT scan which was negative for any aortic pathology. The patient then underwent cardiac cath which showed essentially normal coronary anatomy without occlusive disease. This combined with elevated troponin suggested Tako- Subu syndrome, likely from the mental and physical stress of the surgery the day before the symptoms. The cardiologist suggested medical management with ACE inhibitor and beta-blockers for patient's nonischemic cardiomyopathy. Patient was seen in her room today. She reports that she's still feeling some chest pain/pressure but it's much improved from when she came in yesterday. She appeared comfortable and in no acute distress.  The patient denied SOB, nausea, vomiting, dizziness, lightheadedness, or visual disturbances. Patient is ready to be discharged.  She should continue with recommended medications with outpatient cardiology f/u.        Problem List:  Problem List as of 4/16/2021 Date Reviewed: 4/6/2021          Codes Class Noted - Resolved    NSTEMI (non-ST elevated myocardial infarction) [facility]) ICD-10-CM: I21.4  ICD-9-CM: 410.70  4/15/2021 - Present        Abscess of bulbourethral gland ICD-10-CM: N34.0  ICD-9-CM: 597.0  4/14/2021 - Present              Medications reviewed  Current Facility-Administered Medications   Medication Dose Route Frequency    sodium chloride (NS) flush 5-40 mL  5-40 mL IntraVENous Q8H    sodium chloride (NS) flush 5-40 mL  5-40 mL IntraVENous PRN    nitroglycerin (NITROBID) 2 % ointment 1 Inch  1 Inch Topical Q6H    metoprolol tartrate (LOPRESSOR) tablet 25 mg  25 mg Oral Q6H    aspirin chewable tablet 81 mg  81 mg Oral DAILY    atorvastatin (LIPITOR) tablet 80 mg  80 mg Oral QHS    acetaminophen (TYLENOL) tablet 650 mg  650 mg Oral Q4H PRN    morphine injection 2 mg  2 mg IntraVENous Q5MIN PRN    sodium chloride (NS) flush 5-40 mL  5-40 mL IntraVENous Q8H    sodium chloride (NS) flush 5-40 mL  5-40 mL IntraVENous PRN    morphine injection 2 mg  2 mg IntraVENous Q4H PRN    ePHEDrine in NS (PF) (MISTOLE) 10 mg/mL in NS syringe 5 mg  5 mg IntraVENous PRN    fentaNYL citrate (PF) injection 50 mcg  50 mcg IntraVENous Multiple    HYDROcodone-acetaminophen (NORCO) 5-325 mg per tablet 1 Tab  1 Tab Oral PRN    HYDROmorphone (DILAUDID) syringe 0.4 mg  0.4 mg IntraVENous Multiple    midazolam (VERSED) injection 0.5 mg  0.5 mg IntraVENous Q5MIN PRN    ondansetron (ZOFRAN) injection 4 mg  4 mg IntraVENous PRN    sodium chloride (NS) flush 5-40 mL  5-40 mL IntraVENous Q8H    sodium chloride (NS) flush 5-40 mL  5-40 mL IntraVENous PRN    hydrALAZINE (APRESOLINE) 20 mg/mL injection 10 mg  10 mg IntraVENous Q10MIN PRN    labetaloL (NORMODYNE;TRANDATE) 20 mg/4 mL (5 mg/mL) injection 5 mg  5 mg IntraVENous Q5MIN PRN    metoprolol (LOPRESSOR) injection 2.5 mg  2.5 mg IntraVENous Q5MIN PRN       Review of Systems:   A comprehensive review of systems was negative except for that written in the HPI. Objective:   Physical Exam:     Visit Vitals  /74 (BP 1 Location: Right upper arm, BP Patient Position: At rest)   Pulse 96   Temp 98.8 °F (37.1 °C)   Resp 18   Ht 5' 4\" (1.626 m)   Wt 51.3 kg (113 lb)   LMP  (LMP Unknown)   SpO2 97%   BMI 19.40 kg/m²      O2 Device: None (Room air)    Temp (24hrs), Av.5 °F (36.9 °C), Min:98 °F (36.7 °C), Max:98.8 °F (37.1 °C)    No intake/output data recorded. No intake/output data recorded. General:   Awake and alert   Lungs:   Clear to auscultation bilaterally. Chest wall:  No tenderness or deformity. Heart:  Regular rate and rhythm, S1, S2 normal, no murmur, click, rub or gallop. Abdomen:   Soft, non-tender. Bowel sounds normal. No masses,  No organomegaly. Extremities: Extremities normal, atraumatic, no cyanosis or edema. Pulses: 2+ and symmetric all extremities. Skin: Skin color, texture, turgor normal. No rashes or lesions   Neurologic: CNII-XII intact. No gross focal deficits         Data Review:       Recent Days:  Recent Labs     04/15/21  1143   WBC 8.3   HGB 15.9   HCT 46.9        Recent Labs     04/15/21  1143      K 3.2*      CO2 30   *   BUN 10   CREA 0.78   CA 9.6   ALB 3.7   TBILI 0.5   ALT 37     No results for input(s): PH, PCO2, PO2, HCO3, FIO2 in the last 72 hours.     24 Hour Results:  Recent Results (from the past 24 hour(s))   EKG, 12 LEAD, INITIAL    Collection Time: 04/15/21 11:24 AM   Result Value Ref Range    Ventricular Rate 123 BPM    Atrial Rate 123 BPM    P-R Interval 152 ms    QRS Duration 82 ms    Q-T Interval 310 ms    QTC Calculation (Bezet) 443 ms    Calculated P Axis -14 degrees    Calculated R Axis -16 degrees    Calculated T Axis -17 degrees Diagnosis       Sinus tachycardia  Inferior infarct , age undetermined  Abnormal ECG  When compared with ECG of 04-FEB-2003 10:58,  Vent. rate has increased BY  52 BPM  Inferior infarct is now Present  Inverted T waves have replaced nonspecific T wave abnormality in Inferior   leads  Confirmed by Asha Vega ([id number]) on 4/15/2021 12:40:24 PM     CBC WITH AUTOMATED DIFF    Collection Time: 04/15/21 11:43 AM   Result Value Ref Range    WBC 8.3 3.6 - 11.0 K/uL    RBC 5.08 3.80 - 5.20 M/uL    HGB 15.9 11.5 - 16.0 g/dL    HCT 46.9 35.0 - 47.0 %    MCV 92.3 80.0 - 99.0 FL    MCH 31.3 26.0 - 34.0 PG    MCHC 33.9 30.0 - 36.5 g/dL    RDW 14.7 (H) 11.5 - 14.5 %    PLATELET 533 688 - 300 K/uL    MPV 11.5 8.9 - 12.9 FL    NEUTROPHILS 73 32 - 75 %    LYMPHOCYTES 19 12 - 49 %    MONOCYTES 8 5 - 13 %    EOSINOPHILS 0 0 - 7 %    BASOPHILS 0 0 - 1 %    IMMATURE GRANULOCYTES 0 0.0 - 0.5 %    ABS. NEUTROPHILS 6.1 1.8 - 8.0 K/UL    ABS. LYMPHOCYTES 1.5 0.8 - 3.5 K/UL    ABS. MONOCYTES 0.7 0.0 - 1.0 K/UL    ABS. EOSINOPHILS 0.0 0.0 - 0.4 K/UL    ABS. BASOPHILS 0.0 0.0 - 0.1 K/UL    ABS. IMM. GRANS. 0.0 0.00 - 0.04 K/UL    DF AUTOMATED     METABOLIC PANEL, COMPREHENSIVE    Collection Time: 04/15/21 11:43 AM   Result Value Ref Range    Sodium 139 136 - 145 mmol/L    Potassium 3.2 (L) 3.5 - 5.1 mmol/L    Chloride 102 97 - 108 mmol/L    CO2 30 21 - 32 mmol/L    Anion gap 7 5 - 15 mmol/L    Glucose 126 (H) 65 - 100 mg/dL    BUN 10 6 - 20 mg/dL    Creatinine 0.78 0.55 - 1.02 mg/dL    BUN/Creatinine ratio 13 12 - 20      GFR est AA >60 >60 ml/min/1.73m2    GFR est non-AA >60 >60 ml/min/1.73m2    Calcium 9.6 8.5 - 10.1 mg/dL    Bilirubin, total 0.5 0.2 - 1.0 mg/dL    AST (SGOT) 43 (H) 15 - 37 U/L    ALT (SGPT) 37 12 - 78 U/L    Alk.  phosphatase 124 (H) 45 - 117 U/L    Protein, total 7.7 6.4 - 8.2 g/dL    Albumin 3.7 3.5 - 5.0 g/dL    Globulin 4.0 2.0 - 4.0 g/dL    A-G Ratio 0.9 (L) 1.1 - 2.2     TROPONIN I    Collection Time: 04/15/21 11:43 AM Result Value Ref Range    Troponin-I, Qt. 2.19 (H) <0.05 ng/mL   URINALYSIS W/ REFLEX CULTURE    Collection Time: 04/15/21 11:43 AM    Specimen: Urine   Result Value Ref Range    Color Yellow/Straw      Appearance Clear Clear      Specific gravity 1.014 1.003 - 1.030      pH (UA) 5.0 5.0 - 8.0      Protein Negative Negative mg/dL    Glucose Negative Negative mg/dL    Ketone 20 (A) Negative mg/dL    Bilirubin Negative Negative      Blood Moderate (A) Negative      Urobilinogen 0.1 0.1 - 1.0 EU/dL    Nitrites Negative Negative      Leukocyte Esterase Small (A) Negative      UA:UC IF INDICATED Urine Culture Ordered (A) Culture not indicated by UA result      WBC 10-20 0 - 4 /hpf    RBC 20-50 0 - 5 /hpf    Bacteria Negative Negative /hpf    Mucus Trace /lpf   LIPASE    Collection Time: 04/15/21 11:43 AM   Result Value Ref Range    Lipase 121 73 - 393 U/L   D DIMER    Collection Time: 04/15/21 11:45 AM   Result Value Ref Range    D DIMER <0.27 <0.50 ug/ml(FEU)   EKG, 12 LEAD, INITIAL    Collection Time: 04/15/21 12:52 PM   Result Value Ref Range    Ventricular Rate 109 BPM    Atrial Rate 109 BPM    P-R Interval 160 ms    QRS Duration 78 ms    Q-T Interval 330 ms    QTC Calculation (Bezet) 444 ms    Calculated P Axis 70 degrees    Calculated R Axis 55 degrees    Calculated T Axis 82 degrees    Diagnosis       Sinus tachycardia  Possible Left atrial enlargement  Borderline ECG  When compared with ECG of 15-APR-2021 11:24,  Criteria for Inferior infarct are no longer Present  T wave inversion no longer evident in Inferior leads  T wave amplitude has decreased in Lateral leads  Confirmed by Hermes Holm ([id number]) on 4/15/2021 1:36:23 PM     TROPONIN I    Collection Time: 04/15/21  1:30 PM   Result Value Ref Range    Troponin-I, Qt. 2.56 (H) <0.05 ng/mL       CTA CHEST W OR W WO CONT   Final Result   1. No pulmonary embolus.    2. Enlarged main pulmonary artery may be from pulmonary arterial hypertension or   possibly from Eyad-Danlos syndrome. 3. Thoracic aorta without aneurysm or dissection. XR CHEST PORT   Final Result           Assessment:    NSTEMI (non-ST elevated myocardial infarction), positive cardiac enzymes      Hypokalemia, likely due to above     Eyad-Danlos Syndrome     Mitral Valve Prolapse (MVP)      Multiple Sclerosis     Arthritis     Hx of UTI     Hx of recent cystourethrography (04/15/21) under general anesthesia      Hx of GERD     Plan:    Discharge today  F/u with cardiologist out-patient    Care Plan discussed with: Patient/Family    Total time spent with patient: 30 minutes. Robert Beltrán  *ATTENTION:  This note has been created by a medical student for educational purposes only. Please do not refer to the content of this note for clinical decision-making, billing, or other purposes. Please see attending physicians note to obtain clinical information on this patient. *

## 2021-04-16 NOTE — PROGRESS NOTES
Discharge plan of care/case management plan validated with provider discharge order. Patient is ready for discharge. All personal belongings. Sister is picking her up. Discharge instructions reviewed with patient, she verbalized understanding.

## 2021-04-16 NOTE — ROUTINE PROCESS
Two person skin assessment done with [facility] RN. Has a incsion on the right femoral with the dressing intact, otherwise skin is warm, dry, intact, and appropriate for ethnicity.

## 2021-04-16 NOTE — PROGRESS NOTES
Hospitalist Progress Note               Daily Progress Note: 4/16/2021      Subjective: The patient is seen for follow up. Antonino Sams is a 61 y.o. 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 The patient underwent cystoscopy the day before for urethral stretching, performed under general anesthesia.  The night post-op patient started feeling nauseated and around 3 AM woke up with 3/10 mid substernal pressure discomfort. Patient's chest discomfort after a while became intolerable with 10/10 intensity and shortness of breath prompting her to come to ED. The patient has never experienced anything like this before. Patient denied fever, chills, SOB, vomiting, dizziness, headache, diaphoresis, numbness, weakness, or swelling/edema. The patient reported actively feeling the substernal chest pressure during her H and P in the ED. In the ED, patient's EKG showed suggestion of ST elevation in lead III but repeat EKG subsequently showed no ST-T wave changes with peak troponin of 2.6. Due to her history of Eyad-Danlos syndrome, patient underwent chest CT scan which was negative for any aortic pathology. The patient then underwent cardiac cath which showed essentially normal coronary anatomy without occlusive disease. This combined with elevated troponin suggested Tako- Subu syndrome, likely from the mental and physical stress of the surgery the day before the symptoms. The cardiologist suggested medical management with ACE inhibitor and beta-blockers for patient's nonischemic cardiomyopathy. Patient was seen in her room today. She reports that she's still feeling some chest pain/pressure but it's much improved from when she came in yesterday. She appeared comfortable and in no acute distress.  The patient denied SOB, nausea, vomiting, dizziness, lightheadedness, or visual disturbances. Patient is ready to be discharged.  She should continue with recommended medications with outpatient cardiology f/u.        Problem List:  Problem List as of 4/16/2021 Date Reviewed: 4/6/2021          Codes Class Noted - Resolved    NSTEMI (non-ST elevated myocardial infarction) [facility]) ICD-10-CM: I21.4  ICD-9-CM: 410.70  4/15/2021 - Present        Abscess of bulbourethral gland ICD-10-CM: N34.0  ICD-9-CM: 597.0  4/14/2021 - Present              Medications reviewed  Current Facility-Administered Medications   Medication Dose Route Frequency    baclofen (LIORESAL) tablet 10 mg  10 mg Oral TID    divalproex DR (DEPAKOTE) tablet 250 mg  250 mg Oral TID    gabapentin (NEURONTIN) capsule 400 mg  400 mg Oral TID    spironolactone (ALDACTONE) tablet 50 mg  50 mg Oral DAILY    teriflunomide (AUBAGIO) tablet 7 mg  7 mg Oral DAILY    sodium chloride (NS) flush 5-40 mL  5-40 mL IntraVENous Q8H    sodium chloride (NS) flush 5-40 mL  5-40 mL IntraVENous PRN    nitroglycerin (NITROBID) 2 % ointment 1 Inch  1 Inch Topical Q6H    metoprolol tartrate (LOPRESSOR) tablet 25 mg  25 mg Oral Q6H    aspirin chewable tablet 81 mg  81 mg Oral DAILY    atorvastatin (LIPITOR) tablet 80 mg  80 mg Oral QHS    acetaminophen (TYLENOL) tablet 650 mg  650 mg Oral Q4H PRN    morphine injection 2 mg  2 mg IntraVENous Q5MIN PRN    sodium chloride (NS) flush 5-40 mL  5-40 mL IntraVENous Q8H    sodium chloride (NS) flush 5-40 mL  5-40 mL IntraVENous PRN    morphine injection 2 mg  2 mg IntraVENous Q4H PRN    ePHEDrine in NS (PF) (MISTOLE) 10 mg/mL in NS syringe 5 mg  5 mg IntraVENous PRN    fentaNYL citrate (PF) injection 50 mcg  50 mcg IntraVENous Multiple    HYDROcodone-acetaminophen (NORCO) 5-325 mg per tablet 1 Tab  1 Tab Oral PRN    HYDROmorphone (DILAUDID) syringe 0.4 mg  0.4 mg IntraVENous Multiple    midazolam (VERSED) injection 0.5 mg  0.5 mg IntraVENous Q5MIN PRN    ondansetron [facility]) injection 4 mg  4 mg IntraVENous PRN    sodium chloride (NS) flush 5-40 mL  5-40 mL IntraVENous Q8H    sodium chloride (NS) flush 5-40 mL  5-40 mL IntraVENous PRN    hydrALAZINE (APRESOLINE) 20 mg/mL injection 10 mg  10 mg IntraVENous Q10MIN PRN    labetaloL (NORMODYNE;TRANDATE) 20 mg/4 mL (5 mg/mL) injection 5 mg  5 mg IntraVENous Q5MIN PRN    metoprolol (LOPRESSOR) injection 2.5 mg  2.5 mg IntraVENous Q5MIN PRN       Review of Systems:   A comprehensive review of systems was negative except for that written in the HPI. Objective:   Physical Exam:     Visit Vitals  /74 (BP 1 Location: Right upper arm, BP Patient Position: At rest)   Pulse 96   Temp 98.8 °F (37.1 °C)   Resp 18   Ht 5' 4\" (1.626 m)   Wt 51.3 kg (113 lb)   LMP  (LMP Unknown)   SpO2 97%   BMI 19.40 kg/m²      O2 Device: None (Room air)    Temp (24hrs), Av.5 °F (36.9 °C), Min:98 °F (36.7 °C), Max:98.8 °F (37.1 °C)    No intake/output data recorded. No intake/output data recorded. General:   Awake and alert   Lungs:   Clear to auscultation bilaterally. Chest wall:  No tenderness or deformity. Heart:  Regular rate and rhythm, S1, S2 normal, no murmur, click, rub or gallop. Abdomen:   Soft, non-tender. Bowel sounds normal. No masses,  No organomegaly. Extremities: Extremities normal, atraumatic, no cyanosis or edema. Pulses: 2+ and symmetric all extremities. Skin: Skin color, texture, turgor normal. No rashes or lesions   Neurologic: CNII-XII intact. No gross focal deficits         Data Review:       Recent Days:  Recent Labs     04/15/21  1143   WBC 8.3   HGB 15.9   HCT 46.9        Recent Labs     04/15/21  1143      K 3.2*      CO2 30   *   BUN 10   CREA 0.78   CA 9.6   ALB 3.7   TBILI 0.5   ALT 37     No results for input(s): PH, PCO2, PO2, HCO3, FIO2 in the last 72 hours.     24 Hour Results:  Recent Results (from the past 24 hour(s))   EKG, 12 LEAD, INITIAL    Collection Time: 04/15/21 11:24 AM   Result Value Ref Range    Ventricular Rate 123 BPM    Atrial Rate 123 BPM    P-R Interval 152 ms    QRS Duration 82 ms    Q-T Interval 310 ms    QTC Calculation (Bezet) 443 ms    Calculated P Axis -14 degrees    Calculated R Axis -16 degrees    Calculated T Axis -17 degrees    Diagnosis       Sinus tachycardia  Inferior infarct , age undetermined  Abnormal ECG  When compared with ECG of 04-FEB-2003 10:58,  Vent. rate has increased BY  52 BPM  Inferior infarct is now Present  Inverted T waves have replaced nonspecific T wave abnormality in Inferior   leads  Confirmed by Bernardo Roque ([id number]) on 4/15/2021 12:40:24 PM     CBC WITH AUTOMATED DIFF    Collection Time: 04/15/21 11:43 AM   Result Value Ref Range    WBC 8.3 3.6 - 11.0 K/uL    RBC 5.08 3.80 - 5.20 M/uL    HGB 15.9 11.5 - 16.0 g/dL    HCT 46.9 35.0 - 47.0 %    MCV 92.3 80.0 - 99.0 FL    MCH 31.3 26.0 - 34.0 PG    MCHC 33.9 30.0 - 36.5 g/dL    RDW 14.7 (H) 11.5 - 14.5 %    PLATELET 854 134 - 420 K/uL    MPV 11.5 8.9 - 12.9 FL    NEUTROPHILS 73 32 - 75 %    LYMPHOCYTES 19 12 - 49 %    MONOCYTES 8 5 - 13 %    EOSINOPHILS 0 0 - 7 %    BASOPHILS 0 0 - 1 %    IMMATURE GRANULOCYTES 0 0.0 - 0.5 %    ABS. NEUTROPHILS 6.1 1.8 - 8.0 K/UL    ABS. LYMPHOCYTES 1.5 0.8 - 3.5 K/UL    ABS. MONOCYTES 0.7 0.0 - 1.0 K/UL    ABS. EOSINOPHILS 0.0 0.0 - 0.4 K/UL    ABS. BASOPHILS 0.0 0.0 - 0.1 K/UL    ABS. IMM.  GRANS. 0.0 0.00 - 0.04 K/UL    DF AUTOMATED     METABOLIC PANEL, COMPREHENSIVE    Collection Time: 04/15/21 11:43 AM   Result Value Ref Range    Sodium 139 136 - 145 mmol/L    Potassium 3.2 (L) 3.5 - 5.1 mmol/L    Chloride 102 97 - 108 mmol/L    CO2 30 21 - 32 mmol/L    Anion gap 7 5 - 15 mmol/L    Glucose 126 (H) 65 - 100 mg/dL    BUN 10 6 - 20 mg/dL    Creatinine 0.78 0.55 - 1.02 mg/dL    BUN/Creatinine ratio 13 12 - 20      GFR est AA >60 >60 ml/min/1.73m2    GFR est non-AA >60 >60 ml/min/1.73m2    Calcium 9.6 8.5 - 10.1 mg/dL    Bilirubin, total 0.5 0.2 - 1.0 mg/dL    AST (SGOT) 43 (H) 15 - 37 U/L    ALT (SGPT) 37 12 - 78 U/L    Alk.  phosphatase 124 (H) 45 - 117 U/L    Protein, total 7.7 6.4 - 8.2 g/dL    Albumin 3.7 3.5 - 5.0 g/dL    Globulin 4.0 2.0 - 4.0 g/dL    A-G Ratio 0.9 (L) 1.1 - 2.2     TROPONIN I    Collection Time: 04/15/21 11:43 AM   Result Value Ref Range    Troponin-I, Qt. 2.19 (H) <0.05 ng/mL   URINALYSIS W/ REFLEX CULTURE    Collection Time: 04/15/21 11:43 AM    Specimen: Urine   Result Value Ref Range    Color Yellow/Straw      Appearance Clear Clear      Specific gravity 1.014 1.003 - 1.030      pH (UA) 5.0 5.0 - 8.0      Protein Negative Negative mg/dL    Glucose Negative Negative mg/dL    Ketone 20 (A) Negative mg/dL    Bilirubin Negative Negative      Blood Moderate (A) Negative      Urobilinogen 0.1 0.1 - 1.0 EU/dL    Nitrites Negative Negative      Leukocyte Esterase Small (A) Negative      UA:UC IF INDICATED Urine Culture Ordered (A) Culture not indicated by UA result      WBC 10-20 0 - 4 /hpf    RBC 20-50 0 - 5 /hpf    Bacteria Negative Negative /hpf    Mucus Trace /lpf   LIPASE    Collection Time: 04/15/21 11:43 AM   Result Value Ref Range    Lipase 121 73 - 393 U/L   D DIMER    Collection Time: 04/15/21 11:45 AM   Result Value Ref Range    D DIMER <0.27 <0.50 ug/ml(FEU)   EKG, 12 LEAD, INITIAL    Collection Time: 04/15/21 12:52 PM   Result Value Ref Range    Ventricular Rate 109 BPM    Atrial Rate 109 BPM    P-R Interval 160 ms    QRS Duration 78 ms    Q-T Interval 330 ms    QTC Calculation (Bezet) 444 ms    Calculated P Axis 70 degrees    Calculated R Axis 55 degrees    Calculated T Axis 82 degrees    Diagnosis       Sinus tachycardia  Possible Left atrial enlargement  Borderline ECG  When compared with ECG of 15-APR-2021 11:24,  Criteria for Inferior infarct are no longer Present  T wave inversion no longer evident in Inferior leads  T wave amplitude has decreased in Lateral leads  Confirmed by Drew Dick ([id number]) on 4/15/2021 1:36:23 PM     TROPONIN I    Collection Time: 04/15/21  1:30 PM   Result Value Ref Range    Troponin-I, Qt. 2.56 (H) <0.05 ng/mL       CTA CHEST W OR W WO CONT   Final Result   1. No pulmonary embolus. 2. Enlarged main pulmonary artery may be from pulmonary arterial hypertension or   possibly from Eyad-Danlos syndrome. 3. Thoracic aorta without aneurysm or dissection. XR CHEST PORT   Final Result           Assessment:  Takotsubu syndrome with EF 40%    NSTEMI (non-ST elevated myocardial infarction), positive cardiac enzymes      Hypokalemia, likely due to above     Eyad-Danlos Syndrome     Mitral Valve Prolapse (MVP)      Multiple Sclerosis     Arthritis     Hx of UTI     Hx of recent cystourethrography (04/15/21) under general anesthesia      Hx of GERD     Plan:  Add low-dose beta-blocker and ACE inhibitor  Discharge today after echocardiogram done  F/u with cardiologist out-patient    Care Plan discussed with: Patient/Family    Total time spent with patient: 30 minutes.     Corinne Nuñez MD

## 2021-04-29 ENCOUNTER — OFFICE VISIT (OUTPATIENT)
Dept: UROLOGY | Age: 56
End: 2021-04-29
Payer: MEDICARE

## 2021-04-29 VITALS
BODY MASS INDEX: 19.29 KG/M2 | WEIGHT: 113 LBS | RESPIRATION RATE: 12 BRPM | TEMPERATURE: 97.7 F | DIASTOLIC BLOOD PRESSURE: 59 MMHG | HEIGHT: 64 IN | OXYGEN SATURATION: 99 % | SYSTOLIC BLOOD PRESSURE: 115 MMHG | HEART RATE: 88 BPM

## 2021-04-29 DIAGNOSIS — N32.0 BLADDER OUTLET OBSTRUCTION: ICD-10-CM

## 2021-04-29 DIAGNOSIS — N39.0 RECURRENT UTI: Primary | ICD-10-CM

## 2021-04-29 DIAGNOSIS — R33.9 INCOMPLETE BLADDER EMPTYING: ICD-10-CM

## 2021-04-29 LAB
BILIRUB UR QL STRIP: NEGATIVE
GLUCOSE UR-MCNC: NEGATIVE MG/DL
KETONES P FAST UR STRIP-MCNC: NEGATIVE MG/DL
PH UR STRIP: 5.5 [PH] (ref 4.6–8)
PROT UR QL STRIP: NEGATIVE
SP GR UR STRIP: 1.03 (ref 1–1.03)
UA UROBILINOGEN AMB POC: NORMAL (ref 0.2–1)
URINALYSIS CLARITY POC: CLEAR
URINALYSIS COLOR POC: YELLOW
URINE BLOOD POC: NEGATIVE
URINE LEUKOCYTES POC: NORMAL
URINE NITRITES POC: NEGATIVE

## 2021-04-29 PROCEDURE — 3017F COLORECTAL CA SCREEN DOC REV: CPT | Performed by: UROLOGY

## 2021-04-29 PROCEDURE — G8510 SCR DEP NEG, NO PLAN REQD: HCPCS | Performed by: UROLOGY

## 2021-04-29 PROCEDURE — G8427 DOCREV CUR MEDS BY ELIG CLIN: HCPCS | Performed by: UROLOGY

## 2021-04-29 PROCEDURE — G8420 CALC BMI NORM PARAMETERS: HCPCS | Performed by: UROLOGY

## 2021-04-29 PROCEDURE — 1111F DSCHRG MED/CURRENT MED MERGE: CPT | Performed by: UROLOGY

## 2021-04-29 PROCEDURE — 99214 OFFICE O/P EST MOD 30 MIN: CPT | Performed by: UROLOGY

## 2021-04-29 PROCEDURE — 81003 URINALYSIS AUTO W/O SCOPE: CPT | Performed by: UROLOGY

## 2021-04-29 RX ORDER — AMOXICILLIN 500 MG/1
CAPSULE ORAL
COMMUNITY
Start: 2021-03-05 | End: 2021-04-29 | Stop reason: ALTCHOICE

## 2021-04-29 RX ORDER — AMOXICILLIN AND CLAVULANATE POTASSIUM 875; 125 MG/1; MG/1
TABLET, FILM COATED ORAL
COMMUNITY
Start: 2021-02-24 | End: 2021-04-29 | Stop reason: ALTCHOICE

## 2021-04-29 RX ORDER — CEFUROXIME AXETIL 250 MG/1
TABLET ORAL
COMMUNITY
Start: 2021-02-22 | End: 2021-04-29 | Stop reason: ALTCHOICE

## 2021-04-29 RX ORDER — PHENAZOPYRIDINE HYDROCHLORIDE 200 MG/1
TABLET, FILM COATED ORAL
COMMUNITY
Start: 2021-03-18 | End: 2021-04-29 | Stop reason: ALTCHOICE

## 2021-04-29 NOTE — PROGRESS NOTES
HPI ROS PE NOTE          History of Present Illness   Chief complaint: Recurrent urethral stenosis with bladder outlet obstruction, follow-up after cystoscopy performed on 4/14/2021  Yazan Knight is a 61 y.o. female who presents with patient had cystourethroscopy urethral dilation bilateral retrograde pyelograms performed on 4/14/2021. She has had recurrent urinary tract infections with apparent bladder outlet obstruction and incomplete emptying. She was treated with Ancef and Monurol (fosfomycin) at the time of her procedure. Since she has since been doing well referable to the urinary tract. Over several days after her cystoscopy she came to the emergency room with a complaint of chest pain and was hospitalized with what was initially thought to be a myocardial infarction. It turned out that this was not actually an MI. She is followed by Dr. Christ Hsu who whom she will see as an outpatient in the next few days in follow-up. .  A condition called Takotsubo cardiomyopathy was diagnosed along with non-STEMI myocardial infarction. She was said to have an ejection fraction of 40%. Her hospital stay was about 48hours and she is doing satisfactorily at the present time.   She is voiding satisfactorily and has no symptoms of recurring urinary tract infections  Past Medical History:   Diagnosis Date    Arthritis     patient states not aware of this dx    Bladder outlet obstruction     Eyad-Danlos syndrome     GERD (gastroesophageal reflux disease)     Incomplete bladder emptying     MS (multiple sclerosis) (HCC)     MVP (mitral valve prolapse)     patient states told years ago, doesnt see cardiology    Recurrent UTI     UTI (urinary tract infection)       Past Surgical History:   Procedure Laterality Date    HX UROLOGICAL  04/14/2021    CYSTOURETHROSCOPY    HX UROLOGICAL  04/14/2021    BILATERAL  RETROGRADE PYELOGRAMS    HX UROLOGICAL  04/14/2021    URETHRAL DILATION    HX WISDOM TEETH EXTRACTION       Family History   Problem Relation Age of Onset    Hypertension Mother     Elevated Lipids Mother     Cancer Father         PROSTATE    Elevated Lipids Father       Social History     Tobacco Use    Smoking status: Never Smoker    Smokeless tobacco: Never Used   Substance Use Topics    Alcohol use: Never     Frequency: Never       Prior to Admission medications    Medication Sig Start Date End Date Taking? Authorizing Provider   lisinopriL (PRINIVIL, ZESTRIL) 2.5 mg tablet Take 1 Tab by mouth daily. 4/16/21  Yes Davon Callahan MD   metoprolol succinate (Toprol XL) 25 mg XL tablet Take 0.5 Tabs by mouth daily. 4/16/21  Yes Davon Callahan MD   aspirin 81 mg chewable tablet Take 1 Tab by mouth daily. 4/17/21  Yes Davon Callahan MD   B.ani/L.aci/L.rafael/L.plan/L. Mango (PROBIOTIC FORMULA PO) Take  by mouth. Yes Provider, Historical   teriflunomide (Aubagio) 7 mg tablet Take 7 mg by mouth daily. Yes Other, MD Jhonny   baclofen (LIORESAL) 10 mg tablet Take 10 mg by mouth three (3) times daily. 3 in the morning; 3 at noon, 3 in the evening   Yes Other, MD Jhonny   spironolactone (ALDACTONE) 50 mg tablet Take  by mouth daily. One at noon and 2 in the evening   Yes Jhonny Blanca MD   divalproex DR (Depakote) 250 mg tablet Take 250 mg by mouth two (2) times a day. 2 in the morning; 2 in the evening    Yes Rianna, MD Jhonny   gabapentin (NEURONTIN) 400 mg capsule Take 400 mg by mouth three (3) times daily.  2 in the morning; 2 at noon, 2 in the evening   Yes Rianna, MD Jhonny     Allergies   Allergen Reactions    Ciprofloxacin Rash    Fentanyl Rash    Nitrofurantoin Rash    Nortriptyline Vertigo    Prozac [Fluoxetine] Rash    Sulfa (Sulfonamide Antibiotics) Rash    Wellbutrin [Bupropion] Rash    Zofran [Ondansetron Hcl] Other (comments)     constipation    Diprivan [Propofol] Hives and Shortness of Breath        Review of Systems:  Constitutional: negative  Respiratory: negative  Cardiovascular: negative  Gastrointestinal: positive for constipation  Genitourinary:positive for frequency and nocturia  Integument/Breast: negative  Hematologic/Lymphatic: negative  Musculoskeletal:negative  Neurological: History of multiple sclerosis  Behavioral/Psychiatric: negative     Physical Exam     Physical Exam:   Visit Vitals  BP (!) 115/59 (BP 1 Location: Left upper arm, BP Patient Position: Sitting, BP Cuff Size: Adult)   Pulse 88   Temp 97.7 °F (36.5 °C) (Temporal)   Resp 12   Ht 5' 4\" (1.626 m)   Wt 113 lb (51.3 kg)   LMP  (LMP Unknown)   SpO2 99%   BMI 19.40 kg/m²     General appearance: alert, cooperative, no distress, appears stated age  Head: Normocephalic, without obvious abnormality, atraumatic  Back: symmetric, no curvature. ROM normal. No CVA tenderness. Lungs: clear to auscultation bilaterally  Heart: regular rate and rhythm, S1, S2 normal, no murmur, click, rub or gallop  Abdomen: soft, non-tender. Bowel sounds normal. No masses,  no organomegaly  Extremities: extremities normal, atraumatic, no cyanosis or edema  Pulses: 2+ and symmetric  Skin: Skin color, texture, turgor normal. No rashes or lesions    Data Review:   Recent Results (from the past 48 hour(s))   AMB POC URINALYSIS DIP STICK AUTO W/O MICRO    Collection Time: 04/29/21  4:01 PM   Result Value Ref Range    Color (UA POC) Yellow     Clarity (UA POC) Clear     Glucose (UA POC) Negative Negative    Bilirubin (UA POC) Negative Negative    Ketones (UA POC) Negative Negative    Specific gravity (UA POC) 1.030 1.001 - 1.035    Blood (UA POC) Negative Negative    pH (UA POC) 5.5 4.6 - 8.0    Protein (UA POC) Negative Negative    Urobilinogen (UA POC) 0.2 mg/dL 0.2 - 1    Nitrites (UA POC) Negative Negative    Leukocyte esterase (UA POC) Trace Negative     No results for input(s): 48 in the last 72 hours.       Assessment and Plan:   Recurrent urinary tract infections, no evidence of recurrence at this time, perform culture and sensitivity of the urine specimen from today  Bladder outlet obstruction secondary to urethral stenosis, resolved with cystourethroscopy and retrograde pyelograms with urethral dilation; leukocytes in the urine on today's specimen, will perform recheck of culture as noted; plan return visit to this office as needed. Patient asked about what could be done to prevent recurrent urethral stenosis, it has been in excess of 10 years since her last urethral dilation, I could not suggest any action that she could take other than good fluid intake at all times to retard her tendency for recurring infections, although stopping recurrent urethral stenosis is not possible. It is hoped that the current dilation will last a number of years. Takotsubo cardio myopathy with recent myocardial non-STEMI infarction; scheduled follow-up appointment and echocardiogram with Dr. Robert Angulo; extended review of hospital records from her admission after her surgical procedure discussion of findings as well as face-to-face history and physical exam and counseling with ordering of labs 25 to 30 minutes total      Ms. Paul Baires has a reminder for a \"due or due soon\" health maintenance. I have asked that she contact her primary care provider for follow-up on this health maintenance. Wille Habermann. M.D.  4/29/2021

## 2021-04-29 NOTE — LETTER
4/29/2021 Patient: Olga Lees YOB: 1965 Date of Visit: 4/29/2021 Isaac Conn MD 
[address] [place] [address],[phone] [address] Fax: [phone] Dear Isaac Conn MD, Thank you for referring Ms. Wilson Granados to [place] [address] for evaluation. My notes for this consultation are attached. If you have questions, please do not hesitate to call me. I look forward to following your patient along with you. Sincerely, Felecia Rosario MD

## 2021-05-01 LAB — BACTERIA UR CULT: NORMAL

## 2021-06-08 ENCOUNTER — TELEPHONE (OUTPATIENT)
Dept: UROLOGY | Age: 56
End: 2021-06-08

## 2021-06-08 NOTE — TELEPHONE ENCOUNTER
Spoke with patient who had a surgery done by Dr. Brian Norman this past April but now she is having UTI symptoms. Could I put her on with you for next Tuesday?  I know you usually see patients on Wed and Friday afternoons

## 2021-06-14 PROBLEM — N35.92 STRICTURE OF FEMALE URETHRA: Status: ACTIVE | Noted: 2021-06-14

## 2021-06-14 NOTE — PROGRESS NOTES
HISTORY OF PRESENT ILLNESS  Danica Lora is a 61 y.o. female. Chief Complaint   Patient presents with    New Patient    Recurrent UTI     She has been managed by Dr. Loc Nicole (and other physicians in the past). She has had several urethral dilations. She is s/p cystoscopy, bilateral RPG, and urethral dilation on 4/14/21 with Dr. Loc Nicole. Urethra was calibrated and found to be narrowed at 20-Martiniquais. Urethral dilation to 38-Martiniquais was performed with Finis La dilators. He noted additional abnormalities in the bladder or with RPG. She reports a history of recurrent UTI (see problem list). UA today without signs of infection. She reports that sometime in May, she started to feel a heaviness in her suprapubic area. She went to her PCP thinking she had an infection, but her urine was clear at that time. The suprapubic pressure is intermittent. She feels a \"heaviness\" in her bladder area. She has non-specific voiding symptoms. Some days she notes she is able to void easily. Other days she feels that she can't empty well or she has to sit for a prolonged period of time due to the pressure and \"heaviness\" in her bladder. She does not feel the need to urinate more often or with more urgency unless she has really had a lot of water (this can also keep her up at night). She has been on bladder relaxants in the past that made her feel worse. They increased her feelings of abdominal or suprapubic pressure and she felt they made her bladder \"contracted. \"    She wonders if she has cystitis. She is [facility] for [facility]. She reports no incontinence. She has no gross hematuria. No known history of kidney stones. She has had no recent imaging done. Chronic Conditions Addressed Today     1. Recurrent UTI - Primary     Overview      Symptoms of UTI: \"heavy sensation\" in the lower abdomen and pelvis, she denies fever or chills, nausea or vomiting.   She has multiple drug allergies. 4/29/21 culture with mixed urogenital lo, 25,000-50,000 colony forming units per mL.    4/14/21 culture with no growth (<1000 cfu/mL). 4/6/21 culture with proteus mirabilis, 25,000-50,000 colony forming units per mL. 2. Incomplete bladder emptying     Overview      Dr. Chapin Sky noted bladder outlet obstruction secondary to urethral stenosis; resolved with cystourethroscopy with urethral dilation on 4/14/21. 3. Stricture of female urethra     Overview      She has been managed by Dr. Manolo Fleming. She is s/p cystoscopy, bilateral RPG, and urethral dilation on 4/14/21. Urethra was calibrated and found to be narrowed at 20-Beninese. Urethral dilation to 38-Beninese was performed with Lev Schaffer dilators. Patient denies the symptoms of COVID-19 per routine screening guidelines. Review of Systems   Gastrointestinal: Positive for abdominal pain. Genitourinary: Negative for dysuria, frequency, hematuria and urgency. Past Medical History:  PMHx (including negatives):  has a past medical history of Arthritis, Bladder outlet obstruction, Eyad-Danlos syndrome, GERD (gastroesophageal reflux disease), Incomplete bladder emptying, MS (multiple sclerosis) ([facility]), MVP (mitral valve prolapse), Recurrent UTI, and UTI (urinary tract infection). PSurgHx:  has a past surgical history that includes hx wisdom teeth extraction; hx urological (04/14/2021); hx urological (04/14/2021); hx urological (04/14/2021); and hx cystostomy. PSocHx:  reports that she has never smoked. She has never used smokeless tobacco. She reports previous alcohol use. She reports that she does not use drugs. ASSESSMENT and PLAN  Diagnoses and all orders for this visit:    1. Recurrent UTI  Assessment & Plan:  No signs of infection on UA today. Orders:  -     AMB POC PVR, BARB,POST-VOID RES,US,NON-IMAGING  -     CT ABD PELV W WO CONT; Future    2.  Stricture of female urethra, unspecified stricture type  Assessment & Plan:  She is s/p cystoscopy and bilateral RPG with urethral dilation by Dr. Amarilys Guadalupe on 4/14/21. She reported initial relief of voiding symptoms afterward, but the suprapubic pressure has returned. 3. Incomplete bladder emptying  Assessment & Plan:  PVR today 0. She is emptying well. Orders:  -     AMB POC PVR, BARB,POST-VOID RES,US,NON-IMAGING  -     AMB POC URINALYSIS DIP STICK AUTO W/O MICRO  -     CT ABD PELV W WO CONT; Future    4. Generalized abdominal pain  -     CT ABD PELV W WO CONT; Future    5. Kidney stone  -     CT ABD PELV W WO CONT; Future    6. Suprapubic pressure  Assessment & Plan:  We discussed further evaluation of her voiding via cystoscopy, CMG, and Uroflow. She was educated on the procedure as it is done in the office and not under anesthesia (as she is used to). She would like to proceed. She is not likely to be passing stones as her bilateral RPG with Dr. Maximo Garsia did not note any filling defects or other abnormalities, but CT would rule them out as a causative factor also. We also discussed abdominal and pelvic imaging via CT scan to rule out any causes not related to the genitourinary tract.                 Sara Mclean, HUMZA

## 2021-06-14 NOTE — PATIENT INSTRUCTIONS
CYSTOSCOPY    Cystoscopy is a procedure that allows your doctor to look inside your bladder and your urethra (the tub that carries urine out of your body). A camera (cystoscope) is inserted into your urethra (the tube that carries urine out of your body) and is slowly advanced into your bladder where your doctor can see the inside of your bladder and your ureteral orifices. The cystoscope has a lens on the end that works like a telescope to magnify the inner surfaces of your bladder and urethra while the doctor watches on a TV screen. This procedure can be done in the office. It usually takes between 15 minutes and 30 minutes. You will be asked to remove your clothing from the waist down and lie down on the procedure table (you will be given a sheet covering). The nurse will numb your urethra with a cool jelly. That jelly will sit for a few minutes and then your doctor will insert the cystoscope. Your bladder will be filled with a sterile solution to inflate it so that your doctor can get a better look at the inside. As your bladder is filled with this solution, you may feel like you have to urinate. You may or may not be asked to urinate into a special toilet after the procedure so that the doctor can get a better idea of how you urinate. If you can't tolerate the procedure in the office, or are very nervous about it, it can be done in the hospital under anesthesia. Most people do not need this, but it is an option. If your doctor wants to perform a biopsy (tissue sample), you will need to have the procedure done in the hospital with anesthesia. Why do I need a CYSTOSCOPY?    There is blood in your urine   An imaging report (CT scan, MRI, ultrasound, etc.) showed some abnormality that needed further investigation by your doctor   Your doctor wants to evaluate your bladder or urethra for abnormalities (cancer, stones, inflammation, etc.)   Your doctor wants to evaluate if your prostate gland is enlarged (men only)   You have urinary frequency, burning, incontinence (leaking urine), or other voiding difficulties that your doctor wants to evaluate. What are the risks of CYSTOSCOPY?  INFECTION- rarely, you may develop an infection from germs passing into your urinary tract from the cystoscope.  BLEEDING- serious bleeding rarely occurs, but you may see a pink-tinge to your urine following the procedure.  PAIN- some people have abdominal discomfort or a burning sensation when urinating after cystoscopy. Sometimes, you may feel that you have to urinate more often than normal.  These symptoms are mild and typically do not last.        Results  Your doctor will discuss the results of the cystoscopy with you during your visit. You may need additional tests. He/she will inform you of that during your visit. If the procedure is done under sedation or a biopsy(tissue sample) is taken, you will discuss the findings at your follow up visit.

## 2021-06-15 ENCOUNTER — OFFICE VISIT (OUTPATIENT)
Dept: UROLOGY | Age: 56
End: 2021-06-15

## 2021-06-15 ENCOUNTER — OFFICE VISIT (OUTPATIENT)
Dept: OBGYN CLINIC | Age: 56
End: 2021-06-15
Payer: MEDICARE

## 2021-06-15 VITALS
HEART RATE: 67 BPM | BODY MASS INDEX: 19.97 KG/M2 | DIASTOLIC BLOOD PRESSURE: 53 MMHG | RESPIRATION RATE: 16 BRPM | OXYGEN SATURATION: 98 % | SYSTOLIC BLOOD PRESSURE: 119 MMHG | WEIGHT: 117 LBS | HEIGHT: 64 IN

## 2021-06-15 VITALS
HEIGHT: 64 IN | WEIGHT: 116 LBS | DIASTOLIC BLOOD PRESSURE: 71 MMHG | OXYGEN SATURATION: 99 % | HEART RATE: 84 BPM | BODY MASS INDEX: 19.81 KG/M2 | SYSTOLIC BLOOD PRESSURE: 128 MMHG

## 2021-06-15 DIAGNOSIS — N76.0 VAGINITIS AND VULVOVAGINITIS: ICD-10-CM

## 2021-06-15 DIAGNOSIS — N39.0 RECURRENT UTI: Primary | ICD-10-CM

## 2021-06-15 DIAGNOSIS — N20.0 KIDNEY STONE: ICD-10-CM

## 2021-06-15 DIAGNOSIS — Z12.4 ENCOUNTER FOR SCREENING FOR MALIGNANT NEOPLASM OF CERVIX: ICD-10-CM

## 2021-06-15 DIAGNOSIS — Z01.419 GYNECOLOGIC EXAM NORMAL: Primary | ICD-10-CM

## 2021-06-15 DIAGNOSIS — R33.9 INCOMPLETE BLADDER EMPTYING: ICD-10-CM

## 2021-06-15 DIAGNOSIS — M85.80 OSTEOPENIA, UNSPECIFIED LOCATION: ICD-10-CM

## 2021-06-15 DIAGNOSIS — N35.92 STRICTURE OF FEMALE URETHRA, UNSPECIFIED STRICTURE TYPE: ICD-10-CM

## 2021-06-15 DIAGNOSIS — R10.84 GENERALIZED ABDOMINAL PAIN: ICD-10-CM

## 2021-06-15 DIAGNOSIS — R10.2 SUPRAPUBIC PRESSURE: ICD-10-CM

## 2021-06-15 PROBLEM — N34.0: Status: RESOLVED | Noted: 2021-04-14 | Resolved: 2021-06-15

## 2021-06-15 PROBLEM — I21.4 NSTEMI (NON-ST ELEVATED MYOCARDIAL INFARCTION) (HCC): Status: RESOLVED | Noted: 2021-04-15 | Resolved: 2021-06-15

## 2021-06-15 LAB
BILIRUB UR QL STRIP: NEGATIVE
GLUCOSE UR-MCNC: NEGATIVE MG/DL
KETONES P FAST UR STRIP-MCNC: NEGATIVE MG/DL
PH UR STRIP: 7 [PH] (ref 4.6–8)
PROT UR QL STRIP: NEGATIVE
PVR POC: 0 CC
SP GR UR STRIP: 1.01 (ref 1–1.03)
UA UROBILINOGEN AMB POC: NORMAL (ref 0.2–1)
URINALYSIS CLARITY POC: CLEAR
URINALYSIS COLOR POC: YELLOW
URINE BLOOD POC: NEGATIVE
URINE LEUKOCYTES POC: NEGATIVE
URINE NITRITES POC: NEGATIVE

## 2021-06-15 PROCEDURE — 99396 PREV VISIT EST AGE 40-64: CPT | Performed by: OBSTETRICS & GYNECOLOGY

## 2021-06-15 PROCEDURE — G8420 CALC BMI NORM PARAMETERS: HCPCS | Performed by: OBSTETRICS & GYNECOLOGY

## 2021-06-15 PROCEDURE — 81003 URINALYSIS AUTO W/O SCOPE: CPT | Performed by: NURSE PRACTITIONER

## 2021-06-15 PROCEDURE — 99213 OFFICE O/P EST LOW 20 MIN: CPT | Performed by: NURSE PRACTITIONER

## 2021-06-15 PROCEDURE — G9899 SCRN MAM PERF RSLTS DOC: HCPCS | Performed by: OBSTETRICS & GYNECOLOGY

## 2021-06-15 PROCEDURE — G8427 DOCREV CUR MEDS BY ELIG CLIN: HCPCS | Performed by: NURSE PRACTITIONER

## 2021-06-15 PROCEDURE — G8510 SCR DEP NEG, NO PLAN REQD: HCPCS | Performed by: NURSE PRACTITIONER

## 2021-06-15 PROCEDURE — G8510 SCR DEP NEG, NO PLAN REQD: HCPCS | Performed by: OBSTETRICS & GYNECOLOGY

## 2021-06-15 PROCEDURE — 3017F COLORECTAL CA SCREEN DOC REV: CPT | Performed by: OBSTETRICS & GYNECOLOGY

## 2021-06-15 PROCEDURE — 51798 US URINE CAPACITY MEASURE: CPT | Performed by: NURSE PRACTITIONER

## 2021-06-15 PROCEDURE — G9899 SCRN MAM PERF RSLTS DOC: HCPCS | Performed by: NURSE PRACTITIONER

## 2021-06-15 PROCEDURE — G8420 CALC BMI NORM PARAMETERS: HCPCS | Performed by: NURSE PRACTITIONER

## 2021-06-15 RX ORDER — CARVEDILOL 3.12 MG/1
3.12 TABLET ORAL 2 TIMES DAILY WITH MEALS
COMMUNITY

## 2021-06-15 RX ORDER — ALENDRONATE SODIUM 40 MG/1
40 TABLET ORAL
Qty: 4 TABLET | Refills: 5 | Status: SHIPPED | OUTPATIENT
Start: 2021-06-15 | End: 2021-06-22 | Stop reason: DRUGHIGH

## 2021-06-15 RX ORDER — CLOBETASOL PROPIONATE 0.5 MG/G
CREAM TOPICAL 2 TIMES DAILY
Qty: 15 G | Refills: 0 | Status: SHIPPED | OUTPATIENT
Start: 2021-06-15 | End: 2021-12-06 | Stop reason: SDUPTHER

## 2021-06-15 NOTE — PROGRESS NOTES
Chief Complaint   Patient presents with    New Patient    Recurrent UTI     1. Have you been to the ER, urgent care clinic since your last visit? Hospitalized since your last visit? No    2. Have you seen or consulted any other health care providers outside of the [address] [place] [address] since your last visit? Include any pap smears or colon screening.  No  Visit Vitals  /71 (BP 1 Location: Left upper arm, BP Patient Position: Sitting, BP Cuff Size: Adult)   Pulse 84   Ht 5' 4\" (1.626 m)   Wt 116 lb (52.6 kg)   LMP  (LMP Unknown)   SpO2 99%   BMI 19.91 kg/m²

## 2021-06-15 NOTE — PATIENT INSTRUCTIONS

## 2021-06-15 NOTE — PROGRESS NOTES
Jasmine Cogan is a 61 y.o. female, , No LMP recorded (lmp unknown). Patient is postmenopausal., who presents today for the following:  Chief Complaint   Patient presents with    Annual Exam        Allergies   Allergen Reactions    Ciprofloxacin Rash    Fentanyl Rash    Nitrofurantoin Rash    Nortriptyline Vertigo    Prozac [Fluoxetine] Rash    Sulfa (Sulfonamide Antibiotics) Rash    Wellbutrin [Bupropion] Rash    Zofran [Ondansetron Hcl] Other (comments)     constipation    Diprivan [Propofol] Hives and Shortness of Breath       Current Outpatient Medications   Medication Sig    carvediloL (COREG) 3.125 mg tablet Take 3.125 mg by mouth two (2) times daily (with meals).  alendronate (FOSAMAX) 40 mg tablet Take 1 Tablet by mouth every seven (7) days.  clobetasoL (TEMOVATE) 0.05 % topical cream Apply  to affected area two (2) times a day.  aspirin 81 mg chewable tablet Take 1 Tab by mouth daily.  B.ani/L.aci/L.sal/L.plan/L. Mango (PROBIOTIC FORMULA PO) Take  by mouth.  teriflunomide (Aubagio) 7 mg tablet Take 7 mg by mouth daily.  baclofen (LIORESAL) 10 mg tablet Take 10 mg by mouth three (3) times daily. 3 in the morning; 3 at noon, 3 in the evening    spironolactone (ALDACTONE) 50 mg tablet Take  by mouth daily. One at noon and 2 in the evening    divalproex DR (Depakote) 250 mg tablet Take 250 mg by mouth two (2) times a day. 2 in the morning; 2 in the evening     gabapentin (NEURONTIN) 400 mg capsule Take 400 mg by mouth three (3) times daily. 2 in the morning; 2 at noon, 2 in the evening    lisinopriL (PRINIVIL, ZESTRIL) 2.5 mg tablet Take 1 Tab by mouth daily. (Patient not taking: Reported on 6/15/2021)    metoprolol succinate (Toprol XL) 25 mg XL tablet Take 0.5 Tabs by mouth daily. (Patient not taking: Reported on 6/15/2021)     No current facility-administered medications for this visit.        Past Medical History:   Diagnosis Date    Arthritis     patient states not aware of this dx    Bladder outlet obstruction     Eyad-Danlos syndrome     GERD (gastroesophageal reflux disease)     Incomplete bladder emptying     MS (multiple sclerosis) (HCC)     MVP (mitral valve prolapse)     patient states told years ago, doesnt see cardiology    Recurrent UTI     UTI (urinary tract infection)        Past Surgical History:   Procedure Laterality Date    HX CYSTOSTOMY      HX UROLOGICAL  04/14/2021    CYSTOURETHROSCOPY    HX UROLOGICAL  04/14/2021    BILATERAL  RETROGRADE PYELOGRAMS    HX UROLOGICAL  04/14/2021    URETHRAL DILATION    HX WISDOM TEETH EXTRACTION         Family History   Problem Relation Age of Onset    Hypertension Mother     Elevated Lipids Mother     Cancer Father         PROSTATE    Elevated Lipids Father        Social History     Socioeconomic History    Marital status:      Spouse name: Not on file    Number of children: Not on file    Years of education: Not on file    Highest education level: Not on file   Occupational History    Not on file   Tobacco Use    Smoking status: Never Smoker    Smokeless tobacco: Never Used   Vaping Use    Vaping Use: Never used   Substance and Sexual Activity    Alcohol use: Never    Drug use: Never    Sexual activity: Yes     Partners: Male   Other Topics Concern    Not on file   Social History Narrative    Not on file     Social Determinants of Health     Financial Resource Strain:     Difficulty of Paying Living Expenses:    Food Insecurity:     Worried About Running Out of Food in the Last Year:     [address] in the Last Year:    Transportation Needs:     Lack of Transportation (Medical):      Lack of Transportation (Non-Medical):    Physical Activity:     Days of Exercise per Week:     Minutes of Exercise per Session:    Stress:     Feeling of Stress :    Social Connections:     Frequency of Communication with Friends and Family:     Frequency of Social Gatherings with Friends and Family:     Attends Confucianism Services:     Active Member of Clubs or Organizations:     Attends Club or Organization Meetings:     Marital Status:    Intimate Partner Violence:     Fear of Current or Ex-Partner:     Emotionally Abused:     Physically Abused:     Sexually Abused:          HPI  Annual    Review of Systems   Constitutional: Negative. Respiratory: Negative. Cardiovascular: Negative. Gastrointestinal: Negative. Genitourinary: Positive for dysuria and frequency. Musculoskeletal: Negative. Skin: Negative. Neurological: Negative. Endo/Heme/Allergies: Negative. Psychiatric/Behavioral: Negative. All other systems reviewed and are negative. BP (!) 119/53 (BP 1 Location: Left upper arm, BP Patient Position: Sitting)   Pulse 67   Resp 16   Ht 5' 4\" (1.626 m)   Wt 117 lb (53.1 kg)   LMP  (LMP Unknown)   SpO2 98%   BMI 20.08 kg/m²    OBGyn Exam     Constitutional:      General Appearance: healthy-appearing, well-nourished, and well-developed; Level of Distress: NAD. Ambulation: ambulating normally. Psychiatric:   Insight: good judgement. Mental Status: normal mood and affect and active and alert. Orientation: to time, place, and person. Memory: recent memory normal and remote memory normal.     Head: Head: normocephalic and atraumatic. Neck:   Neck: supple, FROM, trachea midline, and no masses. Lymph Nodes: no cervical LAD, supraclavicular LAD, axillary LAD, or inguinal LAD. Thyroid: no enlargement or nodules and non-tender. Lungs:   Respiratory effort: no dyspnea. Cardiovascular:   .   Pulses including femoral / pedal: normal throughout. Breast: Breast: no masses or abnormal secretions and normal appearance. Abdomen:   : no tenderness, guarding, masses, rebound tenderness, or CVA tenderness and non-distended.        Female :   External genitalia: no lesions erythem  Vagina: moist mucosa;   Cervix: no discharge, inflammation, or cervical motion tenderness   Uterus: midline, smooth, and non-tender; normal size   Adnexae: no adnexal mass or tenderness and size WNL. Bladder and Urethra: normal bladder and urethra (except where noted). Skin:   Inspection and palpation: no rash, lesions, ulcer, induration, nodules, jaundice, or abnormal nevi and good turgor. Nails: normal.     Back: Thoracolumbar Appearance: normal curvature. 1. Gynecologic exam normal    - PAP IG, RFX APTIMA HPV ASCUS ([id number])    2. Encounter for screening for malignant neoplasm of cervix    3. Vaginitis and vulvovaginitis    - clobetasoL (TEMOVATE) 0.05 % topical cream; Apply  to affected area two (2) times a day. Dispense: 15 g; Refill: 0    4. Osteopenia, unspecified location    - alendronate (FOSAMAX) 40 mg tablet; Take 1 Tablet by mouth every seven (7) days. Dispense: 4 Tablet; Refill: 5  - DEXA BONE DENSITY STUDY AXIAL;  Future        Follow-up and Dispositions    · Return in about 1 year (around 6/15/2022) for annual.

## 2021-06-15 NOTE — ASSESSMENT & PLAN NOTE
She is s/p cystoscopy and bilateral RPG with urethral dilation by Dr. Jacob Kim on 4/14/21. She reported initial relief of voiding symptoms afterward, but the suprapubic pressure has returned.

## 2021-06-15 NOTE — ASSESSMENT & PLAN NOTE
We discussed further evaluation of her voiding via cystoscopy, CMG, and Uroflow with Dr. Kaveh Martin. She was educated on the procedure as it is done in the office and not under anesthesia (as she is used to). She would like to proceed. She would like to have her sister present for the evaluation, which should not be a problem. She is not likely to be passing stones as her bilateral RPG with Dr. Diane Kathleen did not note any filling defects or other abnormalities, but CT would rule them out as a causative factor also. We also discussed abdominal and pelvic imaging via CT scan to rule out any causes not related to the genitourinary tract.

## 2021-06-16 LAB
CYTOLOGIST CVX/VAG CYTO: NORMAL
CYTOLOGY CVX/VAG DOC CYTO: NORMAL
CYTOLOGY CVX/VAG DOC THIN PREP: NORMAL
DX ICD CODE: NORMAL
LABCORP, 190119: NORMAL
Lab: NORMAL
OTHER STN SPEC: NORMAL
STAT OF ADQ CVX/VAG CYTO-IMP: NORMAL

## 2021-06-22 ENCOUNTER — TELEPHONE (OUTPATIENT)
Dept: OBGYN CLINIC | Age: 56
End: 2021-06-22

## 2021-06-22 DIAGNOSIS — M85.80 OSTEOPENIA, UNSPECIFIED LOCATION: Primary | ICD-10-CM

## 2021-06-22 RX ORDER — ALENDRONATE SODIUM 70 MG/1
70 TABLET ORAL
Qty: 12 TABLET | Refills: 3 | Status: ON HOLD | OUTPATIENT
Start: 2021-06-22 | End: 2022-10-10

## 2021-06-22 NOTE — TELEPHONE ENCOUNTER
Patient called stating she received a call from her mail order pharmacy regarding her prescription for Fosamax. Per the pharmacy, Fosamax is on longer manufactured in the 40 mg dose. Per the patient, she has previous taken the 70 mg dose without issue. Prescription transmitted to her mail order pharmacy for the 70 mg dose.

## 2021-06-24 ENCOUNTER — HOSPITAL ENCOUNTER (OUTPATIENT)
Dept: CT IMAGING | Age: 56
Discharge: HOME | End: 2021-06-24
Attending: NURSE PRACTITIONER
Payer: MEDICARE

## 2021-06-24 ENCOUNTER — HOSPITAL ENCOUNTER (OUTPATIENT)
Dept: MAMMOGRAPHY | Age: 56
Discharge: HOME | End: 2021-06-24
Attending: OBSTETRICS & GYNECOLOGY
Payer: MEDICARE

## 2021-06-24 DIAGNOSIS — M85.80 OSTEOPENIA, UNSPECIFIED LOCATION: ICD-10-CM

## 2021-06-24 DIAGNOSIS — R10.84 GENERALIZED ABDOMINAL PAIN: ICD-10-CM

## 2021-06-24 DIAGNOSIS — N20.0 KIDNEY STONE: ICD-10-CM

## 2021-06-24 DIAGNOSIS — N39.0 RECURRENT UTI: ICD-10-CM

## 2021-06-24 DIAGNOSIS — R33.9 INCOMPLETE BLADDER EMPTYING: ICD-10-CM

## 2021-06-24 PROCEDURE — 74178 CT ABD&PLV WO CNTR FLWD CNTR: CPT

## 2021-06-24 PROCEDURE — 74011000636 HC RX REV CODE- 636: Performed by: NURSE PRACTITIONER

## 2021-06-24 PROCEDURE — 77080 DXA BONE DENSITY AXIAL: CPT

## 2021-06-24 RX ADMIN — IOPAMIDOL 100 ML: 755 INJECTION, SOLUTION INTRAVENOUS at 09:49

## 2021-06-24 NOTE — PROGRESS NOTES
Plan to discuss findings at apt on 7/7/21. Small, non-obstructive right sided renal stone; not the source of voiding symptoms or suprapubic pressure. No additional abnormal findings to explain symptoms. Planned cystoscopy, CMG, Uroflow to further evaluate voiding.

## 2021-06-28 ENCOUNTER — TELEPHONE (OUTPATIENT)
Dept: OBGYN CLINIC | Age: 56
End: 2021-06-28

## 2021-06-28 ENCOUNTER — TELEPHONE (OUTPATIENT)
Dept: UROLOGY | Age: 56
End: 2021-06-28

## 2021-06-28 DIAGNOSIS — N89.8 VAGINAL IRRITATION: Primary | ICD-10-CM

## 2021-06-28 RX ORDER — CLOTRIMAZOLE AND BETAMETHASONE DIPROPIONATE 10; .64 MG/G; MG/G
CREAM TOPICAL
Qty: 15 G | Refills: 1 | Status: SHIPPED | OUTPATIENT
Start: 2021-06-28 | End: 2021-07-13 | Stop reason: SDUPTHER

## 2021-06-28 NOTE — TELEPHONE ENCOUNTER
Returned the patient's call and she states she is red from her vagina to her rectum. Per Dr Doris Stanton, prescription for Lotrisone sent to the patient's pharmacy and she has been scheduled for a biopsy on 07/13/21.

## 2021-06-30 ENCOUNTER — TELEPHONE (OUTPATIENT)
Dept: OBGYN CLINIC | Age: 56
End: 2021-06-30

## 2021-06-30 DIAGNOSIS — N89.8 VAGINAL IRRITATION: Primary | ICD-10-CM

## 2021-06-30 RX ORDER — FLUCONAZOLE 150 MG/1
TABLET ORAL
Qty: 2 TABLET | Refills: 0 | Status: SHIPPED | OUTPATIENT
Start: 2021-06-30 | End: 2021-10-15 | Stop reason: SDUPTHER

## 2021-06-30 NOTE — TELEPHONE ENCOUNTER
Returned the patient's call and she states she is still burning. Prescription for Diflucan sent to the patient's pharmacy.

## 2021-07-02 ENCOUNTER — TELEPHONE (OUTPATIENT)
Dept: UROLOGY | Age: 56
End: 2021-07-02

## 2021-07-06 ENCOUNTER — TELEPHONE (OUTPATIENT)
Dept: UROLOGY | Age: 56
End: 2021-07-06

## 2021-07-13 ENCOUNTER — OFFICE VISIT (OUTPATIENT)
Dept: OBGYN CLINIC | Age: 56
End: 2021-07-13
Payer: MEDICARE

## 2021-07-13 VITALS
HEIGHT: 64 IN | BODY MASS INDEX: 20.14 KG/M2 | DIASTOLIC BLOOD PRESSURE: 59 MMHG | SYSTOLIC BLOOD PRESSURE: 122 MMHG | WEIGHT: 118 LBS | RESPIRATION RATE: 16 BRPM

## 2021-07-13 DIAGNOSIS — N89.8 VAGINAL IRRITATION: ICD-10-CM

## 2021-07-13 DIAGNOSIS — N76.3 CHRONIC VULVITIS: Primary | ICD-10-CM

## 2021-07-13 PROCEDURE — 11104 PUNCH BX SKIN SINGLE LESION: CPT | Performed by: OBSTETRICS & GYNECOLOGY

## 2021-07-13 PROCEDURE — G8427 DOCREV CUR MEDS BY ELIG CLIN: HCPCS | Performed by: OBSTETRICS & GYNECOLOGY

## 2021-07-13 PROCEDURE — G9899 SCRN MAM PERF RSLTS DOC: HCPCS | Performed by: OBSTETRICS & GYNECOLOGY

## 2021-07-13 PROCEDURE — 3017F COLORECTAL CA SCREEN DOC REV: CPT | Performed by: OBSTETRICS & GYNECOLOGY

## 2021-07-13 PROCEDURE — G8420 CALC BMI NORM PARAMETERS: HCPCS | Performed by: OBSTETRICS & GYNECOLOGY

## 2021-07-13 PROCEDURE — 99213 OFFICE O/P EST LOW 20 MIN: CPT | Performed by: OBSTETRICS & GYNECOLOGY

## 2021-07-13 PROCEDURE — G8510 SCR DEP NEG, NO PLAN REQD: HCPCS | Performed by: OBSTETRICS & GYNECOLOGY

## 2021-07-13 RX ORDER — CLOTRIMAZOLE AND BETAMETHASONE DIPROPIONATE 10; .64 MG/G; MG/G
CREAM TOPICAL
Qty: 15 G | Refills: 1 | Status: SHIPPED | OUTPATIENT
Start: 2021-07-13 | End: 2022-02-12

## 2021-07-19 NOTE — PROGRESS NOTES
Kate Verdin is a 61 y.o. female, , No LMP recorded (lmp unknown). Patient is postmenopausal., who presents today for the following:  Chief Complaint   Patient presents with    Gyn Exam     Pt here for biopsy of her vulva. Allergies   Allergen Reactions    Ciprofloxacin Rash    Fentanyl Rash    Nitrofurantoin Rash    Nortriptyline Vertigo    Prozac [Fluoxetine] Rash    Sulfa (Sulfonamide Antibiotics) Rash    Wellbutrin [Bupropion] Rash    Zofran [Ondansetron Hcl] Other (comments)     constipation    Diprivan [Propofol] Hives and Shortness of Breath       Current Outpatient Medications   Medication Sig    clotrimazole-betamethasone (LOTRISONE) topical cream Apply a small amount and rub into the external skin of the vagina twice a day    alendronate (FOSAMAX) 70 mg tablet Take 1 Tablet by mouth every seven (7) days.  fluconazole (DIFLUCAN) 150 mg tablet Take 1 tablet now and repeat in 4 days    carvediloL (COREG) 3.125 mg tablet Take 3.125 mg by mouth two (2) times daily (with meals).  clobetasoL (TEMOVATE) 0.05 % topical cream Apply  to affected area two (2) times a day.  aspirin 81 mg chewable tablet Take 1 Tab by mouth daily.  B.ani/L.aci/L.sal/L.plan/L. Mango (PROBIOTIC FORMULA PO) Take  by mouth.  teriflunomide (Aubagio) 7 mg tablet Take 7 mg by mouth daily.  baclofen (LIORESAL) 10 mg tablet Take 10 mg by mouth three (3) times daily. 3 in the morning; 3 at noon, 3 in the evening    spironolactone (ALDACTONE) 50 mg tablet Take  by mouth daily. One at noon and 2 in the evening    divalproex DR (Depakote) 250 mg tablet Take 250 mg by mouth two (2) times a day. 2 in the morning; 2 in the evening     gabapentin (NEURONTIN) 400 mg capsule Take 400 mg by mouth three (3) times daily. 2 in the morning; 2 at noon, 2 in the evening     No current facility-administered medications for this visit.        Past Medical History:   Diagnosis Date    Arthritis     patient states not aware of this dx    Bladder outlet obstruction     Eyad-Danlos syndrome     GERD (gastroesophageal reflux disease)     Incomplete bladder emptying     MS (multiple sclerosis) (HCC)     MVP (mitral valve prolapse)     patient states told years ago, doesnt see cardiology    Recurrent UTI     UTI (urinary tract infection)        Past Surgical History:   Procedure Laterality Date    HX CYSTOSTOMY      HX UROLOGICAL  04/14/2021    CYSTOURETHROSCOPY    HX UROLOGICAL  04/14/2021    BILATERAL  RETROGRADE PYELOGRAMS    HX UROLOGICAL  04/14/2021    URETHRAL DILATION    HX WISDOM TEETH EXTRACTION         Family History   Problem Relation Age of Onset    Hypertension Mother     Elevated Lipids Mother     Cancer Father         PROSTATE    Elevated Lipids Father        Social History     Socioeconomic History    Marital status:      Spouse name: Not on file    Number of children: Not on file    Years of education: Not on file    Highest education level: Not on file   Occupational History    Not on file   Tobacco Use    Smoking status: Never Smoker    Smokeless tobacco: Never Used   Vaping Use    Vaping Use: Never used   Substance and Sexual Activity    Alcohol use: Not Currently    Drug use: Never    Sexual activity: Yes     Partners: Male   Other Topics Concern    Not on file   Social History Narrative    Not on file     Social Determinants of Health     Financial Resource Strain:     Difficulty of Paying Living Expenses:    Food Insecurity:     Worried About Running Out of Food in the Last Year:     Ran Out of Food in the Last Year:    Transportation Needs:     Lack of Transportation (Medical):      Lack of Transportation (Non-Medical):    Physical Activity:     Days of Exercise per Week:     Minutes of Exercise per Session:    Stress:     Feeling of Stress :    Social Connections:     Frequency of Communication with Friends and Family:     Frequency of Social Gatherings with Friends and Family:     Attends Scientologist Services:     Active Member of Clubs or Organizations:     Attends Club or Organization Meetings:     Marital Status:    Intimate Partner Violence:     Fear of Current or Ex-Partner:     Emotionally Abused:     Physically Abused:     Sexually Abused:          HPI  Punch biopsy of vulvar   Chronic vulvitis with only mild resolution with topical treatments    Review of Systems   Constitutional: Negative. Respiratory: Negative. Cardiovascular: Negative. Gastrointestinal: Negative. Genitourinary: Negative. Musculoskeletal: Negative. Skin: Negative. Neurological: Negative. Endo/Heme/Allergies: Negative. Psychiatric/Behavioral: Negative. All other systems reviewed and are negative. BP (!) 122/59 (BP 1 Location: Left upper arm, BP Patient Position: Sitting)   Resp 16   Ht 5' 4\" (1.626 m)   Wt 118 lb (53.5 kg)   LMP  (LMP Unknown)   BMI 20.25 kg/m²    OBGyn Exam   PE:  Constitutional: General Appearance: healthy-appearing, well-nourished, well-developed, and well groomed. Psychiatric: Orientation: to time, place, and person. Mood and Affect: normal mood and affect and appropriate and active and alert. Abdomen: Auscultation/Inspection/Palpation: tenderness and mass and non-distended. Hernia: none palpated. Female Genitalia: Vulva: no masses, atrophy, or lesions. , erythema of vulvar    Bladder/Urethra: no urethral discharge or mass and normal meatus and bladder non distended. Vagina no tenderness, erythema, cystocele, rectocele, abnormal vaginal discharge, or vesicle(s) or ulcers. Consent obtained  Area prepped with betadine  Local anethesia applied  5mm punch sample obtained and sent to pathology  Patient tolerate procedure well  1.  Chronic vulvitis    - [place] PUNCH BIOPSY SKIN SINGLE LESION  - SURGICAL PATHOLOGY  - clotrimazole-betamethasone (LOTRISONE) topical cream; Apply a small amount and rub into the external skin of the vagina twice a day  Dispense: 15 g; Refill: 1    2. Vaginal irritation    - clotrimazole-betamethasone (LOTRISONE) topical cream; Apply a small amount and rub into the external skin of the vagina twice a day  Dispense: 15 g; Refill: 1        Follow-up and Dispositions    · Return in about 8 days (around 7/21/2021).

## 2021-07-21 ENCOUNTER — OFFICE VISIT (OUTPATIENT)
Dept: OBGYN CLINIC | Age: 56
End: 2021-07-21
Payer: MEDICARE

## 2021-07-21 VITALS
HEART RATE: 75 BPM | HEIGHT: 64 IN | SYSTOLIC BLOOD PRESSURE: 141 MMHG | BODY MASS INDEX: 20.14 KG/M2 | DIASTOLIC BLOOD PRESSURE: 75 MMHG | OXYGEN SATURATION: 98 % | RESPIRATION RATE: 16 BRPM | WEIGHT: 118 LBS

## 2021-07-21 DIAGNOSIS — N76.0 VAGINITIS AND VULVOVAGINITIS: Primary | ICD-10-CM

## 2021-07-21 PROCEDURE — 3017F COLORECTAL CA SCREEN DOC REV: CPT | Performed by: OBSTETRICS & GYNECOLOGY

## 2021-07-21 PROCEDURE — 99214 OFFICE O/P EST MOD 30 MIN: CPT | Performed by: OBSTETRICS & GYNECOLOGY

## 2021-07-21 PROCEDURE — G8427 DOCREV CUR MEDS BY ELIG CLIN: HCPCS | Performed by: OBSTETRICS & GYNECOLOGY

## 2021-07-21 PROCEDURE — G8510 SCR DEP NEG, NO PLAN REQD: HCPCS | Performed by: OBSTETRICS & GYNECOLOGY

## 2021-07-21 PROCEDURE — G9899 SCRN MAM PERF RSLTS DOC: HCPCS | Performed by: OBSTETRICS & GYNECOLOGY

## 2021-07-21 PROCEDURE — G8420 CALC BMI NORM PARAMETERS: HCPCS | Performed by: OBSTETRICS & GYNECOLOGY

## 2021-07-21 RX ORDER — FLUCONAZOLE 150 MG/1
150 TABLET ORAL DAILY
Qty: 1 TABLET | Refills: 0 | Status: SHIPPED | OUTPATIENT
Start: 2021-07-21 | End: 2021-07-23

## 2021-07-21 NOTE — PATIENT INSTRUCTIONS
Vaginal Yeast Infection: Care Instructions  Overview     A vaginal yeast infection is the growth of too many yeast cells in the vagina. This is common in women of all ages. Itching, vaginal discharge and irritation, and other symptoms can bother you. But yeast infections don't often cause other health problems. Some medicines can increase your risk of getting a yeast infection. These include antibiotics, birth control pills, hormones, and steroids. You may also be more likely to get a yeast infection if you are pregnant, have diabetes, douche, or wear tight clothes. With treatment, most yeast infections get better in 2 to 3 days. Follow-up care is a key part of your treatment and safety. Be sure to make and go to all appointments, and call your doctor if you are having problems. It's also a good idea to know your test results and keep a list of the medicines you take. How can you care for yourself at home? · Take your medicines exactly as prescribed. Call your doctor if you think you are having a problem with your medicine. · Ask your doctor about over-the-counter (OTC) medicines for yeast infections. They may cost less than prescription medicines. If you use an OTC treatment, read and follow all instructions on the label. · Don't use tampons while using a vaginal cream or suppository. The tampons can absorb the medicine. Use pads instead. · Wear loose cotton clothing. Don't wear nylon or other fabric that holds body heat and moisture close to the skin. · Try sleeping without underwear. · Don't scratch. Relieve itching with a cold pack or a cool bath. · Don't wash your vaginal area more than once a day. Use plain water or a mild, unscented soap. Air-dry the vaginal area. · Change out of wet swimsuits after swimming. · If you are using a vaginal medicine, don't have sex until you have finished your treatment. But if you do have sex, don't depend on a condom or diaphragm for birth control.  The oil in some vaginal medicines weakens latex. · Don't douche. When should you call for help? Call your doctor now or seek immediate medical care if:    · You have unexpected vaginal bleeding.     · You have new or increased pain in your vagina or pelvis. Watch closely for changes in your health, and be sure to contact your doctor if:    · You have a fever.     · You are not getting better after 2 days.     · Your symptoms come back after you finish your medicines. Where can you learn more? Go to http://www.gray.com/  Enter S789 in the search box to learn more about \"Vaginal Yeast Infection: Care Instructions. \"  Current as of: July 17, 2020               Content Version: 12.8  © 2006-2021 [employer], [employer]. Care instructions adapted under license by [employer] (which disclaims liability or warranty for this information). If you have questions about a medical condition or this instruction, always ask your healthcare professional. [address] any warranty or liability for your use of this information.

## 2021-07-22 ENCOUNTER — HOSPITAL ENCOUNTER (OUTPATIENT)
Age: 56
Setting detail: OBSERVATION
Discharge: HOME | End: 2021-07-23
Attending: HOSPITALIST | Admitting: HOSPITALIST
Payer: MEDICARE

## 2021-07-22 ENCOUNTER — APPOINTMENT (OUTPATIENT)
Dept: GENERAL RADIOLOGY | Age: 56
End: 2021-07-22
Attending: NURSE PRACTITIONER
Payer: MEDICARE

## 2021-07-22 DIAGNOSIS — R07.9 ACUTE CHEST PAIN: Primary | ICD-10-CM

## 2021-07-22 PROBLEM — E88.09 TAKATSUKI SYNDROME: Status: ACTIVE | Noted: 2021-07-22

## 2021-07-22 PROBLEM — G35 MULTIPLE SCLEROSIS (HCC): Status: ACTIVE | Noted: 2021-07-22

## 2021-07-22 PROBLEM — Q79.60 EHLERS-DANLOS SYNDROME: Status: ACTIVE | Noted: 2021-07-22

## 2021-07-22 LAB
ANION GAP SERPL CALC-SCNC: 4 MMOL/L (ref 5–15)
ATRIAL RATE: 85 BPM
BASOPHILS # BLD: 0 K/UL (ref 0–0.1)
BASOPHILS NFR BLD: 1 % (ref 0–1)
BNP SERPL-MCNC: 86 PG/ML
BUN SERPL-MCNC: 18 MG/DL (ref 6–20)
BUN/CREAT SERPL: 23 (ref 12–20)
CA-I BLD-MCNC: 9.2 MG/DL (ref 8.5–10.1)
CALCULATED P AXIS, ECG09: 76 DEGREES
CALCULATED R AXIS, ECG10: 73 DEGREES
CALCULATED T AXIS, ECG11: 73 DEGREES
CHLORIDE SERPL-SCNC: 98 MMOL/L (ref 97–108)
CO2 SERPL-SCNC: 32 MMOL/L (ref 21–32)
CPT CODES, 490044: NORMAL
CPT DISCLAIMER: NORMAL
CREAT SERPL-MCNC: 0.78 MG/DL (ref 0.55–1.02)
CYTOLOGY SPEC DOC CYTO: NORMAL
DIAGNOSIS SYNOPSIS:: NORMAL
DIAGNOSIS, 93000: NORMAL
DIFFERENTIAL METHOD BLD: ABNORMAL
DX ICD CODE: NORMAL
DX ICD CODE: NORMAL
EOSINOPHIL # BLD: 0.2 K/UL (ref 0–0.4)
EOSINOPHIL NFR BLD: 5 % (ref 0–7)
ERYTHROCYTE [DISTWIDTH] IN BLOOD BY AUTOMATED COUNT: 14 % (ref 11.5–14.5)
GLUCOSE SERPL-MCNC: 94 MG/DL (ref 65–100)
HCT VFR BLD AUTO: 48.8 % (ref 35–47)
HGB BLD-MCNC: 16.7 G/DL (ref 11.5–16)
IMM GRANULOCYTES # BLD AUTO: 0 K/UL (ref 0–0.04)
IMM GRANULOCYTES NFR BLD AUTO: 1 % (ref 0–0.5)
LYMPHOCYTES # BLD: 1 K/UL (ref 0.8–3.5)
LYMPHOCYTES NFR BLD: 23 % (ref 12–49)
MCH RBC QN AUTO: 31.4 PG (ref 26–34)
MCHC RBC AUTO-ENTMCNC: 34.2 G/DL (ref 30–36.5)
MCV RBC AUTO: 91.7 FL (ref 80–99)
MONOCYTES # BLD: 0.5 K/UL (ref 0–1)
MONOCYTES NFR BLD: 11 % (ref 5–13)
NEUTS SEG # BLD: 2.6 K/UL (ref 1.8–8)
NEUTS SEG NFR BLD: 59 % (ref 32–75)
NRBC # BLD: 0 K/UL (ref 0–0.01)
NRBC BLD-RTO: 0 PER 100 WBC
P-R INTERVAL, ECG05: 154 MS
PATH REPORT.GROSS SPEC: NORMAL
PATH REPORT.RELEVANT HX SPEC: NORMAL
PATHOLOGIST NAME: NORMAL
PLATELET # BLD AUTO: 192 K/UL (ref 150–400)
PMV BLD AUTO: 11.3 FL (ref 8.9–12.9)
POTASSIUM SERPL-SCNC: 4.5 MMOL/L (ref 3.5–5.1)
Q-T INTERVAL, ECG07: 354 MS
QRS DURATION, ECG06: 78 MS
QTC CALCULATION (BEZET), ECG08: 421 MS
RBC # BLD AUTO: 5.32 M/UL (ref 3.8–5.2)
SODIUM SERPL-SCNC: 134 MMOL/L (ref 136–145)
SPECIMEN SOURCE: NORMAL
TROPONIN I SERPL-MCNC: <0.05 NG/ML
VENTRICULAR RATE, ECG03: 85 BPM
WBC # BLD AUTO: 4.4 K/UL (ref 3.6–11)

## 2021-07-22 PROCEDURE — 85025 COMPLETE CBC W/AUTO DIFF WBC: CPT

## 2021-07-22 PROCEDURE — 83880 ASSAY OF NATRIURETIC PEPTIDE: CPT

## 2021-07-22 PROCEDURE — 80048 BASIC METABOLIC PNL TOTAL CA: CPT

## 2021-07-22 PROCEDURE — 71045 X-RAY EXAM CHEST 1 VIEW: CPT

## 2021-07-22 PROCEDURE — 99285 EMERGENCY DEPT VISIT HI MDM: CPT

## 2021-07-22 PROCEDURE — 74011250637 HC RX REV CODE- 250/637: Performed by: HOSPITALIST

## 2021-07-22 PROCEDURE — 74011250636 HC RX REV CODE- 250/636: Performed by: NURSE PRACTITIONER

## 2021-07-22 PROCEDURE — 74011250637 HC RX REV CODE- 250/637: Performed by: INTERNAL MEDICINE

## 2021-07-22 PROCEDURE — 93005 ELECTROCARDIOGRAM TRACING: CPT

## 2021-07-22 PROCEDURE — 36415 COLL VENOUS BLD VENIPUNCTURE: CPT

## 2021-07-22 PROCEDURE — 96374 THER/PROPH/DIAG INJ IV PUSH: CPT

## 2021-07-22 PROCEDURE — 96375 TX/PRO/DX INJ NEW DRUG ADDON: CPT

## 2021-07-22 PROCEDURE — 74011250637 HC RX REV CODE- 250/637: Performed by: NURSE PRACTITIONER

## 2021-07-22 PROCEDURE — 99218 HC RM OBSERVATION: CPT

## 2021-07-22 PROCEDURE — 84484 ASSAY OF TROPONIN QUANT: CPT

## 2021-07-22 RX ORDER — FAMOTIDINE 20 MG/1
20 TABLET, FILM COATED ORAL 2 TIMES DAILY
Status: DISCONTINUED | OUTPATIENT
Start: 2021-07-22 | End: 2021-07-23 | Stop reason: HOSPADM

## 2021-07-22 RX ORDER — GABAPENTIN 400 MG/1
400 CAPSULE ORAL 3 TIMES DAILY
Status: DISCONTINUED | OUTPATIENT
Start: 2021-07-22 | End: 2021-07-23 | Stop reason: HOSPADM

## 2021-07-22 RX ORDER — POLYETHYLENE GLYCOL 3350 17 G/17G
17 POWDER, FOR SOLUTION ORAL DAILY PRN
Status: DISCONTINUED | OUTPATIENT
Start: 2021-07-22 | End: 2021-07-23 | Stop reason: HOSPADM

## 2021-07-22 RX ORDER — NITROGLYCERIN 0.4 MG/1
0.4 TABLET SUBLINGUAL AS NEEDED
Status: DISCONTINUED | OUTPATIENT
Start: 2021-07-22 | End: 2021-07-23 | Stop reason: HOSPADM

## 2021-07-22 RX ORDER — SPIRONOLACTONE 25 MG/1
50 TABLET ORAL 2 TIMES DAILY
Status: DISCONTINUED | OUTPATIENT
Start: 2021-07-22 | End: 2021-07-23 | Stop reason: HOSPADM

## 2021-07-22 RX ORDER — GUAIFENESIN 100 MG/5ML
81 LIQUID (ML) ORAL DAILY
Status: DISCONTINUED | OUTPATIENT
Start: 2021-07-23 | End: 2021-07-23 | Stop reason: HOSPADM

## 2021-07-22 RX ORDER — MAG HYDROX/ALUMINUM HYD/SIMETH 200-200-20
30 SUSPENSION, ORAL (FINAL DOSE FORM) ORAL ONCE
Status: COMPLETED | OUTPATIENT
Start: 2021-07-22 | End: 2021-07-22

## 2021-07-22 RX ORDER — SODIUM CHLORIDE 0.9 % (FLUSH) 0.9 %
5-40 SYRINGE (ML) INJECTION AS NEEDED
Status: DISCONTINUED | OUTPATIENT
Start: 2021-07-22 | End: 2021-07-23 | Stop reason: HOSPADM

## 2021-07-22 RX ORDER — SODIUM CHLORIDE 0.9 % (FLUSH) 0.9 %
5-40 SYRINGE (ML) INJECTION EVERY 8 HOURS
Status: DISCONTINUED | OUTPATIENT
Start: 2021-07-22 | End: 2021-07-23 | Stop reason: HOSPADM

## 2021-07-22 RX ORDER — CLOBETASOL PROPIONATE 0.5 MG/G
1 EMULSION TOPICAL 2 TIMES DAILY
Status: DISCONTINUED | OUTPATIENT
Start: 2021-07-22 | End: 2021-07-23 | Stop reason: HOSPADM

## 2021-07-22 RX ORDER — ACETAMINOPHEN 650 MG/1
650 SUPPOSITORY RECTAL
Status: DISCONTINUED | OUTPATIENT
Start: 2021-07-22 | End: 2021-07-23 | Stop reason: HOSPADM

## 2021-07-22 RX ORDER — LOSARTAN POTASSIUM 25 MG/1
25 TABLET ORAL DAILY
Status: DISCONTINUED | OUTPATIENT
Start: 2021-07-23 | End: 2021-07-23 | Stop reason: HOSPADM

## 2021-07-22 RX ORDER — DIVALPROEX SODIUM 250 MG/1
250 TABLET, DELAYED RELEASE ORAL 2 TIMES DAILY
Status: DISCONTINUED | OUTPATIENT
Start: 2021-07-22 | End: 2021-07-23 | Stop reason: HOSPADM

## 2021-07-22 RX ORDER — CARVEDILOL 3.12 MG/1
3.12 TABLET ORAL 2 TIMES DAILY WITH MEALS
Status: DISCONTINUED | OUTPATIENT
Start: 2021-07-22 | End: 2021-07-23 | Stop reason: HOSPADM

## 2021-07-22 RX ORDER — MORPHINE SULFATE 2 MG/ML
2 INJECTION, SOLUTION INTRAMUSCULAR; INTRAVENOUS ONCE
Status: COMPLETED | OUTPATIENT
Start: 2021-07-22 | End: 2021-07-22

## 2021-07-22 RX ORDER — ONDANSETRON 2 MG/ML
4 INJECTION INTRAMUSCULAR; INTRAVENOUS
Status: COMPLETED | OUTPATIENT
Start: 2021-07-22 | End: 2021-07-22

## 2021-07-22 RX ORDER — ACETAMINOPHEN 325 MG/1
650 TABLET ORAL
Status: DISCONTINUED | OUTPATIENT
Start: 2021-07-22 | End: 2021-07-23 | Stop reason: HOSPADM

## 2021-07-22 RX ORDER — GUAIFENESIN 100 MG/5ML
243 LIQUID (ML) ORAL
Status: COMPLETED | OUTPATIENT
Start: 2021-07-22 | End: 2021-07-22

## 2021-07-22 RX ORDER — BACLOFEN 10 MG/1
10 TABLET ORAL 3 TIMES DAILY
Status: DISCONTINUED | OUTPATIENT
Start: 2021-07-22 | End: 2021-07-23 | Stop reason: HOSPADM

## 2021-07-22 RX ORDER — ENOXAPARIN SODIUM 100 MG/ML
40 INJECTION SUBCUTANEOUS DAILY
Status: DISCONTINUED | OUTPATIENT
Start: 2021-07-23 | End: 2021-07-23 | Stop reason: HOSPADM

## 2021-07-22 RX ADMIN — ALUMINUM HYDROXIDE, MAGNESIUM HYDROXIDE, AND SIMETHICONE 30 ML: 200; 200; 20 SUSPENSION ORAL at 22:41

## 2021-07-22 RX ADMIN — BACLOFEN 10 MG: 10 TABLET ORAL at 21:01

## 2021-07-22 RX ADMIN — SPIRONOLACTONE 50 MG: 25 TABLET ORAL at 21:01

## 2021-07-22 RX ADMIN — DIVALPROEX SODIUM 250 MG: 250 TABLET, DELAYED RELEASE ORAL at 21:01

## 2021-07-22 RX ADMIN — ASPIRIN 243 MG: 81 TABLET, CHEWABLE ORAL at 11:02

## 2021-07-22 RX ADMIN — Medication 10 ML: at 21:04

## 2021-07-22 RX ADMIN — GABAPENTIN 400 MG: 400 CAPSULE ORAL at 21:01

## 2021-07-22 RX ADMIN — FAMOTIDINE 20 MG: 20 TABLET, FILM COATED ORAL at 21:01

## 2021-07-22 RX ADMIN — Medication 10 ML: at 15:28

## 2021-07-22 RX ADMIN — ONDANSETRON 4 MG: 2 INJECTION INTRAMUSCULAR; INTRAVENOUS at 11:03

## 2021-07-22 RX ADMIN — MORPHINE SULFATE 2 MG: 2 INJECTION, SOLUTION INTRAMUSCULAR; INTRAVENOUS at 11:03

## 2021-07-22 NOTE — H&P
History and Physical    Patient: Sahil Penn MRN: [id number]  SSN: [id number]    YOB: 1965  Age: 61 y.o. Sex: female      Subjective:      Chief Complaint: Chest pain    HPI: Sahil Penn is a 61 y.o. female who history of Takotsubo disease, Ehler Danlos syndrome, multiple sclerosis, bladder outlet obstruction and recurrent UTI came to the hospital with a complaint of chest pain. Patient states that patient has a heavy feeling in the middle of the chest since last night. The feeling is somewhat eased off now. She states that she took ibuprofen. Patient denies any nausea vomiting or shortness of breath as such. Patient's cardiac enzymes have been negative. Hospital service was requested to admit the patient for further work-up and management. Past Medical History:   Diagnosis Date    Arthritis     patient states not aware of this dx    Bladder outlet obstruction     Eyad-Danlos syndrome     GERD (gastroesophageal reflux disease)     Incomplete bladder emptying     MS (multiple sclerosis) (HCC)     MVP (mitral valve prolapse)     patient states told years ago, doesnt see cardiology    Recurrent UTI     UTI (urinary tract infection)      Past Surgical History:   Procedure Laterality Date    HX CYSTOSTOMY      HX UROLOGICAL  04/14/2021    CYSTOURETHROSCOPY    HX UROLOGICAL  04/14/2021    BILATERAL  RETROGRADE PYELOGRAMS    HX UROLOGICAL  04/14/2021    URETHRAL DILATION    HX WISDOM TEETH EXTRACTION        Family History   Problem Relation Age of Onset    Hypertension Mother     Elevated Lipids Mother     Cancer Father         PROSTATE    Elevated Lipids Father      Social History     Tobacco Use    Smoking status: Never Smoker    Smokeless tobacco: Never Used   Substance Use Topics    Alcohol use: Not Currently      Prior to Admission medications    Medication Sig Start Date End Date Taking?  Authorizing Provider   alendronate (FOSAMAX) 70 mg tablet Take 1 Tablet by mouth every seven (7) days. 6/22/21  Yes Gibran Mcintosh MD   carvediloL (COREG) 3.125 mg tablet Take 3.125 mg by mouth two (2) times daily (with meals). Yes Provider, Historical   aspirin 81 mg chewable tablet Take 1 Tab by mouth daily. 4/17/21  Yes Tracy Valdez MD   teriflunomide (Aubagio) 7 mg tablet Take 7 mg by mouth every evening. Yes Other, MD Jhonny   baclofen (LIORESAL) 10 mg tablet Take 10 mg by mouth three (3) times daily. 3 in the morning; 3 at noon, 3 in the evening   Yes Rianna, MD Jhonny   spironolactone (ALDACTONE) 50 mg tablet Take  by mouth daily. One at noon and 2 in the evening   Yes Rianna, MD Jhonny   divalproex DR (Depakote) 250 mg tablet Take 250 mg by mouth two (2) times a day. 2 in the morning; 2 in the evening    Yes Jhonny Blanca MD   gabapentin (NEURONTIN) 400 mg capsule Take 400 mg by mouth three (3) times daily. 2 in the morning; 2 at noon, 2 in the evening   Yes Jhonny Blanca MD   fluconazole (DIFLUCAN) 150 mg tablet Take 1 Tablet by mouth daily for 1 day. FDA advises cautious prescribing of oral fluconazole in pregnancy. 7/21/21 7/22/21  Gibran Mcintosh MD   clotrimazole-betamethasone (LOTRISONE) topical cream Apply a small amount and rub into the external skin of the vagina twice a day 7/13/21   Gibran Mcintosh MD   fluconazole (DIFLUCAN) 150 mg tablet Take 1 tablet now and repeat in 4 days 6/30/21   Gibran Mcintosh MD   clobetasoL (TEMOVATE) 0.05 % topical cream Apply  to affected area two (2) times a day. 6/15/21   Gibran Mcintosh MD   B.ani/L.aci/L.rafael/L.plan/L. Mango (PROBIOTIC FORMULA PO) Take  by mouth.     Provider, Historical        Allergies   Allergen Reactions    Ciprofloxacin Rash    Fentanyl Rash    Nitrofurantoin Rash    Nortriptyline Vertigo    Prozac [Fluoxetine] Rash    Sulfa (Sulfonamide Antibiotics) Rash    Wellbutrin [Bupropion] Rash    Zofran [Ondansetron Hcl] Other (comments)     constipation    Diprivan [Propofol] Hives and Shortness of Breath Review of Systems:  Constitutional: No fevers, No chills, No fatigue, No weakness  Eyes: No visual disturbance  Ears, Nose, Mouth, Throat, and Face: No nasal congestion, No sore throat  Respiratory: No cough, No sputum, No wheezing, No SOB  Cardiovascular: ++ chest pain, No lower extremity edema, No Palpitations   Gastrointestinal: No nausea, No vomiting, No diarrhea, No constipation, No abdominal pain  Genitourinary: No frequency, No dysuria, No hematuria  Integument/Breast: No rash, No skin lesion(s), No dryness  Musculoskeletal: No arthralgias, No neck pain, No back pain  Neurological: No headaches, No dizziness, No confusion,  No seizures  Behavioral/Psychiatric: No anxiety, No depression      Objective:     Vitals:    07/22/21 0835 07/22/21 1000 07/22/21 1030 07/22/21 1111   BP: (!) 150/79 128/62 130/73 124/68   Pulse: 91 80 79 83   Resp: 18 16 18 16   Temp: 98.8 °F (37.1 °C)      SpO2: 99% 99% 99% 99%   Weight: 52.6 kg (116 lb)      Height: 5' 4\" (1.626 m)           Physical Exam:  General: Alert and Oriented x 3. Cooperative and friendly. No acute distress. HEAD: Normocephalic, atraumatic. Eye: PERRLA, EOMI. Throat and Neck: normal and no erythema or exudates noted. No mass   Lung: Clear to auscultation bilaterally without wheezes, rhonchi. Good air movement bilaterally. Heart: Regular rate and rhythm. Normal S1/S2. NO appreciated murmurs, rubs or gallops. Abdomen: soft, non-tender. Bowel sounds present in all four quadrants. No masses appreciated. Extremities:  able to move all extremities normal, atraumatic  Skin: Clean, dry and intact without appreciated lesions. Neurologic: AOx3. Cranial nerves 2-12 and sensation grossly intact.   Psychiatric: non focal, normal affect, normal thought process    Recent Labs     07/22/21  0920   WBC 4.4   HGB 16.7*   HCT 48.8*        Recent Labs     07/22/21  0920   *   K 4.5   CL 98   CO2 32   GLU 94   BUN 18   CREA 0.78   CA 9.2     No results for input(s): PH, PCO2, PO2, HCO3, FIO2 in the last 72 hours. XR CHEST PORT   Final Result   No findings of acute cardiopulmonary abnormality. Assessment:     Active Problems:    Incomplete bladder emptying ()      Overview: Dr. Farshad Rosales noted bladder outlet obstruction secondary to urethral       stenosis; resolved with cystourethroscopy with urethral dilation on       4/14/21. She still has feelings of incomplete emptying at times, but more so, she       is bothered by a suprapubic pressure. PVR 6/15/21 was 0 mls. She is emptying well. 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   Full code  Home meds reviewed    Signed By: River Handley MD     July 22, 2021

## 2021-07-22 NOTE — ED NOTES
TRANSFER - OUT REPORT:    Verbal report given to [address] [place] [address] (name) on [facility] Due  being transferred to (unit) for routine progression of care       Report consisted of patients Situation, Background, Assessment and   Recommendations(SBAR). Information from the following report(s) SBAR and ED Summary was reviewed with the receiving nurse. Lines:   Peripheral IV 07/22/21 Right Antecubital (Active)   Site Assessment Clean, dry, & intact 07/22/21 0925   Dressing Status Clean, dry, & intact 07/22/21 0925   Dressing Type Transparent 07/22/21 0925   Hub Color/Line Status Pink 07/22/21 0925        Opportunity for questions and clarification was provided.       Patient transported with:   Monitor  Tech

## 2021-07-22 NOTE — Clinical Note
Patient Class[de-identified] OBSERVATION [104]   Type of Bed: Telemetry [19]   Cardiac Monitoring Required?: Yes   Reason for Observation: chest pain   Admitting Diagnosis: Chest pain [[id number]]   Admitting Physician: Doe Burton [[id number]   Attending Physician: Doe Burton [[id number]]

## 2021-07-22 NOTE — CONSULTS
Consult    NAME: Shannan Simms   :  1965   MRN:  [id number]     Date/Time:  2021 1:02 PM    Patient PCP: Chase Oneal MD  ________________________________________________________________________    This is an inpatient cardiology consultation requested by Dr. Nuha Marsh for chest pain. Assessment:     1. Atypical chest pain which in this patient appears to be musculoskeletal in etiology. Patient is tender to palpation mid substernal area and has ruled out for myocardial infarction. Patient at home has tried nonsteroidal anti-inflammatory drugs with relief. Suspect MS related MSK chest pain. 2. Takotsubo cardiomyopathy with LV ejection fraction of 40-45% by cardiac catheterization in . Patient is well compensated from the standpoint of congestive heart failure with normal range BNP. Plan:     1. Trial of nonsteroidal anti-inflammatory drug for patient's MSK chest pain. 2. Continue home regimen of Coreg and Aldactone with losartan as tolerated for patient's Takotsubo cardiomyopathy. 3. Echocardiogram for follow-up on LV ejection fraction the patient stays in the hospital overnight. 4. I will discuss patient's case with attending physician Dr. Nuha Marsh. []        High complexity decision making was performed        Subjective:   CHIEF COMPLAINT: Chest pain    HISTORY OF PRESENT ILLNESS:     This is a pleasant 70-year-old  female with a history of Takotsubo syndrome with unremarkable cardiac catheterization, 4/15/2021, Eyad-Danlos syndrome, multiple sclerosis, bladder outlet obstruction, recurrent UTI and mitral valve prolapse who on 2021 was admitted to [facility] with complaints of chest pain following her cystoscopy with urethral dilatation procedure. Patient during the course of hospitalization ruled in for an non-STEMI with peak troponin of 2.54 which led to cardiac catheterization.   Patient's cardiac catheterization showed no occlusive epicardial coronary disease with akinetic and aneurysmal anterior inferior wall with LV ejection fraction of 40-45%. Patient's overall assessment was suggestive of Takotsubo syndrome. Patient now comes to emergency room with complaints of mid substernal achy pressure of 1 day duration. Patient's discomfort is 6/10 intensity and at a given time last for 10 to 15 minutes. Patient reports some palpitations. Patient at home has tried Ibuprofen with relief. Denies any nausea, vomiting, shortness of breath, chest fluttering or dizziness. Patient's EKG, cardiac enzymes and BNP in the hospital have been unremarkable.   This is initiated show me the Takotsubo syndrome    Past Medical History:   Diagnosis Date    Arthritis     patient states not aware of this dx    Bladder outlet obstruction     Eyad-Danlos syndrome     GERD (gastroesophageal reflux disease)     Incomplete bladder emptying     MS (multiple sclerosis) (HCC)     MVP (mitral valve prolapse)     patient states told years ago, doesnt see cardiology    Recurrent UTI     UTI (urinary tract infection)       Past Surgical History:   Procedure Laterality Date    HX CYSTOSTOMY      HX UROLOGICAL  04/14/2021    CYSTOURETHROSCOPY    HX UROLOGICAL  04/14/2021    BILATERAL  RETROGRADE PYELOGRAMS    HX UROLOGICAL  04/14/2021    URETHRAL DILATION    HX WISDOM TEETH EXTRACTION       Allergies   Allergen Reactions    Ciprofloxacin Rash    Fentanyl Rash    Nitrofurantoin Rash    Nortriptyline Vertigo    Prozac [Fluoxetine] Rash    Sulfa (Sulfonamide Antibiotics) Rash    Wellbutrin [Bupropion] Rash    Zofran [Ondansetron Hcl] Other (comments)     constipation    Diprivan [Propofol] Hives and Shortness of Breath      Meds:  See below  Social History     Tobacco Use    Smoking status: Never Smoker    Smokeless tobacco: Never Used   Substance Use Topics    Alcohol use: Not Currently      Family History   Problem Relation Age of Onset  Hypertension Mother     Elevated Lipids Mother     Cancer Father         PROSTATE    Elevated Lipids Father        REVIEW OF SYSTEMS:     []         Unable to obtain  ROS due to ---   [x]         Total of 12 systems reviewed as follows:    Constitutional: negative fever, negative chills, negative weight loss  Eyes:   negative visual changes  ENT:   negative sore throat, tongue or lip swelling  Respiratory:  negative cough, negative dyspnea  Cards: As per history of present illness  GI:   negative for nausea, vomiting, diarrhea, and abdominal pain  Genitourinary: negative for frequency, dysuria  Integument:  negative for rash   Hematologic:  negative for easy bruising and gum/nose bleeding  Musculoskel: negative for myalgias,  back pain  Neurological:  negative for headaches, dizziness, vertigo, weakness  Behavl/Psych: negative for feelings of anxiety, depression     Pertinent Positives include :    Objective:      Physical Exam:    Last 24hrs VS reviewed since prior progress note. Most recent are:    Visit Vitals  /68   Pulse 83   Temp 98.8 °F (37.1 °C)   Resp 16   Ht 5' 4\" (1.626 m)   Wt 52.6 kg (116 lb)   LMP  (LMP Unknown)   SpO2 99%   BMI 19.91 kg/m²     No intake or output data in the 24 hours ending 07/22/21 1302     General Appearance: Well developed, well nourished, alert & oriented x 3,    no acute distress. Ears/Nose/Mouth/Throat: Pupils equal and round, Hearing grossly normal.  Neck: Supple. JVP within normal limits. Carotids good upstrokes, with no bruit. Chest: Lungs clear to auscultation bilaterally. Cardiovascular: Regular rate and rhythm, S1S2 normal, no murmur, rubs, gallops. Abdomen: Soft, non-tender, bowel sounds are active. No organomegaly. Extremities: No edema bilaterally. Femoral pulses +2, Distal Pulses +1. Skin: Warm and dry. Neuro: CN II-XII grossly intact, Strength and sensation grossly intact.     []         Post-cath site without hematoma, bruit, tenderness, or thrill. Distal pulses intact. Data:      Telemetry:    EKG: Sinus rhythm with mild left atrial enlargement. No ischemic changes noted  []  No new EKG for review. XR CHEST PORT   Final Result   No findings of acute cardiopulmonary abnormality. Prior to Admission medications    Medication Sig Start Date End Date Taking? Authorizing Provider   alendronate (FOSAMAX) 70 mg tablet Take 1 Tablet by mouth every seven (7) days. 6/22/21  Yes Michael Cooper MD   carvediloL (COREG) 3.125 mg tablet Take 3.125 mg by mouth two (2) times daily (with meals). Yes Provider, Historical   aspirin 81 mg chewable tablet Take 1 Tab by mouth daily. 4/17/21  Yes Alona Pham MD   teriflunomide (Aubagio) 7 mg tablet Take 7 mg by mouth every evening. Yes Other, MD Jhonny   baclofen (LIORESAL) 10 mg tablet Take 10 mg by mouth three (3) times daily. 3 in the morning; 3 at noon, 3 in the evening   Yes Other, MD Jhonny   spironolactone (ALDACTONE) 50 mg tablet Take  by mouth daily. One at noon and 2 in the evening   Yes Other, MD Jhonny   divalproex DR (Depakote) 250 mg tablet Take 250 mg by mouth two (2) times a day. 2 in the morning; 2 in the evening    Yes Rianna, MD Jhonny   gabapentin (NEURONTIN) 400 mg capsule Take 400 mg by mouth three (3) times daily. 2 in the morning; 2 at noon, 2 in the evening   Yes Rianna, MD Jhonny   fluconazole (DIFLUCAN) 150 mg tablet Take 1 Tablet by mouth daily for 1 day. FDA advises cautious prescribing of oral fluconazole in pregnancy. 7/21/21 7/22/21  Michael Cooper MD   clotrimazole-betamethasone (LOTRISONE) topical cream Apply a small amount and rub into the external skin of the vagina twice a day 7/13/21   Michael Cooper MD   fluconazole (DIFLUCAN) 150 mg tablet Take 1 tablet now and repeat in 4 days 6/30/21   Michael Cooper MD   clobetasoL (TEMOVATE) 0.05 % topical cream Apply  to affected area two (2) times a day. 6/15/21   Michael Cooper MD   B.ani/L.aci/L.rafael/LShahramplan/L. Mango (PROBIOTIC FORMULA PO) Take  by mouth. Provider, Historical       Recent Results (from the past 24 hour(s))   EKG, 12 LEAD, INITIAL    Collection Time: 07/22/21  8:40 AM   Result Value Ref Range    Ventricular Rate 85 BPM    Atrial Rate 85 BPM    P-R Interval 154 ms    QRS Duration 78 ms    Q-T Interval 354 ms    QTC Calculation (Bezet) 421 ms    Calculated P Axis 76 degrees    Calculated R Axis 73 degrees    Calculated T Axis 73 degrees    Diagnosis       Normal sinus rhythm  Possible Left atrial enlargement  Borderline ECG  No previous ECGs available  Confirmed by Karl MCKEON MD ([id number]) on 7/22/2021 63:50:12 AM     METABOLIC PANEL, BASIC    Collection Time: 07/22/21  9:20 AM   Result Value Ref Range    Sodium 134 (L) 136 - 145 mmol/L    Potassium 4.5 3.5 - 5.1 mmol/L    Chloride 98 97 - 108 mmol/L    CO2 32 21 - 32 mmol/L    Anion gap 4 (L) 5 - 15 mmol/L    Glucose 94 65 - 100 mg/dL    BUN 18 6 - 20 mg/dL    Creatinine 0.78 0.55 - 1.02 mg/dL    BUN/Creatinine ratio 23 (H) 12 - 20      GFR est AA >60 >60 ml/min/1.73m2    GFR est non-AA >60 >60 ml/min/1.73m2    Calcium 9.2 8.5 - 10.1 mg/dL   CBC WITH AUTOMATED DIFF    Collection Time: 07/22/21  9:20 AM   Result Value Ref Range    WBC 4.4 3.6 - 11.0 K/uL    RBC 5.32 (H) 3.80 - 5.20 M/uL    HGB 16.7 (H) 11.5 - 16.0 g/dL    HCT 48.8 (H) 35.0 - 47.0 %    MCV 91.7 80.0 - 99.0 FL    MCH 31.4 26.0 - 34.0 PG    MCHC 34.2 30.0 - 36.5 g/dL    RDW 14.0 11.5 - 14.5 %    PLATELET 869 876 - 137 K/uL    MPV 11.3 8.9 - 12.9 FL    NRBC 0.0 0.0  WBC    ABSOLUTE NRBC 0.00 0.00 - 0.01 K/uL    NEUTROPHILS 59 32 - 75 %    LYMPHOCYTES 23 12 - 49 %    MONOCYTES 11 5 - 13 %    EOSINOPHILS 5 0 - 7 %    BASOPHILS 1 0 - 1 %    IMMATURE GRANULOCYTES 1 (H) 0 - 0.5 %    ABS. NEUTROPHILS 2.6 1.8 - 8.0 K/UL    ABS. LYMPHOCYTES 1.0 0.8 - 3.5 K/UL    ABS. MONOCYTES 0.5 0.0 - 1.0 K/UL    ABS. EOSINOPHILS 0.2 0.0 - 0.4 K/UL    ABS. BASOPHILS 0.0 0.0 - 0.1 K/UL    ABS. IMM.  GRANS. 0.0 0.00 - 0.04 K/UL    DF AUTOMATED     TROPONIN I    Collection Time: 07/22/21  9:20 AM   Result Value Ref Range    Troponin-I, Qt. <0.05 <0.05 ng/mL   BNP    Collection Time: 07/22/21  9:20 AM   Result Value Ref Range    NT pro-BNP 86 <125 pg/mL        Echo Results  (Last 48 hours)    None          Patient's  EKG and laboratory data were individually reviewed by me. Please send a copy of this dictation to above referring physician. Kusum Yusuf MD    This dictation was done by Dragon computer voice recognition software. Occasionally grammatical, syntax and other interpretive errors escape final proof reading. Please feel free to call me for any clarification.

## 2021-07-22 NOTE — ED PROVIDER NOTES
EMERGENCY DEPARTMENT HISTORY AND PHYSICAL EXAM      Date: 7/22/2021  Patient Name: Miracle Tirado    History of Presenting Illness     Chief Complaint   Patient presents with    Chest Pain    Shortness of Breath       History Provided By: Patient    HPI: Miracle Tirado, 61 y.o. female with a past medical history significant hypertension, hyperlipidemia and myocardial infarction presents to the ED with cc of chest pain. Patient had 2 days of chest pain. Patient has a history of broken heart syndrome. Patient took ibuprofen at home that gave her some relief but woke up again with chest pain. Moderate severity, no known exacerbating or relieving factors, no other associated signs and symptoms    There are no other complaints, changes, or physical findings at this time. PCP: Karina Cheng MD    No current facility-administered medications on file prior to encounter. Current Outpatient Medications on File Prior to Encounter   Medication Sig Dispense Refill    alendronate (FOSAMAX) 70 mg tablet Take 1 Tablet by mouth every seven (7) days. 12 Tablet 3    fluconazole (DIFLUCAN) 150 mg tablet Take 1 Tablet by mouth daily for 1 day. FDA advises cautious prescribing of oral fluconazole in pregnancy. 1 Tablet 0    clotrimazole-betamethasone (LOTRISONE) topical cream Apply a small amount and rub into the external skin of the vagina twice a day 15 g 1    fluconazole (DIFLUCAN) 150 mg tablet Take 1 tablet now and repeat in 4 days 2 Tablet 0    carvediloL (COREG) 3.125 mg tablet Take 3.125 mg by mouth two (2) times daily (with meals).  clobetasoL (TEMOVATE) 0.05 % topical cream Apply  to affected area two (2) times a day. 15 g 0    aspirin 81 mg chewable tablet Take 1 Tab by mouth daily. 30 Tab 0    B.ani/L.aci/L.sal/L.plan/L. Mango (PROBIOTIC FORMULA PO) Take  by mouth.  teriflunomide (Aubagio) 7 mg tablet Take 7 mg by mouth daily.       baclofen (LIORESAL) 10 mg tablet Take 10 mg by mouth three (3) times daily. 3 in the morning; 3 at noon, 3 in the evening      spironolactone (ALDACTONE) 50 mg tablet Take  by mouth daily. One at noon and 2 in the evening      divalproex DR (Depakote) 250 mg tablet Take 250 mg by mouth two (2) times a day. 2 in the morning; 2 in the evening       gabapentin (NEURONTIN) 400 mg capsule Take 400 mg by mouth three (3) times daily. 2 in the morning; 2 at noon, 2 in the evening         Past History     Past Medical History:  Past Medical History:   Diagnosis Date    Arthritis     patient states not aware of this dx    Bladder outlet obstruction     Eyad-Danlos syndrome     GERD (gastroesophageal reflux disease)     Incomplete bladder emptying     MS (multiple sclerosis) (HCC)     MVP (mitral valve prolapse)     patient states told years ago, doesnt see cardiology    Recurrent UTI     UTI (urinary tract infection)        Past Surgical History:  Past Surgical History:   Procedure Laterality Date    HX CYSTOSTOMY      HX UROLOGICAL  04/14/2021    CYSTOURETHROSCOPY    HX UROLOGICAL  04/14/2021    BILATERAL  RETROGRADE PYELOGRAMS    HX UROLOGICAL  04/14/2021    URETHRAL DILATION    HX WISDOM TEETH EXTRACTION         Family History:  Family History   Problem Relation Age of Onset    Hypertension Mother     Elevated Lipids Mother     Cancer Father         PROSTATE    Elevated Lipids Father        Social History:  Social History     Tobacco Use    Smoking status: Never Smoker    Smokeless tobacco: Never Used   Vaping Use    Vaping Use: Never used   Substance Use Topics    Alcohol use: Not Currently    Drug use: Never       Allergies:   Allergies   Allergen Reactions    Ciprofloxacin Rash    Fentanyl Rash    Nitrofurantoin Rash    Nortriptyline Vertigo    Prozac [Fluoxetine] Rash    Sulfa (Sulfonamide Antibiotics) Rash    Wellbutrin [Bupropion] Rash    Zofran [Ondansetron Hcl] Other (comments)     constipation    Diprivan [Propofol] Hives and Shortness of Breath         Review of Systems     Review of Systems   Constitutional: Negative for chills, fatigue and fever. HENT: Negative for congestion, sinus pressure and trouble swallowing. Eyes: Negative for photophobia and pain. Respiratory: Negative for cough and shortness of breath. Cardiovascular: Positive for chest pain. Negative for leg swelling. Gastrointestinal: Negative for abdominal pain, diarrhea, nausea and vomiting. Endocrine: Negative for polydipsia, polyphagia and polyuria. Genitourinary: Negative for decreased urine volume, difficulty urinating, dysuria, hematuria and urgency. Musculoskeletal: Negative for back pain, gait problem, myalgias and neck pain. Skin: Negative for pallor and rash. Allergic/Immunologic: Negative for environmental allergies and food allergies. Neurological: Negative for dizziness, facial asymmetry, speech difficulty, numbness and headaches. Hematological: Negative for adenopathy. Does not bruise/bleed easily. Psychiatric/Behavioral: Negative for agitation, self-injury and suicidal ideas. The patient is not nervous/anxious. Physical Exam     Physical Exam  Vitals and nursing note reviewed. Constitutional:       Appearance: Normal appearance. HENT:      Head: Atraumatic. Right Ear: Tympanic membrane and external ear normal.      Left Ear: Tympanic membrane and external ear normal.      Nose: Nose normal.      Mouth/Throat:      Mouth: Mucous membranes are moist.   Eyes:      Extraocular Movements: Extraocular movements intact. Pupils: Pupils are equal, round, and reactive to light. Cardiovascular:      Rate and Rhythm: Normal rate and regular rhythm. Pulses: Normal pulses. Heart sounds: Normal heart sounds. Pulmonary:      Breath sounds: Normal breath sounds. Abdominal:      General: Abdomen is flat. Palpations: Abdomen is soft. Musculoskeletal:         General: Normal range of motion.       Cervical back: Normal range of motion and neck supple. Skin:     General: Skin is warm and dry. Capillary Refill: Capillary refill takes less than 2 seconds. Neurological:      General: No focal deficit present. Mental Status: She is alert and oriented to person, place, and time. Mental status is at baseline.    Psychiatric:         Mood and Affect: Mood normal.         Behavior: Behavior normal.         Lab and Diagnostic Study Results     Labs -     Recent Results (from the past 12 hour(s))   EKG, 12 LEAD, INITIAL    Collection Time: 07/22/21  8:40 AM   Result Value Ref Range    Ventricular Rate 85 BPM    Atrial Rate 85 BPM    P-R Interval 154 ms    QRS Duration 78 ms    Q-T Interval 354 ms    QTC Calculation (Bezet) 421 ms    Calculated P Axis 76 degrees    Calculated R Axis 73 degrees    Calculated T Axis 73 degrees    Diagnosis       Normal sinus rhythm  Possible Left atrial enlargement  Borderline ECG  No previous ECGs available  Confirmed by LINDA VASQUEZ, Ambrocio Mason ([id number]) on 7/22/2021 41:51:54 AM     METABOLIC PANEL, BASIC    Collection Time: 07/22/21  9:20 AM   Result Value Ref Range    Sodium 134 (L) 136 - 145 mmol/L    Potassium 4.5 3.5 - 5.1 mmol/L    Chloride 98 97 - 108 mmol/L    CO2 32 21 - 32 mmol/L    Anion gap 4 (L) 5 - 15 mmol/L    Glucose 94 65 - 100 mg/dL    BUN 18 6 - 20 mg/dL    Creatinine 0.78 0.55 - 1.02 mg/dL    BUN/Creatinine ratio 23 (H) 12 - 20      GFR est AA >60 >60 ml/min/1.73m2    GFR est non-AA >60 >60 ml/min/1.73m2    Calcium 9.2 8.5 - 10.1 mg/dL   CBC WITH AUTOMATED DIFF    Collection Time: 07/22/21  9:20 AM   Result Value Ref Range    WBC 4.4 3.6 - 11.0 K/uL    RBC 5.32 (H) 3.80 - 5.20 M/uL    HGB 16.7 (H) 11.5 - 16.0 g/dL    HCT 48.8 (H) 35.0 - 47.0 %    MCV 91.7 80.0 - 99.0 FL    MCH 31.4 26.0 - 34.0 PG    MCHC 34.2 30.0 - 36.5 g/dL    RDW 14.0 11.5 - 14.5 %    PLATELET 574 729 - 022 K/uL    MPV 11.3 8.9 - 12.9 FL    NRBC 0.0 0.0  WBC    ABSOLUTE NRBC 0.00 0.00 - 0.01 K/uL    NEUTROPHILS 59 32 - 75 %    LYMPHOCYTES 23 12 - 49 %    MONOCYTES 11 5 - 13 %    EOSINOPHILS 5 0 - 7 %    BASOPHILS 1 0 - 1 %    IMMATURE GRANULOCYTES 1 (H) 0 - 0.5 %    ABS. NEUTROPHILS 2.6 1.8 - 8.0 K/UL    ABS. LYMPHOCYTES 1.0 0.8 - 3.5 K/UL    ABS. MONOCYTES 0.5 0.0 - 1.0 K/UL    ABS. EOSINOPHILS 0.2 0.0 - 0.4 K/UL    ABS. BASOPHILS 0.0 0.0 - 0.1 K/UL    ABS. IMM. GRANS. 0.0 0.00 - 0.04 K/UL    DF AUTOMATED     TROPONIN I    Collection Time: 07/22/21  9:20 AM   Result Value Ref Range    Troponin-I, Qt. <0.05 <0.05 ng/mL   BNP    Collection Time: 07/22/21  9:20 AM   Result Value Ref Range    NT pro-BNP 86 <125 pg/mL       Radiologic Studies -   @lastxrresult@  CT Results  (Last 48 hours)    None        CXR Results  (Last 48 hours)               07/22/21 0858  XR CHEST PORT Final result    Impression:  No findings of acute cardiopulmonary abnormality. Narrative:  Examination: XR CHEST PORT        History: cp       Comparison: Chest radiograph and chest CT 4/15/2021       FINDINGS:       Single frontal portable view of the chest. Symmetric lung volumes without focal   airspace consolidation, pneumothorax, or significant pleural effusion. Cardiomediastinal silhouette is within normal limits. No acute or aggressive   osseous abnormalities are evident. Medical Decision Making   - I am the first provider for this patient. - I reviewed the vital signs, available nursing notes, past medical history, past surgical history, family history and social history. - Initial assessment performed. The patients presenting problems have been discussed, and they are in agreement with the care plan formulated and outlined with them. I have encouraged them to ask questions as they arise throughout their visit. Vital Signs-Reviewed the patient's vital signs.   Patient Vitals for the past 12 hrs:   Temp Pulse Resp BP SpO2   07/22/21 1111  83 16 124/68 99 %   07/22/21 1030  79 18 130/73 99 %   07/22/21 1000  80 16 128/62 99 %   07/22/21 0835 98.8 °F (37.1 °C) 91 18 (!) 150/79 99 %       Records Reviewed: Nursing Notes and Old Medical Records          ED Course:          Provider Notes (Medical Decision Making):   DDx includes STEMI, NSTEMI, Angina, PE, Aortic Pathology, Chest Wall Pain, Pleurisy, Pneumonia, GERD/esophagitis, Anxiety. No cough/fever or focal lung findings to suggest pneumonia. No tachycardia, hypoxia or pleuritic component to suggest PE. Pulses symmetric and no extremely elevated BP/asymmetry or classic tearing sensation to suggest Aortic Dissection. Also, no neuro findings. No wretching/forceful vomiting to suggest esophageal disaster. Denies IV drug abuse, has native valves, no fevers/murmurs or skin lesions to suggest endocarditis. Will evaluate with EKG, labs, cardiac enzymes, chest x-ray. Will provide pain control and reassess. MDM       Procedures   Medical Decision Makingedical Decision Making  Performed by: Jeimy Reeder NP  PROCEDURES:  Procedures       Disposition   Disposition: Admitted to Floor Stepdown Unit the case was discussed with the admitting physician     Admitted    DISCHARGE PLAN:  1. Current Discharge Medication List      CONTINUE these medications which have NOT CHANGED    Details   alendronate (FOSAMAX) 70 mg tablet Take 1 Tablet by mouth every seven (7) days. Qty: 12 Tablet, Refills: 3    Associated Diagnoses: Osteopenia, unspecified location      !! fluconazole (DIFLUCAN) 150 mg tablet Take 1 Tablet by mouth daily for 1 day. FDA advises cautious prescribing of oral fluconazole in pregnancy.   Qty: 1 Tablet, Refills: 0    Associated Diagnoses: Vaginitis and vulvovaginitis      clotrimazole-betamethasone (LOTRISONE) topical cream Apply a small amount and rub into the external skin of the vagina twice a day  Qty: 15 g, Refills: 1    Associated Diagnoses: Chronic vulvitis; Vaginal irritation      !! fluconazole (DIFLUCAN) 150 mg tablet Take 1 tablet now and repeat in 4 days  Qty: 2 Tablet, Refills: 0    Associated Diagnoses: Vaginal irritation      carvediloL (COREG) 3.125 mg tablet Take 3.125 mg by mouth two (2) times daily (with meals). clobetasoL (TEMOVATE) 0.05 % topical cream Apply  to affected area two (2) times a day. Qty: 15 g, Refills: 0    Associated Diagnoses: Vaginitis and vulvovaginitis      aspirin 81 mg chewable tablet Take 1 Tab by mouth daily. Qty: 30 Tab, Refills: 0      B.ani/L.aci/L.sal/L.plan/L. Mango (PROBIOTIC FORMULA PO) Take  by mouth. teriflunomide (Aubagio) 7 mg tablet Take 7 mg by mouth daily. baclofen (LIORESAL) 10 mg tablet Take 10 mg by mouth three (3) times daily. 3 in the morning; 3 at noon, 3 in the evening      spironolactone (ALDACTONE) 50 mg tablet Take  by mouth daily. One at noon and 2 in the evening      divalproex DR (Depakote) 250 mg tablet Take 250 mg by mouth two (2) times a day. 2 in the morning; 2 in the evening       gabapentin (NEURONTIN) 400 mg capsule Take 400 mg by mouth three (3) times daily. 2 in the morning; 2 at noon, 2 in the evening       !! - Potential duplicate medications found. Please discuss with provider. 2.   Follow-up Information    None       3. Return to ED if worse   4. Current Discharge Medication List            Diagnosis     Clinical Impression:   1. Acute chest pain        Attestations:    Jeimy Reeder NP    Please note that this dictation was completed with [employer], the [employer] voice recognition software. Quite often unanticipated grammatical, syntax, homophones, and other interpretive errors are inadvertently transcribed by the computer software. Please disregard these errors. Please excuse any errors that have escaped final proofreading. Thank you.

## 2021-07-23 ENCOUNTER — APPOINTMENT (OUTPATIENT)
Dept: NON INVASIVE DIAGNOSTICS | Age: 56
End: 2021-07-23
Attending: HOSPITALIST
Payer: MEDICARE

## 2021-07-23 VITALS
RESPIRATION RATE: 18 BRPM | TEMPERATURE: 98.9 F | SYSTOLIC BLOOD PRESSURE: 118 MMHG | HEART RATE: 83 BPM | HEIGHT: 64 IN | WEIGHT: 116 LBS | DIASTOLIC BLOOD PRESSURE: 58 MMHG | BODY MASS INDEX: 19.81 KG/M2 | OXYGEN SATURATION: 99 %

## 2021-07-23 LAB
ANION GAP SERPL CALC-SCNC: 4 MMOL/L (ref 5–15)
ATRIAL RATE: 72 BPM
BASOPHILS # BLD: 0 K/UL (ref 0–0.1)
BASOPHILS NFR BLD: 1 % (ref 0–1)
BUN SERPL-MCNC: 14 MG/DL (ref 6–20)
BUN/CREAT SERPL: 20 (ref 12–20)
CA-I BLD-MCNC: 9.4 MG/DL (ref 8.5–10.1)
CALCULATED P AXIS, ECG09: 47 DEGREES
CALCULATED R AXIS, ECG10: 60 DEGREES
CALCULATED T AXIS, ECG11: 64 DEGREES
CHLORIDE SERPL-SCNC: 99 MMOL/L (ref 97–108)
CO2 SERPL-SCNC: 33 MMOL/L (ref 21–32)
CREAT SERPL-MCNC: 0.7 MG/DL (ref 0.55–1.02)
DIAGNOSIS, 93000: NORMAL
DIFFERENTIAL METHOD BLD: ABNORMAL
ECHO AO ROOT DIAM: 3 CM
ECHO AV PEAK GRADIENT: 6 MMHG
ECHO AV PEAK VELOCITY: 122 CM/S
ECHO EST RA PRESSURE: 3 MMHG
ECHO LA AREA 4C: 8.95 CM2
ECHO LA MAJOR AXIS: 2.4 CM
ECHO LA MINOR AXIS: 1.55 CM
ECHO LV E' SEPTAL VELOCITY: 6.82 CM/S
ECHO LV EDV A2C: 27 CM3
ECHO LV EJECTION FRACTION BIPLANE: 80.8 % (ref 55–100)
ECHO LV ESV A2C: 3.8 CM3
ECHO LV INTERNAL DIMENSION DIASTOLIC: 3 CM (ref 3.9–5.3)
ECHO LV INTERNAL DIMENSION SYSTOLIC: 1.56 CM
ECHO LV IVSD: 0.95 CM (ref 0.6–0.9)
ECHO LV MASS 2D: 73.1 G (ref 67–162)
ECHO LV MASS INDEX 2D: 47.1 G/M2 (ref 43–95)
ECHO LV POSTERIOR WALL DIASTOLIC: 0.9 CM (ref 0.6–0.9)
ECHO LVOT PEAK GRADIENT: 5 MMHG
ECHO LVOT PEAK VELOCITY: 111 CM/S
ECHO MV A VELOCITY: 79.6 CM/S
ECHO MV AREA PHT: 3.44 CM2
ECHO MV E DECELERATION TIME (DT): 215 MS
ECHO MV E VELOCITY: 79 CM/S
ECHO MV E/A RATIO: 0.99
ECHO MV E/E' SEPTAL: 11.58
ECHO MV MAX VELOCITY: 89.2 CM/S
ECHO MV MEAN GRADIENT: 1 MMHG
ECHO MV PEAK GRADIENT: 3 MMHG
ECHO MV PRESSURE HALF TIME (PHT): 64 MS
ECHO MV VTI: 19.6 CM
ECHO PV MAX VELOCITY: 83 CM/S
ECHO PV PEAK INSTANTANEOUS GRADIENT SYSTOLIC: 3 MMHG
ECHO PVEIN A DURATION: 99 MS
ECHO PVEIN A VELOCITY: 55.9 CM/S
ECHO RA AREA 4C: 7.27 CM2
ECHO RIGHT VENTRICULAR SYSTOLIC PRESSURE (RVSP): 37 MMHG
ECHO RV INTERNAL DIMENSION: 2.33 CM
ECHO TV MAX VELOCITY: 290 CM/S
ECHO TV REGURGITANT PEAK GRADIENT: 34 MMHG
EOSINOPHIL # BLD: 0.2 K/UL (ref 0–0.4)
EOSINOPHIL NFR BLD: 3 % (ref 0–7)
ERYTHROCYTE [DISTWIDTH] IN BLOOD BY AUTOMATED COUNT: 13.7 % (ref 11.5–14.5)
GLUCOSE SERPL-MCNC: 87 MG/DL (ref 65–100)
HCT VFR BLD AUTO: 47.6 % (ref 35–47)
HGB BLD-MCNC: 16.2 G/DL (ref 11.5–16)
IMM GRANULOCYTES # BLD AUTO: 0 K/UL (ref 0–0.04)
IMM GRANULOCYTES NFR BLD AUTO: 1 % (ref 0–0.5)
LYMPHOCYTES # BLD: 1.5 K/UL (ref 0.8–3.5)
LYMPHOCYTES NFR BLD: 24 % (ref 12–49)
MCH RBC QN AUTO: 31.2 PG (ref 26–34)
MCHC RBC AUTO-ENTMCNC: 34 G/DL (ref 30–36.5)
MCV RBC AUTO: 91.7 FL (ref 80–99)
MONOCYTES # BLD: 0.9 K/UL (ref 0–1)
MONOCYTES NFR BLD: 14 % (ref 5–13)
MV DEC SLOPE: 4090 MM/S2
MV DEC SLOPE: 4090 MM/S2
NEUTS SEG # BLD: 3.5 K/UL (ref 1.8–8)
NEUTS SEG NFR BLD: 57 % (ref 32–75)
NRBC # BLD: 0 K/UL (ref 0–0.01)
NRBC BLD-RTO: 0 PER 100 WBC
P-R INTERVAL, ECG05: 160 MS
PLATELET # BLD AUTO: 188 K/UL (ref 150–400)
PMV BLD AUTO: 12 FL (ref 8.9–12.9)
POTASSIUM SERPL-SCNC: 4.1 MMOL/L (ref 3.5–5.1)
Q-T INTERVAL, ECG07: 374 MS
QRS DURATION, ECG06: 84 MS
QTC CALCULATION (BEZET), ECG08: 409 MS
RBC # BLD AUTO: 5.19 M/UL (ref 3.8–5.2)
SODIUM SERPL-SCNC: 136 MMOL/L (ref 136–145)
TROPONIN I SERPL-MCNC: <0.05 NG/ML
VENTRICULAR RATE, ECG03: 72 BPM
WBC # BLD AUTO: 6.1 K/UL (ref 3.6–11)

## 2021-07-23 PROCEDURE — 74011250637 HC RX REV CODE- 250/637: Performed by: HOSPITALIST

## 2021-07-23 PROCEDURE — 84484 ASSAY OF TROPONIN QUANT: CPT

## 2021-07-23 PROCEDURE — 80048 BASIC METABOLIC PNL TOTAL CA: CPT

## 2021-07-23 PROCEDURE — 85025 COMPLETE CBC W/AUTO DIFF WBC: CPT

## 2021-07-23 PROCEDURE — 74011250636 HC RX REV CODE- 250/636: Performed by: HOSPITALIST

## 2021-07-23 PROCEDURE — 93306 TTE W/DOPPLER COMPLETE: CPT

## 2021-07-23 PROCEDURE — 74011250637 HC RX REV CODE- 250/637: Performed by: INTERNAL MEDICINE

## 2021-07-23 PROCEDURE — 99218 HC RM OBSERVATION: CPT

## 2021-07-23 RX ORDER — DIVALPROEX SODIUM 250 MG/1
500 TABLET, EXTENDED RELEASE ORAL 2 TIMES DAILY
COMMUNITY

## 2021-07-23 RX ORDER — GABAPENTIN 800 MG/1
800 TABLET ORAL 3 TIMES DAILY
COMMUNITY

## 2021-07-23 RX ORDER — CARVEDILOL 3.12 MG/1
3.12 TABLET ORAL ONCE
Status: COMPLETED | OUTPATIENT
Start: 2021-07-23 | End: 2021-07-23

## 2021-07-23 RX ORDER — LOSARTAN POTASSIUM 25 MG/1
25 TABLET ORAL DAILY
Qty: 30 TABLET | Refills: 0 | Status: SHIPPED | OUTPATIENT
Start: 2021-07-24 | End: 2022-02-12

## 2021-07-23 RX ADMIN — LOSARTAN POTASSIUM 25 MG: 25 TABLET, FILM COATED ORAL at 09:14

## 2021-07-23 RX ADMIN — DIVALPROEX SODIUM 250 MG: 250 TABLET, DELAYED RELEASE ORAL at 09:14

## 2021-07-23 RX ADMIN — NITROGLYCERIN 0.4 MG: 0.4 TABLET, ORALLY DISINTEGRATING SUBLINGUAL at 00:02

## 2021-07-23 RX ADMIN — ASPIRIN 81 MG: 81 TABLET, CHEWABLE ORAL at 09:14

## 2021-07-23 RX ADMIN — CARVEDILOL 3.12 MG: 3.12 TABLET, FILM COATED ORAL at 09:15

## 2021-07-23 RX ADMIN — GABAPENTIN 400 MG: 400 CAPSULE ORAL at 09:14

## 2021-07-23 RX ADMIN — FAMOTIDINE 20 MG: 20 TABLET, FILM COATED ORAL at 09:14

## 2021-07-23 RX ADMIN — BACLOFEN 10 MG: 10 TABLET ORAL at 09:14

## 2021-07-23 RX ADMIN — Medication 10 ML: at 16:22

## 2021-07-23 RX ADMIN — CARVEDILOL 3.12 MG: 3.12 TABLET, FILM COATED ORAL at 00:20

## 2021-07-23 RX ADMIN — Medication 10 ML: at 05:23

## 2021-07-23 RX ADMIN — SPIRONOLACTONE 50 MG: 25 TABLET ORAL at 09:14

## 2021-07-23 NOTE — DISCHARGE INSTRUCTIONS
HOSPITALIST DISCHARGE INSTRUCTIONS    NAME: Theresa Martinez   :  1965   MRN:  [id number]     Date/Time:  2021 10:34 AM    ADMIT DATE: 2021     DISCHARGE DATE: 2021     DISCHARGE DIAGNOSIS:  Principal Problem:    Chest pain (2021)    Active Problems:    Jolly Noelle syndrome (2021)      Eyad-Danlos syndrome (2021)      Multiple sclerosis ([facility].) (2021)         MEDICATIONS:  As per medication reconciliation  list  · It is important that you take the medication exactly as they are prescribed. · Keep your medication in the bottles provided by the pharmacist and keep a list of the medication names, dosages, and times to be taken in your wallet. · Do not take other medications without consulting your doctor. Pain Management: per above medications    What to do at Home    Wound care: None Needed    Indwelling devices:  None    Supplemental Oxygen: None    Required Lab work: none    Glucose management:  None    Code status: Full    Recommended diet:  DIET ADULT    Recommended activity: Activity as tolerated    If you have questions regarding the hospital related prescriptions or hospital related issues please call [phone]    If you experience any of the following symptoms then please call your primary care physician or return to the emergency room if you cannot get hold of your doctor:  Fever, chills, nausea, vomiting, diarrhea, change in mentation, falling, bleeding, shortness of breath. Follow Up: Follow-up Information     Follow up With Specialties Details Why Mikayla Gordon MD Family Medicine In 1 week  [address] [place] [address]      Candi Schaffer MD Cardiology In 2 weeks  [place] [address]  [phone]               Information obtained by :  I understand that if any problems occur once I am at home I am to contact my physician.     I understand and acknowledge receipt of the instructions indicated above.                                                                                                                                            Physician's or R.N.'s Signature                                                                  Date/Time                                                                                                                                              Patient or Representative Signature                                                          Date/Time

## 2021-07-23 NOTE — PROGRESS NOTES
Progress Note      7/23/2021 5:04 PM  NAME: Galen Delgado   MRN:  [id number]   Admit Diagnosis: Chest pain [R07.9]                Assessment Plan:       1. Atypical chest pain which in this patient appears to be musculoskeletal in etiology. Patient has ruled out for myocardial infarction. Patient chest pain now better with pain medications. Agree with the use of OTC NSAIDs for patient's musculoskeletal chest pain. 2.   Takotsubo cardiomyopathy with LV ejection fraction of 40-45% by cardiac                 catheterization in 4-2021. Continue current anti-CHF regimen consisting of             Coreg, Aldactone and losartan. LVEF now has normalized as per Echo study          of today. Patient okay to be discharged home from cardiac standpoint,            []       High complexity decision making was performed in this patient at high risk for decompensation with multiple organ involvement. Subjective:     Galen Delgado denies chest pain, dyspnea. Discussed with RN events overnight. Review of Systems:    Symptom Y/N Comments  Symptom Y/N Comments   Fever/Chills N   Chest Pain y    Poor Appetite N   Edema N    Cough N   Abdominal Pain N    Sputum N   Joint Pain N    SOB/MICHAUD N   Pruritis/Rash N    Nausea/vomit N   Tolerating PT/OT Y    Diarrhea N   Tolerating Diet Y    Constipation N   Other       Could NOT obtain due to:      Objective:      Physical Exam:    Last 24hrs VS reviewed since prior progress note. Most recent are:    Visit Vitals  BP (!) 118/58 (BP 1 Location: Left upper arm, BP Patient Position: At rest;Supine)   Pulse 83   Temp 98.9 °F (37.2 °C)   Resp 18   Ht 5' 4\" (1.626 m)   Wt 52.6 kg (116 lb)   LMP  (LMP Unknown)   SpO2 99%   Breastfeeding No   BMI 19.91 kg/m²     No intake or output data in the 24 hours ending 07/23/21 1704     General Appearance: Well developed, well nourished, alert & oriented x 3,    no acute distress.   Ears/Nose/Mouth/Throat: Hearing grossly normal.  Neck: Supple. Chest: Lungs clear to auscultation bilaterally. Cardiovascular: Regular rate and rhythm, S1S2 normal, no murmur. Abdomen: Soft, non-tender, bowel sounds are active. Extremities: No edema bilaterally. Skin: Warm and dry. []         Post-cath site without hematoma, bruit, tenderness, or thrill. Distal pulses intact. PMH/ reviewed - no change compared to H&P    Data Review    Telemetry: normal sinus rhythm     EKG:   []  No new EKG for review    XR CHEST PORT   Final Result   No findings of acute cardiopulmonary abnormality. Recent Results (from the past 24 hour(s))   TROPONIN I    Collection Time: 07/22/21  8:26 PM   Result Value Ref Range    Troponin-I, Qt. <0.05 <0.05 ng/mL   TROPONIN I    Collection Time: 07/23/21  7:00 AM   Result Value Ref Range    Troponin-I, Qt. <0.05 <3.18 ng/mL   METABOLIC PANEL, BASIC    Collection Time: 07/23/21  7:00 AM   Result Value Ref Range    Sodium 136 136 - 145 mmol/L    Potassium 4.1 3.5 - 5.1 mmol/L    Chloride 99 97 - 108 mmol/L    CO2 33 (H) 21 - 32 mmol/L    Anion gap 4 (L) 5 - 15 mmol/L    Glucose 87 65 - 100 mg/dL    BUN 14 6 - 20 mg/dL    Creatinine 0.70 0.55 - 1.02 mg/dL    BUN/Creatinine ratio 20 12 - 20      GFR est AA >60 >60 ml/min/1.73m2    GFR est non-AA >60 >60 ml/min/1.73m2    Calcium 9.4 8.5 - 10.1 mg/dL   CBC WITH AUTOMATED DIFF    Collection Time: 07/23/21  7:00 AM   Result Value Ref Range    WBC 6.1 3.6 - 11.0 K/uL    RBC 5.19 3.80 - 5.20 M/uL    HGB 16.2 (H) 11.5 - 16.0 g/dL    HCT 47.6 (H) 35.0 - 47.0 %    MCV 91.7 80.0 - 99.0 FL    MCH 31.2 26.0 - 34.0 PG    MCHC 34.0 30.0 - 36.5 g/dL    RDW 13.7 11.5 - 14.5 %    PLATELET 977 265 - 787 K/uL    MPV 12.0 8.9 - 12.9 FL    NRBC 0.0 0.0  WBC    ABSOLUTE NRBC 0.00 0.00 - 0.01 K/uL    NEUTROPHILS 57 32 - 75 %    LYMPHOCYTES 24 12 - 49 %    MONOCYTES 14 (H) 5 - 13 %    EOSINOPHILS 3 0 - 7 %    BASOPHILS 1 0 - 1 %    IMMATURE GRANULOCYTES 1 (H) 0 - 0.5 %    ABS.  NEUTROPHILS 3.5 1.8 - 8.0 K/UL    ABS. LYMPHOCYTES 1.5 0.8 - 3.5 K/UL    ABS. MONOCYTES 0.9 0.0 - 1.0 K/UL    ABS. EOSINOPHILS 0.2 0.0 - 0.4 K/UL    ABS. BASOPHILS 0.0 0.0 - 0.1 K/UL    ABS. IMM. GRANS. 0.0 0.00 - 0.04 K/UL    DF AUTOMATED     ECHO ADULT COMPLETE    Collection Time: 07/23/21 11:11 AM   Result Value Ref Range    Aortic Valve Systolic Peak Velocity 379.76 cm/s    AoV PG 6.00 mmHg    Ao Root D 3.00 cm    IVSd 0.95 (A) 0.60 - 0.90 cm    LVIDd 3.00 (A) 3.90 - 5.30 cm    LVIDs 1.56 cm    LVOT Peak Velocity 111.00 cm/s    LVOT Peak Gradient 5.00 mmHg    LVPWd 0.90 0.60 - 0.90 cm    LV E' Septal Velocity 6.82 cm/s    LV ED Vol A2C 27.00 cm3    LV ES Vol A2C 3.80 cm3    E/E' septal 11.58     Left Atrium Major Axis 2.40 cm    Mitral Valve Deceleration [place] 4,090.00 mm/s2    Mitral Valve Deceleration [place] 4,090.00 mm/s2    Mitral Valve E Wave Deceleration Time 215.00 ms    Mitral Valve Pressure Half-time 64.00 ms    MV A Pedro 79.60 cm/s    MV E Pedro 79.00 cm/s    MV Mean Gradient 1.00 mmHg    Mitral Valve Annulus Velocity Time Integral 19.60 cm    Mitral Valve Max Velocity 89.20 cm/s    MV Peak Gradient 3.00 mmHg    MVA (PHT) 3.44 cm2    MV E/A 0.99     Pulmonic Valve Max Velocity 83.00 cm/s    Pulmonic Valve Systolic Peak Instantaneous Gradient 3.00 mmHg    P Vein A Dur 99.00 ms    Pulmonary Vein \"A\" Wave Velocity 55.90 cm/s    Est. RA Pressure 3.00 mmHg    RVIDd 2.33 cm    RVSP 37.00 mmHg    Tricuspid Valve Max Velocity 290.00 cm/s    Triscuspid Valve Regurgitation Peak Gradient 34.00 mmHg    Right Atrial Area 4C 7.27 cm2    LA Area 4C 8.95 cm2    BP EF 80.8 55.0 - 100.0 %    LV Mass AL 73.1 67.0 - 162.0 g    LV Mass AL Index 47.1 43.0 - 95.0 g/m2    Left Atrium Minor Axis 1.55 cm        Echo Results  (Last 48 hours)    None          Patient's  EKG, laboratory data and echocardiogram were individually reviewed by me.       Lab Data:    Recent Labs     07/23/21  0700 07/22/21  0920   WBC 6.1 4.4   HGB 16.2* 16.7*   HCT 47.6* 48.8*    192     No results for input(s): INR, PTP, APTT, INREXT in the last 72 hours. Recent Labs     07/23/21  0700 07/22/21  0920    134*   K 4.1 4.5   CL 99 98   CO2 33* 32   BUN 14 18   CREA 0.70 0.78   GLU 87 94   CA 9.4 9.2     Recent Labs     07/23/21  0700 07/22/21 2026 07/22/21  1237   TROIQ <0.05 <0.05 <0.05     No results found for: CHOL, CHOLX, CHLST, CHOLV, HDL, HDLP, LDL, LDLC, DLDLP, TGLX, TRIGL, TRIGP, CHHD, CHHDX    No results for input(s): AP, TBIL, TP, ALB, GLOB, GGT, AML, LPSE in the last 72 hours. No lab exists for component: SGOT, GPT, AMYP, HLPSE  No results for input(s): PH, PCO2, PO2 in the last 72 hours. Medications Personally Reviewed:    Current Facility-Administered Medications   Medication Dose Route Frequency    aspirin chewable tablet 81 mg  81 mg Oral DAILY    baclofen (LIORESAL) tablet 10 mg  10 mg Oral TID    carvediloL (COREG) tablet 3.125 mg  3.125 mg Oral BID WITH MEALS    clobetasoL-emollient (TEMOVATE-E) 0.05 % topical cream 1 g  1 g Topical BID    divalproex DR (DEPAKOTE) tablet 250 mg  250 mg Oral BID    gabapentin (NEURONTIN) capsule 400 mg  400 mg Oral TID    spironolactone (ALDACTONE) tablet 50 mg  50 mg Oral BID    teriflunomide (AUBAGIO) tablet 7 mg (Patient Supplied)  7 mg Oral QPM    sodium chloride (NS) flush 5-40 mL  5-40 mL IntraVENous Q8H    sodium chloride (NS) flush 5-40 mL  5-40 mL IntraVENous PRN    acetaminophen (TYLENOL) tablet 650 mg  650 mg Oral Q6H PRN    Or    acetaminophen (TYLENOL) suppository 650 mg  650 mg Rectal Q6H PRN    polyethylene glycol (MIRALAX) packet 17 g  17 g Oral DAILY PRN    famotidine (PEPCID) tablet 20 mg  20 mg Oral BID    enoxaparin (LOVENOX) injection 40 mg  40 mg SubCUTAneous DAILY    losartan (COZAAR) tablet 25 mg  25 mg Oral DAILY    nitroglycerin (NITROSTAT) tablet 0.4 mg  0.4 mg SubLINGual PRN         Prior to Admission medications    Medication Sig Start Date End Date Taking? Authorizing Provider   gabapentin (NEURONTIN) 800 mg tablet Take 800 mg by mouth three (3) times daily. Yes Provider, Historical   divalproex ER (Depakote ER) 250 mg ER tablet Take 500 mg by mouth two (2) times a day. Yes Provider, Historical   losartan (COZAAR) 25 mg tablet Take 1 Tablet by mouth daily. 7/24/21  Yes Jennie Saeed MD   alendronate (FOSAMAX) 70 mg tablet Take 1 Tablet by mouth every seven (7) days. 6/22/21  Yes aPmela Alicia MD   carvediloL (COREG) 3.125 mg tablet Take 3.125 mg by mouth two (2) times daily (with meals). Yes Provider, Historical   aspirin 81 mg chewable tablet Take 1 Tab by mouth daily. 4/17/21  Yes Anh Hermosillo MD   teriflunomide (Aubagio) 7 mg tablet Take 7 mg by mouth every evening. Yes Other, MD Jhonny   baclofen (LIORESAL) 10 mg tablet Take 10 mg by mouth three (3) times daily. 3 in the morning; 3 at noon, 3 in the evening   Yes Rianna, MD Jhonny   spironolactone (ALDACTONE) 50 mg tablet Take  by mouth daily. One at noon and 2 in the evening   Yes Rianna, MD Jhonny   fluconazole (DIFLUCAN) 150 mg tablet Take 1 Tablet by mouth daily for 1 day. FDA advises cautious prescribing of oral fluconazole in pregnancy. 7/21/21 7/22/21  Pamela Alicia MD   clotrimazole-betamethasone (LOTRISONE) topical cream Apply a small amount and rub into the external skin of the vagina twice a day 7/13/21   Pamela Alicia MD   fluconazole (DIFLUCAN) 150 mg tablet Take 1 tablet now and repeat in 4 days 6/30/21   Pamela Alicia MD   clobetasoL (TEMOVATE) 0.05 % topical cream Apply  to affected area two (2) times a day. 6/15/21   Pamela Alicia MD   B.ani/L.aci/L.rafael/L.plan/L. Mango (PROBIOTIC FORMULA PO) Take  by mouth.     Provider, Kyle Ren MD

## 2021-07-23 NOTE — DISCHARGE SUMMARY
HOSPITALIST DISCHARGE SUMMARY    NAME: Shannan Simms   :  1965   MRN:  [id number]     Date/Time:  2021 10:36 AM    DISCHARGE DIAGNOSIS:  Principal Problem:    Chest pain (2021)    Active Problems:    Myron Aspen syndrome (2021)      Eyad-Danlos syndrome (2021)      Multiple sclerosis ([facility] (2021)        Admission Diagnosis:  Chest pain    CONSULTATIONS: Cardiology    Procedures: see electronic medical records for all procedures/Xrays and details which were not copied into this note but were reviewed prior to creation of Plan. Please follow-up tests/labs that are still pendin. None     DISCHARGE SUMMARY/HOSPITAL COURSE: for full details see H&P, daily progress notes, labs, consult notes. Briefly As Per HPI:  Shannan Simms is a 61 y.o. female who history of Takotsubo disease, Ehler Danlos syndrome, multiple sclerosis, bladder outlet obstruction and recurrent UTI came to the hospital with a complaint of chest pain. Patient states that patient has a heavy feeling in the middle of the chest since last night. The feeling is somewhat eased off now. She states that she took ibuprofen. Patient denies any nausea vomiting or shortness of breath as such. Patient's cardiac enzymes have been negative. Hospital service was requested to admit the patient for further work-up and management. Patient's cardiac enzymes are negative. Patient was evaluated by cardiology. Echocardiogram has been done. Interpretation Summary    Result status: Final result   · LV: Estimated LVEF is >70%. Normal cavity size, wall thickness, systolic function (ejection fraction normal) and diastolic function. Wall motion: normal.  · MV: Mild mitral valve regurgitation is present. · TV: Mild tricuspid valve regurgitation is present.   · IAS: No atrial septal defect present.       _______________________________________________________________________   Patient seen and examined by me on day of discharge. Pertinent findings are:  Vitals:  Visit Vitals  /61 (BP 1 Location: Left upper arm, BP Patient Position: At rest;Supine)   Pulse 82   Temp 98 °F (36.7 °C)   Resp 20   Ht 5' 4\" (1.626 m)   Wt 52.6 kg (116 lb)   LMP  (LMP Unknown)   SpO2 99%   Breastfeeding No   BMI 19.91 kg/m²     Temp (24hrs), Av.9 °F (36.6 °C), Min:97.7 °F (36.5 °C), Max:98 °F (36.7 °C)      Last 24hr Input/Output:  No intake or output data in the 24 hours ending 21 1036     PHYSICAL EXAM:   General: Alert and awake  Skin: Extremities and face reveal no rashes. No bartlett erythema. No telangiectasias on the chest wall. HEENT: Sclerae anicteric. Extra-occular muscles are intact. No oral ulcers. No ENT discharge. The neck is supple. Cardiovascular: Regular rate and rhythm. No murmurs, gallops, or rubs. PMI nondisplaced. Carotids without bruits. Respiratory: Comfortable breathing with no accessory muscle use. Clear breath sounds with no wheezes, rales, or rhonchi. GI: Abdomen nondistended, soft, and nontender. Normal active bowel sounds. No enlargement of the liver or spleen. No masses palpable. Rectal: Deferred   Musculoskeletal: No pitting edema of the lower legs. Extremities have good range of motion. No costovertebral tenderness. Psychiatric: Mood appears appropriate with judgement intact. Lymphatic: No cervical or supraclavicular adenopathy. See Discharge Instructions for further details. _______________________________________________________________________  DISPOSITION:    Home with Family: x   Home with HH/PT/OT/RN:    SNF/LTC:    RICARDO:    OTHER:    ________________________________________________________________________  Medications Reviewed:  Current Discharge Medication List      START taking these medications    Details   losartan (COZAAR) 25 mg tablet Take 1 Tablet by mouth daily.   Qty: 30 Tablet, Refills: 0  Start date: 2021         CONTINUE these medications which have NOT CHANGED    Details   gabapentin (NEURONTIN) 800 mg tablet Take 800 mg by mouth three (3) times daily. divalproex ER (Depakote ER) 250 mg ER tablet Take 500 mg by mouth two (2) times a day. alendronate (FOSAMAX) 70 mg tablet Take 1 Tablet by mouth every seven (7) days. Qty: 12 Tablet, Refills: 3    Associated Diagnoses: Osteopenia, unspecified location      carvediloL (COREG) 3.125 mg tablet Take 3.125 mg by mouth two (2) times daily (with meals). aspirin 81 mg chewable tablet Take 1 Tab by mouth daily. Qty: 30 Tab, Refills: 0      teriflunomide (Aubagio) 7 mg tablet Take 7 mg by mouth every evening. baclofen (LIORESAL) 10 mg tablet Take 10 mg by mouth three (3) times daily. 3 in the morning; 3 at noon, 3 in the evening      spironolactone (ALDACTONE) 50 mg tablet Take  by mouth daily. One at noon and 2 in the evening      clotrimazole-betamethasone (LOTRISONE) topical cream Apply a small amount and rub into the external skin of the vagina twice a day  Qty: 15 g, Refills: 1    Associated Diagnoses: Chronic vulvitis; Vaginal irritation      fluconazole (DIFLUCAN) 150 mg tablet Take 1 tablet now and repeat in 4 days  Qty: 2 Tablet, Refills: 0    Associated Diagnoses: Vaginal irritation      clobetasoL (TEMOVATE) 0.05 % topical cream Apply  to affected area two (2) times a day. Qty: 15 g, Refills: 0    Associated Diagnoses: Vaginitis and vulvovaginitis      B.ani/L.aci/L.sal/L.plan/L. Mango (PROBIOTIC FORMULA PO) Take  by mouth. STOP taking these medications       divalproex DR (Depakote) 250 mg tablet Comments:   Reason for Stopping:         gabapentin (NEURONTIN) 400 mg capsule Comments:   Reason for Stopping:               PMH/SH reviewed - no change compared to H&P  ________________________________________________________________________    Recommended diet:  DIET ADULT    Recommended activity:Activity as tolerated       Follow Up:   Follow-up Information     Follow up With Specialties Details Why Contact Stasia Sever, MD Family Medicine In 1 week  [address] [facility] Cristinandi Davidson MD Cardiology In 2 weeks  [place] [address]  [phone]             ______________________________________________________________________  Total time spent in discharge (min): >35 min   ________________________________________________________________________    Care Plan discussed with:    Comments   Patient x    Family      RN x    Care Manager x                   Consultant:      ________________________________________________________________________  Attending Physician:  Regina Escobar MD  ________________________________________________________________________  **Lab Data Reviewed:  Recent Results (from the past 24 hour(s))   TROPONIN I    Collection Time: 07/22/21 12:37 PM   Result Value Ref Range    Troponin-I, Qt. <0.05 <0.05 ng/mL   TROPONIN I    Collection Time: 07/22/21  8:26 PM   Result Value Ref Range    Troponin-I, Qt. <0.05 <6.05 ng/mL   METABOLIC PANEL, BASIC    Collection Time: 07/23/21  7:00 AM   Result Value Ref Range    Sodium 136 136 - 145 mmol/L    Potassium 4.1 3.5 - 5.1 mmol/L    Chloride 99 97 - 108 mmol/L    CO2 33 (H) 21 - 32 mmol/L    Anion gap 4 (L) 5 - 15 mmol/L    Glucose 87 65 - 100 mg/dL    BUN 14 6 - 20 mg/dL    Creatinine 0.70 0.55 - 1.02 mg/dL    BUN/Creatinine ratio 20 12 - 20      GFR est AA >60 >60 ml/min/1.73m2    GFR est non-AA >60 >60 ml/min/1.73m2    Calcium 9.4 8.5 - 10.1 mg/dL   CBC WITH AUTOMATED DIFF    Collection Time: 07/23/21  7:00 AM   Result Value Ref Range    WBC 6.1 3.6 - 11.0 K/uL    RBC 5.19 3.80 - 5.20 M/uL    HGB 16.2 (H) 11.5 - 16.0 g/dL    HCT 47.6 (H) 35.0 - 47.0 %    MCV 91.7 80.0 - 99.0 FL    MCH 31.2 26.0 - 34.0 PG    MCHC 34.0 30.0 - 36.5 g/dL    RDW 13.7 11.5 - 14.5 %    PLATELET 249 251 - 814 K/uL    MPV 12.0 8.9 - 12.9 FL    NRBC 0.0 0.0  WBC    ABSOLUTE NRBC 0.00 0.00 - 0.01 K/uL    NEUTROPHILS 57 32 - 75 %    LYMPHOCYTES 24 12 - 49 %    MONOCYTES 14 (H) 5 - 13 %    EOSINOPHILS 3 0 - 7 %    BASOPHILS 1 0 - 1 %    IMMATURE GRANULOCYTES 1 (H) 0 - 0.5 %    ABS. NEUTROPHILS 3.5 1.8 - 8.0 K/UL    ABS. LYMPHOCYTES 1.5 0.8 - 3.5 K/UL    ABS. MONOCYTES 0.9 0.0 - 1.0 K/UL    ABS. EOSINOPHILS 0.2 0.0 - 0.4 K/UL    ABS. BASOPHILS 0.0 0.0 - 0.1 K/UL    ABS. IMM. GRANS. 0.0 0.00 - 0.04 K/UL    DF AUTOMATED       XR CHEST PORT   Final Result   No findings of acute cardiopulmonary abnormality.         [unfilled]

## 2021-07-26 NOTE — PROGRESS NOTES
Tal Sigala is a 61 y.o. female, , No LMP recorded (lmp unknown). Patient is postmenopausal., who presents today for the following:  Chief Complaint   Patient presents with    Follow-up     Pt here to f/u from vaginal problem. Allergies   Allergen Reactions    Ciprofloxacin Rash    Fentanyl Rash    Nitrofurantoin Rash    Nortriptyline Vertigo    Prozac [Fluoxetine] Rash    Sulfa (Sulfonamide Antibiotics) Rash    Wellbutrin [Bupropion] Rash    Zofran [Ondansetron Hcl] Other (comments)     constipation    Diprivan [Propofol] Hives and Shortness of Breath       Current Outpatient Medications   Medication Sig    gabapentin (NEURONTIN) 800 mg tablet Take 800 mg by mouth three (3) times daily.  divalproex ER (Depakote ER) 250 mg ER tablet Take 500 mg by mouth two (2) times a day.  losartan (COZAAR) 25 mg tablet Take 1 Tablet by mouth daily.  clotrimazole-betamethasone (LOTRISONE) topical cream Apply a small amount and rub into the external skin of the vagina twice a day    fluconazole (DIFLUCAN) 150 mg tablet Take 1 tablet now and repeat in 4 days    alendronate (FOSAMAX) 70 mg tablet Take 1 Tablet by mouth every seven (7) days.  carvediloL (COREG) 3.125 mg tablet Take 3.125 mg by mouth two (2) times daily (with meals).  clobetasoL (TEMOVATE) 0.05 % topical cream Apply  to affected area two (2) times a day.  aspirin 81 mg chewable tablet Take 1 Tab by mouth daily.  B.ani/L.aci/L.sal/L.plan/L. Mango (PROBIOTIC FORMULA PO) Take  by mouth.  teriflunomide (Aubagio) 7 mg tablet Take 7 mg by mouth every evening.  baclofen (LIORESAL) 10 mg tablet Take 10 mg by mouth three (3) times daily. 3 in the morning; 3 at noon, 3 in the evening    spironolactone (ALDACTONE) 50 mg tablet Take  by mouth daily. One at noon and 2 in the evening     No current facility-administered medications for this visit.        Past Medical History:   Diagnosis Date    Arthritis     patient states not aware of this dx    Bladder outlet obstruction     Eyad-Danlos syndrome     GERD (gastroesophageal reflux disease)     Incomplete bladder emptying     MS (multiple sclerosis) (HCC)     MVP (mitral valve prolapse)     patient states told years ago, doesnt see cardiology    Recurrent UTI     UTI (urinary tract infection)        Past Surgical History:   Procedure Laterality Date    HX CYSTOSTOMY      HX UROLOGICAL  04/14/2021    CYSTOURETHROSCOPY    HX UROLOGICAL  04/14/2021    BILATERAL  RETROGRADE PYELOGRAMS    HX UROLOGICAL  04/14/2021    URETHRAL DILATION    HX WISDOM TEETH EXTRACTION         Family History   Problem Relation Age of Onset    Hypertension Mother     Elevated Lipids Mother     Cancer Father         PROSTATE    Elevated Lipids Father        Social History     Socioeconomic History    Marital status:      Spouse name: Not on file    Number of children: Not on file    Years of education: Not on file    Highest education level: Not on file   Occupational History    Not on file   Tobacco Use    Smoking status: Never Smoker    Smokeless tobacco: Never Used   Vaping Use    Vaping Use: Never used   Substance and Sexual Activity    Alcohol use: Not Currently    Drug use: Never    Sexual activity: Yes     Partners: Male   Other Topics Concern    Not on file   Social History Narrative    Not on file     Social Determinants of Health     Financial Resource Strain:     Difficulty of Paying Living Expenses:    Food Insecurity:     Worried About Running Out of Food in the Last Year:     Ran Out of Food in the Last Year:    Transportation Needs:     Lack of Transportation (Medical):      Lack of Transportation (Non-Medical):    Physical Activity:     Days of Exercise per Week:     Minutes of Exercise per Session:    Stress:     Feeling of Stress :    Social Connections:     Frequency of Communication with Friends and Family:     Frequency of Social Gatherings with Friends and Family:     Attends Church Services:     Active Member of Clubs or Organizations:     Attends Club or Organization Meetings:     Marital Status:    Intimate Partner Violence:     Fear of Current or Ex-Partner:     Emotionally Abused:     Physically Abused:     Sexually Abused:          HPI  Patient presents for follow up  Patient s/p biopsy of vulvar secondary to chronic vulvitis  Patient reports \"black spot\" at biopsy site  No pain  No abnormal bleeding    Patholgy pending      Review of Systems   Constitutional: Negative. Respiratory: Negative. Cardiovascular: Negative. Gastrointestinal: Negative. Genitourinary: Negative. Musculoskeletal: Negative. Skin: Negative. Neurological: Negative. Endo/Heme/Allergies: Negative. Psychiatric/Behavioral: Negative. All other systems reviewed and are negative. BP (!) 141/75 (BP 1 Location: Right arm, BP Patient Position: Sitting)   Pulse 75   Resp 16   Ht 5' 4\" (1.626 m)   Wt 118 lb (53.5 kg)   LMP  (LMP Unknown)   SpO2 98%   BMI 20.25 kg/m²    OBGyn Exam   PE:  Constitutional: General Appearance: healthy-appearing, well-nourished, well-developed, and well groomed. Psychiatric: Orientation: to time, place, and person. Mood and Affect: normal mood and affect and appropriate and active and alert. Abdomen: Auscultation/Inspection/Palpation: tenderness and mass and non-distended. Hernia: none palpated. Female Genitalia: Vulva: no masses, atrophy, or lesions. Healed scab along biopsy site. Erythema of vulvar    Bladder/Urethra: no urethral discharge or mass and normal meatus and bladder non distended. Vagina no tenderness, erythema, cystocele, rectocele, abnormal vaginal discharge, or vesicle(s) or ulcers.           1. Vaginitis and vulvovaginitis

## 2021-07-27 ENCOUNTER — TELEPHONE (OUTPATIENT)
Dept: OBGYN CLINIC | Age: 56
End: 2021-07-27

## 2021-07-27 NOTE — TELEPHONE ENCOUNTER
----- Message from Carl Villegas MD sent at 7/26/2021  7:22 PM EDT -----  Please call the patient regarding her result.   Lichen sclerosis  Please continue clobestol

## 2021-07-27 NOTE — TELEPHONE ENCOUNTER
----- Message from Deisi Doe MD sent at 7/26/2021  7:22 PM EDT -----  Please call the patient regarding her result.   Lichen sclerosis  Please continue clobestol

## 2021-07-27 NOTE — TELEPHONE ENCOUNTER
----- Message from Judi Wright MD sent at 7/26/2021  7:22 PM EDT -----  Please call the patient regarding her result.   Lichen sclerosis  Please continue clobestol

## 2021-10-15 DIAGNOSIS — N89.8 VAGINAL IRRITATION: ICD-10-CM

## 2021-10-15 RX ORDER — FLUCONAZOLE 150 MG/1
TABLET ORAL
Qty: 2 TABLET | Refills: 0 | Status: SHIPPED | OUTPATIENT
Start: 2021-10-15 | End: 2021-11-03 | Stop reason: SDUPTHER

## 2021-10-25 ENCOUNTER — TELEPHONE (OUTPATIENT)
Dept: OBGYN CLINIC | Age: 56
End: 2021-10-25

## 2021-10-25 NOTE — TELEPHONE ENCOUNTER
Returned the patient's call and she states she thought she had a yeast infection and was prescribed Diflucan on 10/15/21. States she is still burning and red. Patient states she stopped the Temovate while taking the Diflucan. Advised her it may be the Lichen Sclerolis that has flared up due to her stopping it until she finished the Diflucan. States she restarted the Temovate yesterday. She will call back to schedule an appt with Dr Elena Kee.

## 2021-10-26 ENCOUNTER — OFFICE VISIT (OUTPATIENT)
Dept: OBGYN CLINIC | Age: 56
End: 2021-10-26
Payer: MEDICARE

## 2021-10-26 VITALS
DIASTOLIC BLOOD PRESSURE: 66 MMHG | SYSTOLIC BLOOD PRESSURE: 125 MMHG | RESPIRATION RATE: 16 BRPM | HEART RATE: 77 BPM | WEIGHT: 115 LBS | BODY MASS INDEX: 19.63 KG/M2 | HEIGHT: 64 IN | OXYGEN SATURATION: 96 %

## 2021-10-26 DIAGNOSIS — N76.0 VAGINITIS AND VULVOVAGINITIS: Primary | ICD-10-CM

## 2021-10-26 PROCEDURE — 99213 OFFICE O/P EST LOW 20 MIN: CPT | Performed by: OBSTETRICS & GYNECOLOGY

## 2021-10-26 PROCEDURE — G8427 DOCREV CUR MEDS BY ELIG CLIN: HCPCS | Performed by: OBSTETRICS & GYNECOLOGY

## 2021-10-26 PROCEDURE — G8420 CALC BMI NORM PARAMETERS: HCPCS | Performed by: OBSTETRICS & GYNECOLOGY

## 2021-10-26 PROCEDURE — G8510 SCR DEP NEG, NO PLAN REQD: HCPCS | Performed by: OBSTETRICS & GYNECOLOGY

## 2021-10-26 PROCEDURE — 3017F COLORECTAL CA SCREEN DOC REV: CPT | Performed by: OBSTETRICS & GYNECOLOGY

## 2021-10-26 PROCEDURE — G9899 SCRN MAM PERF RSLTS DOC: HCPCS | Performed by: OBSTETRICS & GYNECOLOGY

## 2021-10-26 RX ORDER — TERCONAZOLE 4 MG/G
1 CREAM VAGINAL
Qty: 45 G | Refills: 0 | Status: SHIPPED | OUTPATIENT
Start: 2021-10-26 | End: 2022-02-12

## 2021-10-26 NOTE — PATIENT INSTRUCTIONS
Vaginal Yeast Infection: Care Instructions  Overview     A vaginal yeast infection is the growth of too many yeast cells in the vagina. This is common in women of all ages. Itching, vaginal discharge and irritation, and other symptoms can bother you. But yeast infections don't often cause other health problems. Some medicines can increase your risk of getting a yeast infection. These include antibiotics, birth control pills, hormones, and steroids. You may also be more likely to get a yeast infection if you are pregnant, have diabetes, douche, or wear tight clothes. With treatment, most yeast infections get better in 2 to 3 days. Follow-up care is a key part of your treatment and safety. Be sure to make and go to all appointments, and call your doctor if you are having problems. It's also a good idea to know your test results and keep a list of the medicines you take. How can you care for yourself at home? · Take your medicines exactly as prescribed. Call your doctor if you think you are having a problem with your medicine. · Ask your doctor about over-the-counter (OTC) medicines for yeast infections. They may cost less than prescription medicines. If you use an OTC treatment, read and follow all instructions on the label. · Don't use tampons while using a vaginal cream or suppository. The tampons can absorb the medicine. Use pads instead. · Wear loose cotton clothing. Don't wear nylon or other fabric that holds body heat and moisture close to the skin. · Try sleeping without underwear. · Don't scratch. Relieve itching with a cold pack or a cool bath. · Don't wash your vaginal area more than once a day. Use plain water or a mild, unscented soap. Air-dry the vaginal area. · Change out of wet swimsuits after swimming. · If you are using a vaginal medicine, don't have sex until you have finished your treatment. But if you do have sex, don't depend on a condom or diaphragm for birth control.  The oil in some vaginal medicines weakens latex. · Don't douche. When should you call for help? Call your doctor now or seek immediate medical care if:    · You have unexpected vaginal bleeding.     · You have new or increased pain in your vagina or pelvis. Watch closely for changes in your health, and be sure to contact your doctor if:    · You have a fever.     · You are not getting better after 2 days.     · Your symptoms come back after you finish your medicines. Where can you learn more? Go to http://www.gray.com/  Enter B372 in the search box to learn more about \"Vaginal Yeast Infection: Care Instructions. \"  Current as of: February 11, 2021               Content Version: 13.0  © 2006-2021 [employer]. Care instructions adapted under license by [employer] (which disclaims liability or warranty for this information). If you have questions about a medical condition or this instruction, always ask your healthcare professional. [address] any warranty or liability for your use of this information. Vaginal Yeast Infection: Care Instructions  Overview     A vaginal yeast infection is the growth of too many yeast cells in the vagina. This is common in women of all ages. Itching, vaginal discharge and irritation, and other symptoms can bother you. But yeast infections don't often cause other health problems. Some medicines can increase your risk of getting a yeast infection. These include antibiotics, birth control pills, hormones, and steroids. You may also be more likely to get a yeast infection if you are pregnant, have diabetes, douche, or wear tight clothes. With treatment, most yeast infections get better in 2 to 3 days. Follow-up care is a key part of your treatment and safety. Be sure to make and go to all appointments, and call your doctor if you are having problems.  It's also a good idea to know your test results and keep a list of the medicines you take. How can you care for yourself at home? · Take your medicines exactly as prescribed. Call your doctor if you think you are having a problem with your medicine. · Ask your doctor about over-the-counter (OTC) medicines for yeast infections. They may cost less than prescription medicines. If you use an OTC treatment, read and follow all instructions on the label. · Don't use tampons while using a vaginal cream or suppository. The tampons can absorb the medicine. Use pads instead. · Wear loose cotton clothing. Don't wear nylon or other fabric that holds body heat and moisture close to the skin. · Try sleeping without underwear. · Don't scratch. Relieve itching with a cold pack or a cool bath. · Don't wash your vaginal area more than once a day. Use plain water or a mild, unscented soap. Air-dry the vaginal area. · Change out of wet swimsuits after swimming. · If you are using a vaginal medicine, don't have sex until you have finished your treatment. But if you do have sex, don't depend on a condom or diaphragm for birth control. The oil in some vaginal medicines weakens latex. · Don't douche. When should you call for help? Call your doctor now or seek immediate medical care if:    · You have unexpected vaginal bleeding.     · You have new or increased pain in your vagina or pelvis. Watch closely for changes in your health, and be sure to contact your doctor if:    · You have a fever.     · You are not getting better after 2 days.     · Your symptoms come back after you finish your medicines. Where can you learn more? Go to http://www.gray.com/  Enter W153 in the search box to learn more about \"Vaginal Yeast Infection: Care Instructions. \"  Current as of: February 11, 2021               Content Version: 13.0  © 8327-0703 [employer], [employer].    Care instructions adapted under license by [employer] (which disclaims liability or warranty for this information). If you have questions about a medical condition or this instruction, always ask your healthcare professional. [place] [address] any warranty or liability for your use of this information.

## 2021-10-26 NOTE — PROGRESS NOTES
Casandra Alfaro is a 61 y.o. female, , No LMP recorded (lmp unknown). Patient is postmenopausal., who presents today for the following:  Chief Complaint   Patient presents with    Check Up     Pt c/o vaginal issues. Allergies   Allergen Reactions    Ciprofloxacin Rash    Fentanyl Rash    Nitrofurantoin Rash    Nortriptyline Vertigo    Prozac [Fluoxetine] Rash    Sulfa (Sulfonamide Antibiotics) Rash    Wellbutrin [Bupropion] Rash    Zofran [Ondansetron Hcl] Other (comments)     constipation    Diprivan [Propofol] Hives and Shortness of Breath       Current Outpatient Medications   Medication Sig    terconazole (TERAZOL 7) 0.4 % vaginal cream Insert 1 Applicator into vagina nightly.  divalproex ER (Depakote ER) 250 mg ER tablet Take 500 mg by mouth two (2) times a day.  losartan (COZAAR) 25 mg tablet Take 1 Tablet by mouth daily.  clotrimazole-betamethasone (LOTRISONE) topical cream Apply a small amount and rub into the external skin of the vagina twice a day    alendronate (FOSAMAX) 70 mg tablet Take 1 Tablet by mouth every seven (7) days.  carvediloL (COREG) 3.125 mg tablet Take 3.125 mg by mouth two (2) times daily (with meals).  clobetasoL (TEMOVATE) 0.05 % topical cream Apply  to affected area two (2) times a day.  aspirin 81 mg chewable tablet Take 1 Tab by mouth daily.  B.ani/L.aci/L.sal/L.plan/L. Mango (PROBIOTIC FORMULA PO) Take  by mouth.  teriflunomide (Aubagio) 7 mg tablet Take 7 mg by mouth every evening.  baclofen (LIORESAL) 10 mg tablet Take 10 mg by mouth three (3) times daily. 3 in the morning; 3 at noon, 3 in the evening    spironolactone (ALDACTONE) 50 mg tablet Take  by mouth daily. One at noon and 2 in the evening    fluconazole (DIFLUCAN) 150 mg tablet Take 1 tablet now and repeat in 4 days (Patient not taking: Reported on 10/26/2021)    gabapentin (NEURONTIN) 800 mg tablet Take 800 mg by mouth three (3) times daily.      No current facility-administered medications for this visit. Past Medical History:   Diagnosis Date    Arthritis     patient states not aware of this dx    Bladder outlet obstruction     Eyad-Danlos syndrome     GERD (gastroesophageal reflux disease)     Incomplete bladder emptying     MS (multiple sclerosis) (HCC)     MVP (mitral valve prolapse)     patient states told years ago, doesnt see cardiology    Recurrent UTI     UTI (urinary tract infection)        Past Surgical History:   Procedure Laterality Date    HX CYSTOSTOMY      HX UROLOGICAL  04/14/2021    CYSTOURETHROSCOPY    HX UROLOGICAL  04/14/2021    BILATERAL  RETROGRADE PYELOGRAMS    HX UROLOGICAL  04/14/2021    URETHRAL DILATION    HX WISDOM TEETH EXTRACTION         Family History   Problem Relation Age of Onset    Hypertension Mother     Elevated Lipids Mother     Cancer Father         PROSTATE    Elevated Lipids Father        Social History     Socioeconomic History    Marital status:      Spouse name: Not on file    Number of children: Not on file    Years of education: Not on file    Highest education level: Not on file   Occupational History    Not on file   Tobacco Use    Smoking status: Never Smoker    Smokeless tobacco: Never Used   Vaping Use    Vaping Use: Never used   Substance and Sexual Activity    Alcohol use: Not Currently    Drug use: Never    Sexual activity: Yes     Partners: Male   Other Topics Concern    Not on file   Social History Narrative    Not on file     Social Determinants of Health     Financial Resource Strain:     Difficulty of Paying Living Expenses:    Food Insecurity:     Worried About Running Out of Food in the Last Year:     Ran Out of Food in the Last Year:    Transportation Needs:     Lack of Transportation (Medical):      Lack of Transportation (Non-Medical):    Physical Activity:     Days of Exercise per Week:     Minutes of Exercise per Session:    Stress:     Feeling of Stress :    Social Connections:     Frequency of Communication with Friends and Family:     Frequency of Social Gatherings with Friends and Family:     Attends Evangelical Services:     Active Member of Clubs or Organizations:     Attends Club or Organization Meetings:     Marital Status:    Intimate Partner Violence:     Fear of Current or Ex-Partner:     Emotionally Abused:     Physically Abused:     Sexually Abused:          HPI Vaginal Discharge   Reported by patient. Location: vagina   Quality: irritation   Severity: moderate   Duration: symptoms lasting over 2 weeks   Context: history of recurrent vaginal infections / lichens  Associated Symptoms: no vaginal burning; no swelling/redness; no fever/chills; no diarrhea; no abdominal pain; no pelvic pain; no vaginal pain; no pain during urination; no pain during intercourse; no vaginal lump; no genital lesion; no sexually transmitted disease; no fever; vaginal itching       Review of Systems   Constitutional: Negative. Respiratory: Negative. Cardiovascular: Negative. Gastrointestinal: Negative. Genitourinary: Negative. Musculoskeletal: Negative. Skin: Negative. Neurological: Negative. Endo/Heme/Allergies: Negative. Psychiatric/Behavioral: Negative. All other systems reviewed and are negative. /66 (BP 1 Location: Right arm, BP Patient Position: Sitting)   Pulse 77   Resp 16   Ht 5' 4\" (1.626 m)   Wt 115 lb (52.2 kg)   LMP  (LMP Unknown)   SpO2 96%   BMI 19.74 kg/m²    OBGyn Exam   PE:  Constitutional: General Appearance: healthy-appearing, well-nourished, well-developed, and well groomed. Psychiatric: Orientation: to time, place, and person. Mood and Affect: normal mood and affect and appropriate and active and alert. Abdomen: Auscultation/Inspection/Palpation: tenderness and mass and non-distended. Hernia: none palpated.      Female Genitalia: Vulva: no masses, atrophy, or lesions. Bladder/Urethra: no urethral discharge or mass and normal meatus and bladder non distended. Vagina no tenderness, marked erythema, scant discharge     Cervix: no discharge or cervical motion tenderness and grossly normal.     Uterus: mobile, non-tender, and no uterine prolapse. 1. Vaginitis and vulvovaginitis    - terconazole (TERAZOL 7) 0.4 % vaginal cream; Insert 1 Applicator into vagina nightly.   Dispense: 45 g; Refill: 0  - NUSWAB VAGINITIS PLUS (VG+) WITH CANDIDA (SIX SPECIES)

## 2021-10-31 LAB
A VAGINAE DNA VAG QL NAA+PROBE: NORMAL SCORE
BVAB2 DNA VAG QL NAA+PROBE: NORMAL SCORE
C ALBICANS DNA VAG QL NAA+PROBE: NEGATIVE
C GLABRATA DNA VAG QL NAA+PROBE: NEGATIVE
C KRUSEI DNA VAG QL NAA+PROBE: NEGATIVE
C LUSITANIAE DNA VAG QL NAA+PROBE: NEGATIVE
C TRACH DNA VAG QL NAA+PROBE: NEGATIVE
CANDIDA DNA VAG QL NAA+PROBE: NEGATIVE
MEGA1 DNA VAG QL NAA+PROBE: NORMAL SCORE
N GONORRHOEA DNA VAG QL NAA+PROBE: NEGATIVE
T VAGINALIS DNA VAG QL NAA+PROBE: NEGATIVE

## 2021-11-03 ENCOUNTER — TELEPHONE (OUTPATIENT)
Dept: OBGYN CLINIC | Age: 56
End: 2021-11-03

## 2021-11-03 DIAGNOSIS — N89.8 VAGINAL IRRITATION: ICD-10-CM

## 2021-11-03 RX ORDER — FLUCONAZOLE 150 MG/1
TABLET ORAL
Qty: 2 TABLET | Refills: 0 | Status: SHIPPED | OUTPATIENT
Start: 2021-11-03 | End: 2022-02-12

## 2021-11-03 NOTE — TELEPHONE ENCOUNTER
Returned the patient's call and she states she used the Terconazole for 4 days and it became painful for her to insert the applicator so she stopped. She states she also stopped her Clobetasol. Advised her to restart her Clobetasol and a prescription for Diflucan submitted to her pharmacy. She was also advised she may try the Lotrisone externally if the Diflucan doesn't help.

## 2021-11-07 ENCOUNTER — HOSPITAL ENCOUNTER (EMERGENCY)
Age: 56
Discharge: HOME | End: 2021-11-07
Attending: EMERGENCY MEDICINE
Payer: MEDICARE

## 2021-11-07 ENCOUNTER — APPOINTMENT (OUTPATIENT)
Dept: GENERAL RADIOLOGY | Age: 56
End: 2021-11-07
Attending: EMERGENCY MEDICINE
Payer: MEDICARE

## 2021-11-07 VITALS
HEIGHT: 64 IN | RESPIRATION RATE: 18 BRPM | HEART RATE: 78 BPM | BODY MASS INDEX: 19.63 KG/M2 | TEMPERATURE: 98.4 F | SYSTOLIC BLOOD PRESSURE: 136 MMHG | DIASTOLIC BLOOD PRESSURE: 70 MMHG | WEIGHT: 115 LBS | OXYGEN SATURATION: 100 %

## 2021-11-07 DIAGNOSIS — R07.9 ACUTE CHEST PAIN: Primary | ICD-10-CM

## 2021-11-07 LAB
ANION GAP SERPL CALC-SCNC: 8 MMOL/L (ref 5–15)
BASOPHILS # BLD: 0 K/UL (ref 0–0.1)
BASOPHILS NFR BLD: 0 % (ref 0–1)
BUN SERPL-MCNC: 20 MG/DL (ref 6–20)
BUN/CREAT SERPL: 26 (ref 12–20)
CA-I BLD-MCNC: 9.8 MG/DL (ref 8.5–10.1)
CHLORIDE SERPL-SCNC: 97 MMOL/L (ref 97–108)
CO2 SERPL-SCNC: 33 MMOL/L (ref 21–32)
CREAT SERPL-MCNC: 0.78 MG/DL (ref 0.55–1.02)
DIFFERENTIAL METHOD BLD: ABNORMAL
EOSINOPHIL # BLD: 0.1 K/UL (ref 0–0.4)
EOSINOPHIL NFR BLD: 2 % (ref 0–7)
ERYTHROCYTE [DISTWIDTH] IN BLOOD BY AUTOMATED COUNT: 14.6 % (ref 11.5–14.5)
GLUCOSE SERPL-MCNC: 100 MG/DL (ref 65–100)
HCT VFR BLD AUTO: 45.4 % (ref 35–47)
HGB BLD-MCNC: 15.6 G/DL (ref 11.5–16)
IMM GRANULOCYTES # BLD AUTO: 0 K/UL (ref 0–0.04)
IMM GRANULOCYTES NFR BLD AUTO: 0 % (ref 0–0.5)
LYMPHOCYTES # BLD: 1.3 K/UL (ref 0.8–3.5)
LYMPHOCYTES NFR BLD: 18 % (ref 12–49)
MAGNESIUM SERPL-MCNC: 2.3 MG/DL (ref 1.6–2.4)
MCH RBC QN AUTO: 32.6 PG (ref 26–34)
MCHC RBC AUTO-ENTMCNC: 34.4 G/DL (ref 30–36.5)
MCV RBC AUTO: 94.8 FL (ref 80–99)
MONOCYTES # BLD: 0.8 K/UL (ref 0–1)
MONOCYTES NFR BLD: 12 % (ref 5–13)
NEUTS SEG # BLD: 4.7 K/UL (ref 1.8–8)
NEUTS SEG NFR BLD: 68 % (ref 32–75)
PLATELET # BLD AUTO: 234 K/UL (ref 150–400)
PMV BLD AUTO: 10.8 FL (ref 8.9–12.9)
POTASSIUM SERPL-SCNC: 4.3 MMOL/L (ref 3.5–5.1)
RBC # BLD AUTO: 4.79 M/UL (ref 3.8–5.2)
SODIUM SERPL-SCNC: 138 MMOL/L (ref 136–145)
TROPONIN-HIGH SENSITIVITY: 7 NG/L (ref 0–51)
WBC # BLD AUTO: 7 K/UL (ref 3.6–11)

## 2021-11-07 PROCEDURE — 85025 COMPLETE CBC W/AUTO DIFF WBC: CPT

## 2021-11-07 PROCEDURE — 83735 ASSAY OF MAGNESIUM: CPT

## 2021-11-07 PROCEDURE — 71045 X-RAY EXAM CHEST 1 VIEW: CPT

## 2021-11-07 PROCEDURE — 80048 BASIC METABOLIC PNL TOTAL CA: CPT

## 2021-11-07 PROCEDURE — 96375 TX/PRO/DX INJ NEW DRUG ADDON: CPT

## 2021-11-07 PROCEDURE — 74011000250 HC RX REV CODE- 250: Performed by: EMERGENCY MEDICINE

## 2021-11-07 PROCEDURE — 96374 THER/PROPH/DIAG INJ IV PUSH: CPT

## 2021-11-07 PROCEDURE — 99284 EMERGENCY DEPT VISIT MOD MDM: CPT

## 2021-11-07 PROCEDURE — 93005 ELECTROCARDIOGRAM TRACING: CPT

## 2021-11-07 PROCEDURE — 84484 ASSAY OF TROPONIN QUANT: CPT

## 2021-11-07 PROCEDURE — 36415 COLL VENOUS BLD VENIPUNCTURE: CPT

## 2021-11-07 PROCEDURE — 74011250636 HC RX REV CODE- 250/636: Performed by: EMERGENCY MEDICINE

## 2021-11-07 RX ORDER — KETOROLAC TROMETHAMINE 10 MG/1
10 TABLET, FILM COATED ORAL
Qty: 20 TABLET | Refills: 0 | Status: SHIPPED | OUTPATIENT
Start: 2021-11-07 | End: 2021-11-12

## 2021-11-07 RX ORDER — KETOROLAC TROMETHAMINE 30 MG/ML
15 INJECTION, SOLUTION INTRAMUSCULAR; INTRAVENOUS ONCE
Status: COMPLETED | OUTPATIENT
Start: 2021-11-07 | End: 2021-11-07

## 2021-11-07 RX ORDER — HYDROXYZINE 50 MG/1
50 TABLET, FILM COATED ORAL
Qty: 20 TABLET | Refills: 0 | Status: SHIPPED | OUTPATIENT
Start: 2021-11-07 | End: 2021-11-17

## 2021-11-07 RX ORDER — KETAMINE HYDROCHLORIDE 10 MG/ML
0.25 INJECTION, SOLUTION INTRAMUSCULAR; INTRAVENOUS
Status: COMPLETED | OUTPATIENT
Start: 2021-11-07 | End: 2021-11-07

## 2021-11-07 RX ADMIN — KETAMINE HYDROCHLORIDE 13.1 MG: 10 INJECTION INTRAMUSCULAR; INTRAVENOUS at 12:16

## 2021-11-07 RX ADMIN — KETOROLAC TROMETHAMINE 15 MG: 30 INJECTION, SOLUTION INTRAMUSCULAR at 10:50

## 2021-11-07 NOTE — Clinical Note
[address] [place] [address] 
[phone] Work/School Note Date: 11/7/2021 To Whom It May concern: 
 
Jared Lambert was seen and treated today in the emergency room by the following provider(s): 
Attending Provider: Diamond Thomas MD.   
 
Jared Lambert is excused from work/school on 11/07/21 and 11/08/21. She is medically clear to return to work/school on 11/9/2021. Sincerely, [place] MD

## 2021-11-07 NOTE — ED TRIAGE NOTES
Pt c/o chest pain that began Thursday. States she called EMS and they came and did an EKG. EKG was normal per EMS. Pain is midsternal and radiates to back.   Describes as pressure

## 2021-11-07 NOTE — ED PROVIDER NOTES
EMERGENCY DEPARTMENT HISTORY AND PHYSICAL EXAM      Date: 11/7/2021  Patient Name: Reid Armendariz    History of Presenting Illness     Chief Complaint   Patient presents with    Chest Pain       History Provided By: Patient    HPI: Reid Armendariz, 61 y.o. female with a past medical history significant Eyad-Danlos, MS presents to the ED with cc of substernal chest pain x4 days. Patient ports the pain is pressure in characteristic, radiates to her back, consistent without noted aggravating or alleviating factors. Patient denies fevers or chills, denies nausea or vomiting. Patient reports history of similar pain in the past that was diagnosed as musculoskeletal.    There are no other complaints, changes, or physical findings at this time. PCP: Marielos Terry MD    No current facility-administered medications on file prior to encounter. Current Outpatient Medications on File Prior to Encounter   Medication Sig Dispense Refill    fluconazole (DIFLUCAN) 150 mg tablet Take 1 tablet now and repeat in 4 days 2 Tablet 0    terconazole (TERAZOL 7) 0.4 % vaginal cream Insert 1 Applicator into vagina nightly. 45 g 0    gabapentin (NEURONTIN) 800 mg tablet Take 800 mg by mouth three (3) times daily.  divalproex ER (Depakote ER) 250 mg ER tablet Take 500 mg by mouth two (2) times a day.  losartan (COZAAR) 25 mg tablet Take 1 Tablet by mouth daily. 30 Tablet 0    clotrimazole-betamethasone (LOTRISONE) topical cream Apply a small amount and rub into the external skin of the vagina twice a day 15 g 1    alendronate (FOSAMAX) 70 mg tablet Take 1 Tablet by mouth every seven (7) days. 12 Tablet 3    carvediloL (COREG) 3.125 mg tablet Take 3.125 mg by mouth two (2) times daily (with meals).  clobetasoL (TEMOVATE) 0.05 % topical cream Apply  to affected area two (2) times a day. 15 g 0    aspirin 81 mg chewable tablet Take 1 Tab by mouth daily. 30 Tab 0    B.ani/L.aci/L.sal/L.plan/L. Mango (PROBIOTIC FORMULA PO) Take  by mouth.  teriflunomide (Aubagio) 7 mg tablet Take 7 mg by mouth every evening.  baclofen (LIORESAL) 10 mg tablet Take 10 mg by mouth three (3) times daily. 3 in the morning; 3 at noon, 3 in the evening      spironolactone (ALDACTONE) 50 mg tablet Take  by mouth daily. One at noon and 2 in the evening         Past History     Past Medical History:  Past Medical History:   Diagnosis Date    Arthritis     patient states not aware of this dx    Bladder outlet obstruction     Eyad-Danlos syndrome     GERD (gastroesophageal reflux disease)     Incomplete bladder emptying     MS (multiple sclerosis) (HCC)     MVP (mitral valve prolapse)     patient states told years ago, doesnt see cardiology    Recurrent UTI     UTI (urinary tract infection)        Past Surgical History:  Past Surgical History:   Procedure Laterality Date    HX CYSTOSTOMY      HX UROLOGICAL  04/14/2021    CYSTOURETHROSCOPY    HX UROLOGICAL  04/14/2021    BILATERAL  RETROGRADE PYELOGRAMS    HX UROLOGICAL  04/14/2021    URETHRAL DILATION    HX WISDOM TEETH EXTRACTION         Family History:  Family History   Problem Relation Age of Onset    Hypertension Mother     Elevated Lipids Mother     Cancer Father         PROSTATE    Elevated Lipids Father        Social History:  Social History     Tobacco Use    Smoking status: Never Smoker    Smokeless tobacco: Never Used   Vaping Use    Vaping Use: Never used   Substance Use Topics    Alcohol use: Not Currently    Drug use: Never       Allergies:   Allergies   Allergen Reactions    Ciprofloxacin Rash    Fentanyl Rash    Nitrofurantoin Rash    Nortriptyline Vertigo    Prozac [Fluoxetine] Rash    Sulfa (Sulfonamide Antibiotics) Rash    Wellbutrin [Bupropion] Rash    Zofran [Ondansetron Hcl] Other (comments)     constipation    Diprivan [Propofol] Hives and Shortness of Breath         Review of Systems   Review of Systems   Constitutional: Negative for chills and fever. HENT: Negative for sinus pressure and sinus pain. Eyes: Negative for photophobia and redness. Respiratory: Negative for shortness of breath and wheezing. Cardiovascular: Positive for chest pain. Negative for palpitations. Gastrointestinal: Negative for abdominal pain and nausea. Genitourinary: Negative for flank pain and hematuria. Musculoskeletal: Negative for arthralgias and gait problem. Skin: Negative for color change and pallor. Neurological: Negative for dizziness and weakness. Review of Systems    Physical Exam   Physical Exam  Constitutional:       General: No acute distress. Appearance: Normal appearance. Not toxic-appearing. HENT:      Head: Normocephalic and atraumatic. Nose: Nose normal.      Mouth/Throat:      Mouth: Mucous membranes are moist.   Eyes:      Extraocular Movements: Extraocular movements intact. Pupils: Pupils are equal, round, and reactive to light. Cardiovascular:      Rate and Rhythm: Normal rate. Pulses: Normal pulses. Pulmonary:      Effort: Pulmonary effort is normal.      Breath sounds: No stridor, clear to auscultation bilaterally  Abdominal:      General: Abdomen is flat. There is no distension. Musculoskeletal:         General: Normal range of motion. Cervical back: Normal range of motion and neck supple. Skin:     General: Skin is warm and dry. Capillary Refill: Capillary refill takes less than 2 seconds. Neurological:      General: No focal deficit present. Mental Status: Alert and oriented to person, place, and time.    Psychiatric:         Mood and Affect: Mood normal.         Behavior: Behavior normal.     Physical Exam    Lab and Diagnostic Study Results     Labs -     Recent Results (from the past 12 hour(s))   METABOLIC PANEL, BASIC    Collection Time: 11/07/21 10:45 AM   Result Value Ref Range    Sodium 138 136 - 145 mmol/L    Potassium 4.3 3.5 - 5.1 mmol/L    Chloride 97 97 - 108 mmol/L    CO2 33 (H) 21 - 32 mmol/L    Anion gap 8 5 - 15 mmol/L    Glucose 100 65 - 100 mg/dL    BUN 20 6 - 20 mg/dL    Creatinine 0.78 0.55 - 1.02 mg/dL    BUN/Creatinine ratio 26 (H) 12 - 20      GFR est AA >60 >60 ml/min/1.73m2    GFR est non-AA >60 >60 ml/min/1.73m2    Calcium 9.8 8.5 - 10.1 mg/dL   MAGNESIUM    Collection Time: 11/07/21 10:45 AM   Result Value Ref Range    Magnesium 2.3 1.6 - 2.4 mg/dL   CBC WITH AUTOMATED DIFF    Collection Time: 11/07/21 10:45 AM   Result Value Ref Range    WBC 7.0 3.6 - 11.0 K/uL    RBC 4.79 3.80 - 5.20 M/uL    HGB 15.6 11.5 - 16.0 g/dL    HCT 45.4 35.0 - 47.0 %    MCV 94.8 80.0 - 99.0 FL    MCH 32.6 26.0 - 34.0 PG    MCHC 34.4 30.0 - 36.5 g/dL    RDW 14.6 (H) 11.5 - 14.5 %    PLATELET 388 624 - 962 K/uL    MPV 10.8 8.9 - 12.9 FL    NEUTROPHILS 68 32 - 75 %    LYMPHOCYTES 18 12 - 49 %    MONOCYTES 12 5 - 13 %    EOSINOPHILS 2 0 - 7 %    BASOPHILS 0 0 - 1 %    IMMATURE GRANULOCYTES 0 0.0 - 0.5 %    ABS. NEUTROPHILS 4.7 1.8 - 8.0 K/UL    ABS. LYMPHOCYTES 1.3 0.8 - 3.5 K/UL    ABS. MONOCYTES 0.8 0.0 - 1.0 K/UL    ABS. EOSINOPHILS 0.1 0.0 - 0.4 K/UL    ABS. BASOPHILS 0.0 0.0 - 0.1 K/UL    ABS. IMM. GRANS. 0.0 0.00 - 0.04 K/UL    DF AUTOMATED     TROPONIN-HIGH SENSITIVITY    Collection Time: 11/07/21 10:45 AM   Result Value Ref Range    Troponin-High Sensitivity 7 0 - 51 ng/L       Radiologic Studies -   @lastxrresult@  CT Results  (Last 48 hours)    None        CXR Results  (Last 48 hours)               11/07/21 1101  XR CHEST PORT Final result    Impression:  No acute process. Narrative:  Portable chest, AP upright, 1053 hours. Comparison with 7/22/2021. Subtle convex rightward curvature of thoracic spine. The heart, mediastinum, and   pulmonary vasculature appear within normal limits. No evidence of pulmonary   edema, air space pneumonia, or pleural effusion. No evidence of pneumothorax.                    Medical Decision Making   - I am the first provider for this patient. - I reviewed the vital signs, available nursing notes, past medical history, past surgical history, family history and social history. - Initial assessment performed. The patients presenting problems have been discussed, and they are in agreement with the care plan formulated and outlined with them. I have encouraged them to ask questions as they arise throughout their visit. Vital Signs-Reviewed the patient's vital signs. Patient Vitals for the past 12 hrs:   Temp Pulse Resp BP SpO2   11/07/21 1048 98.4 °F (36.9 °C)       11/07/21 1045  78 18 136/70 100 %       Disposition   Disposition: DC- Adult Discharges: All of the diagnostic tests were reviewed and questions answered. Diagnosis, care plan and treatment options were discussed. The patient understands the instructions and will follow up as directed. The patients results have been reviewed with them. They have been counseled regarding their diagnosis. The patient verbally convey understanding and agreement of the signs, symptoms, diagnosis, treatment and prognosis and additionally agrees to follow up as recommended with their PCP in 24 - 48 hours. They also agree with the care-plan and convey that all of their questions have been answered. I have also put together some discharge instructions for them that include: 1) educational information regarding their diagnosis, 2) how to care for their diagnosis at home, as well a 3) list of reasons why they would want to return to the ED prior to their follow-up appointment, should their condition change. DC-The patient was given verbal follow-up instructions    Discharged    DISCHARGE PLAN:  1.    Current Discharge Medication List      CONTINUE these medications which have NOT CHANGED    Details   fluconazole (DIFLUCAN) 150 mg tablet Take 1 tablet now and repeat in 4 days  Qty: 2 Tablet, Refills: 0    Associated Diagnoses: Vaginal irritation      terconazole (TERAZOL 7) 0.4 % vaginal cream Insert 1 Applicator into vagina nightly. Qty: 45 g, Refills: 0    Associated Diagnoses: Vaginitis and vulvovaginitis      gabapentin (NEURONTIN) 800 mg tablet Take 800 mg by mouth three (3) times daily. divalproex ER (Depakote ER) 250 mg ER tablet Take 500 mg by mouth two (2) times a day. losartan (COZAAR) 25 mg tablet Take 1 Tablet by mouth daily. Qty: 30 Tablet, Refills: 0      clotrimazole-betamethasone (LOTRISONE) topical cream Apply a small amount and rub into the external skin of the vagina twice a day  Qty: 15 g, Refills: 1    Associated Diagnoses: Chronic vulvitis; Vaginal irritation      alendronate (FOSAMAX) 70 mg tablet Take 1 Tablet by mouth every seven (7) days. Qty: 12 Tablet, Refills: 3    Associated Diagnoses: Osteopenia, unspecified location      carvediloL (COREG) 3.125 mg tablet Take 3.125 mg by mouth two (2) times daily (with meals). clobetasoL (TEMOVATE) 0.05 % topical cream Apply  to affected area two (2) times a day. Qty: 15 g, Refills: 0    Associated Diagnoses: Vaginitis and vulvovaginitis      aspirin 81 mg chewable tablet Take 1 Tab by mouth daily. Qty: 30 Tab, Refills: 0      B.ani/L.aci/L.sal/L.plan/L. Mango (PROBIOTIC FORMULA PO) Take  by mouth. teriflunomide (Aubagio) 7 mg tablet Take 7 mg by mouth every evening. baclofen (LIORESAL) 10 mg tablet Take 10 mg by mouth three (3) times daily. 3 in the morning; 3 at noon, 3 in the evening      spironolactone (ALDACTONE) 50 mg tablet Take  by mouth daily. One at noon and 2 in the evening           2. Follow-up Information     Follow up With Specialties Details Why Camelia Denney MD Family Medicine In 2 days  [address]    [address]  [phone]          3. Return to ED if worse   4.    Current Discharge Medication List      START taking these medications    Details   ketorolac (TORADOL) 10 mg tablet Take 1 Tablet by mouth every six (6) hours as needed for Pain for up to 5 days.  Clotilda Curet: 20 Tablet, Refills: 0  Start date: 11/7/2021, End date: 11/12/2021      hydrOXYzine HCL (ATARAX) 50 mg tablet Take 1 Tablet by mouth every six (6) hours as needed for Anxiety or Sleep for up to 10 days. Qty: 20 Tablet, Refills: 0  Start date: 11/7/2021, End date: 11/17/2021               Diagnosis     Clinical Impression:   1. Acute chest pain        Attestations:    Amanda George MD    Please note that this dictation was completed with duuin, the [employer] voice recognition software. Quite often unanticipated grammatical, syntax, homophones, and other interpretive errors are inadvertently transcribed by the computer software. Please disregard these errors. Please excuse any errors that have escaped final proofreading. Thank you.

## 2021-11-08 LAB
ATRIAL RATE: 88 BPM
CALCULATED P AXIS, ECG09: 77 DEGREES
CALCULATED R AXIS, ECG10: 58 DEGREES
CALCULATED T AXIS, ECG11: 60 DEGREES
DIAGNOSIS, 93000: NORMAL
P-R INTERVAL, ECG05: 160 MS
Q-T INTERVAL, ECG07: 358 MS
QRS DURATION, ECG06: 84 MS
QTC CALCULATION (BEZET), ECG08: 433 MS
VENTRICULAR RATE, ECG03: 88 BPM

## 2021-11-09 ENCOUNTER — TELEPHONE (OUTPATIENT)
Dept: OBGYN CLINIC | Age: 56
End: 2021-11-09

## 2021-11-09 NOTE — TELEPHONE ENCOUNTER
Returned the patient's call and she is c/o vaginal irritation with redness. States it's hard for her to sit. She is not using the Clobetasol twice daily as prescribed. Per Dr Martha Damon, patient advised to restart the Clobetasol cream twice daily. Patient states she will give it a week to see if there is an improvement in her symptoms and call back.

## 2021-12-06 ENCOUNTER — TELEPHONE (OUTPATIENT)
Dept: OBGYN CLINIC | Age: 56
End: 2021-12-06

## 2021-12-06 DIAGNOSIS — N76.0 VAGINITIS AND VULVOVAGINITIS: ICD-10-CM

## 2021-12-06 RX ORDER — CLOBETASOL PROPIONATE 0.5 MG/G
CREAM TOPICAL 2 TIMES DAILY
Qty: 30 G | Refills: 1 | Status: SHIPPED | OUTPATIENT
Start: 2021-12-06 | End: 2022-02-12

## 2022-02-12 ENCOUNTER — HOSPITAL ENCOUNTER (EMERGENCY)
Age: 57
Discharge: HOME | End: 2022-02-12
Attending: EMERGENCY MEDICINE
Payer: MEDICARE

## 2022-02-12 VITALS
HEIGHT: 64 IN | OXYGEN SATURATION: 99 % | DIASTOLIC BLOOD PRESSURE: 75 MMHG | BODY MASS INDEX: 21.34 KG/M2 | SYSTOLIC BLOOD PRESSURE: 150 MMHG | RESPIRATION RATE: 18 BRPM | HEART RATE: 87 BPM | TEMPERATURE: 99 F | WEIGHT: 125 LBS

## 2022-02-12 DIAGNOSIS — R10.84 ABDOMINAL PAIN, GENERALIZED: Primary | ICD-10-CM

## 2022-02-12 LAB
ALBUMIN SERPL-MCNC: 3.6 G/DL (ref 3.5–5)
ALBUMIN/GLOB SERPL: 0.9 {RATIO} (ref 1.1–2.2)
ALP SERPL-CCNC: 159 U/L (ref 45–117)
ALT SERPL-CCNC: 39 U/L (ref 12–78)
ANION GAP SERPL CALC-SCNC: 8 MMOL/L (ref 5–15)
APPEARANCE UR: CLEAR
AST SERPL W P-5'-P-CCNC: 32 U/L (ref 15–37)
BASOPHILS # BLD: 0.1 K/UL (ref 0–0.1)
BASOPHILS NFR BLD: 1 % (ref 0–1)
BILIRUB SERPL-MCNC: 0.5 MG/DL (ref 0.2–1)
BILIRUB UR QL: NEGATIVE
BUN SERPL-MCNC: 19 MG/DL (ref 6–20)
BUN/CREAT SERPL: 22 (ref 12–20)
CA-I BLD-MCNC: 9.6 MG/DL (ref 8.5–10.1)
CHLORIDE SERPL-SCNC: 100 MMOL/L (ref 97–108)
CO2 SERPL-SCNC: 31 MMOL/L (ref 21–32)
COLOR UR: YELLOW
CREAT SERPL-MCNC: 0.86 MG/DL (ref 0.55–1.02)
DIFFERENTIAL METHOD BLD: NORMAL
EOSINOPHIL # BLD: 0.2 K/UL (ref 0–0.4)
EOSINOPHIL NFR BLD: 3 % (ref 0–7)
ERYTHROCYTE [DISTWIDTH] IN BLOOD BY AUTOMATED COUNT: 14.5 % (ref 11.5–14.5)
GLOBULIN SER CALC-MCNC: 4.1 G/DL (ref 2–4)
GLUCOSE SERPL-MCNC: 106 MG/DL (ref 65–100)
GLUCOSE UR STRIP.AUTO-MCNC: NEGATIVE MG/DL
HCT VFR BLD AUTO: 42.7 % (ref 35–47)
HGB BLD-MCNC: 14.7 G/DL (ref 11.5–16)
HGB UR QL STRIP: NEGATIVE
IMM GRANULOCYTES # BLD AUTO: 0 K/UL (ref 0–0.04)
IMM GRANULOCYTES NFR BLD AUTO: 0 % (ref 0–0.5)
KETONES UR QL STRIP.AUTO: NEGATIVE MG/DL
LEUKOCYTE ESTERASE UR QL STRIP.AUTO: NEGATIVE
LYMPHOCYTES # BLD: 0.9 K/UL (ref 0.8–3.5)
LYMPHOCYTES NFR BLD: 13 % (ref 12–49)
MCH RBC QN AUTO: 31.3 PG (ref 26–34)
MCHC RBC AUTO-ENTMCNC: 34.4 G/DL (ref 30–36.5)
MCV RBC AUTO: 91 FL (ref 80–99)
MONOCYTES # BLD: 0.8 K/UL (ref 0–1)
MONOCYTES NFR BLD: 11 % (ref 5–13)
NEUTS SEG # BLD: 4.9 K/UL (ref 1.8–8)
NEUTS SEG NFR BLD: 72 % (ref 32–75)
NITRITE UR QL STRIP.AUTO: NEGATIVE
PH UR STRIP: 8 [PH] (ref 5–8)
PLATELET # BLD AUTO: 272 K/UL (ref 150–400)
PMV BLD AUTO: 10.6 FL (ref 8.9–12.9)
POTASSIUM SERPL-SCNC: 4.1 MMOL/L (ref 3.5–5.1)
PROT SERPL-MCNC: 7.7 G/DL (ref 6.4–8.2)
PROT UR STRIP-MCNC: NEGATIVE MG/DL
RBC # BLD AUTO: 4.69 M/UL (ref 3.8–5.2)
SODIUM SERPL-SCNC: 139 MMOL/L (ref 136–145)
SP GR UR REFRACTOMETRY: 1.01 (ref 1–1.03)
UROBILINOGEN UR QL STRIP.AUTO: 0.1 EU/DL (ref 0.2–1)
WBC # BLD AUTO: 6.8 K/UL (ref 3.6–11)

## 2022-02-12 PROCEDURE — 99283 EMERGENCY DEPT VISIT LOW MDM: CPT

## 2022-02-12 PROCEDURE — 81003 URINALYSIS AUTO W/O SCOPE: CPT

## 2022-02-12 PROCEDURE — 85025 COMPLETE CBC W/AUTO DIFF WBC: CPT

## 2022-02-12 PROCEDURE — 80053 COMPREHEN METABOLIC PANEL: CPT

## 2022-02-12 RX ORDER — OMEPRAZOLE 40 MG/1
40 CAPSULE, DELAYED RELEASE ORAL DAILY
COMMUNITY

## 2022-02-12 RX ORDER — ASCORBIC ACID 500 MG
500 TABLET ORAL
COMMUNITY

## 2022-02-12 RX ORDER — PHENAZOPYRIDINE HYDROCHLORIDE 200 MG/1
200 TABLET, FILM COATED ORAL
Qty: 6 TABLET | Refills: 0 | Status: SHIPPED | OUTPATIENT
Start: 2022-02-12 | End: 2022-02-14

## 2022-02-12 RX ORDER — HYDROXYZINE 25 MG/1
25 TABLET, FILM COATED ORAL
COMMUNITY

## 2022-02-12 RX ORDER — CLOBETASOL PROPIONATE 0.5 MG/G
CREAM TOPICAL 2 TIMES DAILY
Status: ON HOLD | COMMUNITY
End: 2022-10-10

## 2022-02-12 NOTE — DISCHARGE INSTRUCTIONS
Thank you! Thank you for allowing me to care for you in the emergency department. I sincerely hope that you are satisfied with your visit today. It is my goal to provide you with excellent care. Below you will find a list of your labs and imaging from your visit today. Should you have any questions regarding these results please do not hesitate to call the emergency department. Labs -     Recent Results (from the past 12 hour(s))   URINALYSIS W/ RFLX MICROSCOPIC    Collection Time: 02/12/22  8:00 AM   Result Value Ref Range    Color Yellow      Appearance Clear Clear      Specific gravity 1.015 1.003 - 1.030      pH (UA) 8.0 5.0 - 8.0      Protein Negative Negative mg/dL    Glucose Negative Negative mg/dL    Ketone Negative Negative mg/dL    Bilirubin Negative Negative      Blood Negative Negative      Urobilinogen 0.1 (L) 0.2 - 1.0 EU/dL    Nitrites Negative Negative      Leukocyte Esterase Negative Negative     CBC WITH AUTOMATED DIFF    Collection Time: 02/12/22  8:15 AM   Result Value Ref Range    WBC 6.8 3.6 - 11.0 K/uL    RBC 4.69 3.80 - 5.20 M/uL    HGB 14.7 11.5 - 16.0 g/dL    HCT 42.7 35.0 - 47.0 %    MCV 91.0 80.0 - 99.0 FL    MCH 31.3 26.0 - 34.0 PG    MCHC 34.4 30.0 - 36.5 g/dL    RDW 14.5 11.5 - 14.5 %    PLATELET 214 654 - 474 K/uL    MPV 10.6 8.9 - 12.9 FL    NEUTROPHILS 72 32 - 75 %    LYMPHOCYTES 13 12 - 49 %    MONOCYTES 11 5 - 13 %    EOSINOPHILS 3 0 - 7 %    BASOPHILS 1 0 - 1 %    IMMATURE GRANULOCYTES 0 0.0 - 0.5 %    ABS. NEUTROPHILS 4.9 1.8 - 8.0 K/UL    ABS. LYMPHOCYTES 0.9 0.8 - 3.5 K/UL    ABS. MONOCYTES 0.8 0.0 - 1.0 K/UL    ABS. EOSINOPHILS 0.2 0.0 - 0.4 K/UL    ABS. BASOPHILS 0.1 0.0 - 0.1 K/UL    ABS. IMM.  GRANS. 0.0 0.00 - 0.04 K/UL    DF AUTOMATED     METABOLIC PANEL, COMPREHENSIVE    Collection Time: 02/12/22  8:15 AM   Result Value Ref Range    Sodium 139 136 - 145 mmol/L    Potassium 4.1 3.5 - 5.1 mmol/L    Chloride 100 97 - 108 mmol/L    CO2 31 21 - 32 mmol/L    Anion gap 8 5 - 15 mmol/L    Glucose 106 (H) 65 - 100 mg/dL    BUN 19 6 - 20 mg/dL    Creatinine 0.86 0.55 - 1.02 mg/dL    BUN/Creatinine ratio 22 (H) 12 - 20      GFR est AA >60 >60 ml/min/1.73m2    GFR est non-AA >60 >60 ml/min/1.73m2    Calcium 9.6 8.5 - 10.1 mg/dL    Bilirubin, total 0.5 0.2 - 1.0 mg/dL    AST (SGOT) 32 15 - 37 U/L    ALT (SGPT) 39 12 - 78 U/L    Alk. phosphatase 159 (H) 45 - 117 U/L    Protein, total 7.7 6.4 - 8.2 g/dL    Albumin 3.6 3.5 - 5.0 g/dL    Globulin 4.1 (H) 2.0 - 4.0 g/dL    A-G Ratio 0.9 (L) 1.1 - 2.2         Radiologic Studies -   No orders to display     CT Results  (Last 48 hours)      None          CXR Results  (Last 48 hours)      None               If you feel that you have not received excellent quality care or timely care, please ask to speak to the nurse manager. Please choose us in the future for your continued health care needs. ------------------------------------------------------------------------------------------------------------  The exam and treatment you received in the Emergency Department were for an urgent problem and are not intended as complete care. It is important that you follow-up with a doctor, nurse practitioner, or physician assistant to:  (1) confirm your diagnosis,  (2) re-evaluation of changes in your illness and treatment, and  (3) for ongoing care. If your symptoms become worse or you do not improve as expected and you are unable to reach your usual health care provider, you should return to the Emergency Department. We are available 24 hours a day. Please take your discharge instructions with you when you go to your follow-up appointment. If you have any problem arranging a follow-up appointment, contact the Emergency Department immediately. If a prescription has been provided, please have it filled as soon as possible to prevent a delay in treatment. Read the entire medication instruction sheet provided to you by the pharmacy.  If you have any questions or reservations about taking the medication due to side effects or interactions with other medications, please call your primary care physician or contact the ER to speak with the charge nurse. Make an appointment with your family doctor or the physician you were referred to for follow-up of this visit as instructed on your discharge paperwork, as this is a mandatory follow-up. Return to the ER if you are unable to be seen or if you are unable to be seen in a timely manner. If you have any problem arranging the follow-up visit, contact the Emergency Department immediately.

## 2022-02-12 NOTE — ED PROVIDER NOTES
EMERGENCY DEPARTMENT HISTORY AND PHYSICAL EXAM        Date: 2/12/2022  Patient Name: Brittanie Raman    History of Presenting Illness     Chief Complaint   Patient presents with    Urinary Frequency       History Provided By: Patient    HPI: Brittanie Raman, 61 y.o. female with history of GERD, Eyad-Danlos syndrome, mitral valve prolapse, and multiple sclerosis who presents with lower abdominal discomfort. States it has been intermittent for the last year. Over the last couple months it has been causing her problems. She has some episodes of urinary retention about a month ago that resolved after 2 days. States that the pain is in the lower abdomen, mild, radiates bilaterally and feels like pressure. States that she has been having some difficulty with urination over the last couple of days and has had to strain to urinate. She is having increased frequency. No dysuria or hematuria. PCP: Con Wheatley MD    Current Outpatient Medications   Medication Sig Dispense Refill    teriflunomide (Aubagio) 7 mg tablet Take 7 mg by mouth daily.  clobetasoL (Temovate) 0.05 % topical cream Apply  to affected area two (2) times a day.  ascorbic acid, vitamin C, (Vitamin C) 500 mg tablet Take 500 mg by mouth.  omeprazole (PRILOSEC) 40 mg capsule Take 40 mg by mouth daily.  hydrOXYzine HCL (ATARAX) 25 mg tablet Take 25 mg by mouth three (3) times daily as needed.  multivitamin, tx-iron-ca-min (THERA-M w/ IRON) 9 mg iron-400 mcg tab tablet Take 1 Tablet by mouth daily.  phenazopyridine (Pyridium) 200 mg tablet Take 1 Tablet by mouth three (3) times daily as needed for Pain for up to 2 days. 6 Tablet 0    gabapentin (NEURONTIN) 800 mg tablet Take 800 mg by mouth three (3) times daily.  divalproex ER (Depakote ER) 250 mg ER tablet Take 500 mg by mouth two (2) times a day.  alendronate (FOSAMAX) 70 mg tablet Take 1 Tablet by mouth every seven (7) days.  12 Tablet 3    carvediloL (COREG) 3.125 mg tablet Take 3.125 mg by mouth two (2) times daily (with meals).  aspirin 81 mg chewable tablet Take 1 Tab by mouth daily. 30 Tab 0    baclofen (LIORESAL) 10 mg tablet Take 10 mg by mouth three (3) times daily. 3 in the morning; 3 at noon, 3 in the evening      spironolactone (ALDACTONE) 50 mg tablet Take  by mouth daily. One at noon and 2 in the evening      B.ani/L.aci/L.sal/L.plan/L. Mango (PROBIOTIC FORMULA PO) Take  by mouth. (Patient not taking: Reported on 2/12/2022)         Past History     Past Medical History:  Past Medical History:   Diagnosis Date    Arthritis     patient states not aware of this dx    Bladder outlet obstruction     Eyad-Danlos syndrome     GERD (gastroesophageal reflux disease)     Incomplete bladder emptying     MS (multiple sclerosis) (HCC)     MVP (mitral valve prolapse)     patient states told years ago, doesnt see cardiology    Recurrent UTI     UTI (urinary tract infection)        Past Surgical History:  Past Surgical History:   Procedure Laterality Date    HX CYSTOSTOMY      HX UROLOGICAL  04/14/2021    CYSTOURETHROSCOPY    HX UROLOGICAL  04/14/2021    BILATERAL  RETROGRADE PYELOGRAMS    HX UROLOGICAL  04/14/2021    URETHRAL DILATION    HX WISDOM TEETH EXTRACTION         Family History:  Family History   Problem Relation Age of Onset    Hypertension Mother     Elevated Lipids Mother     Cancer Father         PROSTATE    Elevated Lipids Father        Social History:  Social History     Tobacco Use    Smoking status: Never Smoker    Smokeless tobacco: Never Used   Vaping Use    Vaping Use: Never used   Substance Use Topics    Alcohol use: Not Currently    Drug use: Never       Allergies:   Allergies   Allergen Reactions    Ciprofloxacin Rash    Fentanyl Rash    Nitrofurantoin Rash    Nortriptyline Vertigo    Prozac [Fluoxetine] Rash    Sulfa (Sulfonamide Antibiotics) Rash    Wellbutrin [Bupropion] Rash    Zofran [Ondansetron Hcl] Other (comments)     constipation    Diprivan [Propofol] Hives and Shortness of Breath         Review of Systems   Review of Systems   Constitutional: Negative for fever. HENT: Negative for congestion. Eyes: Negative for visual disturbance. Respiratory: Negative for shortness of breath. Gastrointestinal: Positive for abdominal pain. Genitourinary: Positive for frequency. Negative for dysuria. Musculoskeletal: Negative for arthralgias. Skin: Negative for rash. Neurological: Negative for headaches. Physical Exam   Constitutional: No acute distress. Well-nourished. Skin: No rash. ENT: No rhinorrhea. No cough. Head is normocephalic and atraumatic. Eye: No proptosis or conjunctival injections. Respiratory: No apparent respiratory distress. Gastrointestinal: Nondistended. No significant tenderness to the abdomen. : No suprapubic tenderness. Musculoskeletal: No obvious bony deformities. Psychiatric: Cooperative. Appropriate mood and affect.     Diagnostic Study Results     Labs -     Recent Results (from the past 24 hour(s))   URINALYSIS W/ RFLX MICROSCOPIC    Collection Time: 02/12/22  8:00 AM   Result Value Ref Range    Color Yellow      Appearance Clear Clear      Specific gravity 1.015 1.003 - 1.030      pH (UA) 8.0 5.0 - 8.0      Protein Negative Negative mg/dL    Glucose Negative Negative mg/dL    Ketone Negative Negative mg/dL    Bilirubin Negative Negative      Blood Negative Negative      Urobilinogen 0.1 (L) 0.2 - 1.0 EU/dL    Nitrites Negative Negative      Leukocyte Esterase Negative Negative     CBC WITH AUTOMATED DIFF    Collection Time: 02/12/22  8:15 AM   Result Value Ref Range    WBC 6.8 3.6 - 11.0 K/uL    RBC 4.69 3.80 - 5.20 M/uL    HGB 14.7 11.5 - 16.0 g/dL    HCT 42.7 35.0 - 47.0 %    MCV 91.0 80.0 - 99.0 FL    MCH 31.3 26.0 - 34.0 PG    MCHC 34.4 30.0 - 36.5 g/dL    RDW 14.5 11.5 - 14.5 %    PLATELET 613 553 - 196 K/uL    MPV 10.6 8.9 - 12.9 FL    NEUTROPHILS 72 32 - 75 %    LYMPHOCYTES 13 12 - 49 %    MONOCYTES 11 5 - 13 %    EOSINOPHILS 3 0 - 7 %    BASOPHILS 1 0 - 1 %    IMMATURE GRANULOCYTES 0 0.0 - 0.5 %    ABS. NEUTROPHILS 4.9 1.8 - 8.0 K/UL    ABS. LYMPHOCYTES 0.9 0.8 - 3.5 K/UL    ABS. MONOCYTES 0.8 0.0 - 1.0 K/UL    ABS. EOSINOPHILS 0.2 0.0 - 0.4 K/UL    ABS. BASOPHILS 0.1 0.0 - 0.1 K/UL    ABS. IMM. GRANS. 0.0 0.00 - 0.04 K/UL    DF AUTOMATED     METABOLIC PANEL, COMPREHENSIVE    Collection Time: 02/12/22  8:15 AM   Result Value Ref Range    Sodium 139 136 - 145 mmol/L    Potassium 4.1 3.5 - 5.1 mmol/L    Chloride 100 97 - 108 mmol/L    CO2 31 21 - 32 mmol/L    Anion gap 8 5 - 15 mmol/L    Glucose 106 (H) 65 - 100 mg/dL    BUN 19 6 - 20 mg/dL    Creatinine 0.86 0.55 - 1.02 mg/dL    BUN/Creatinine ratio 22 (H) 12 - 20      GFR est AA >60 >60 ml/min/1.73m2    GFR est non-AA >60 >60 ml/min/1.73m2    Calcium 9.6 8.5 - 10.1 mg/dL    Bilirubin, total 0.5 0.2 - 1.0 mg/dL    AST (SGOT) 32 15 - 37 U/L    ALT (SGPT) 39 12 - 78 U/L    Alk. phosphatase 159 (H) 45 - 117 U/L    Protein, total 7.7 6.4 - 8.2 g/dL    Albumin 3.6 3.5 - 5.0 g/dL    Globulin 4.1 (H) 2.0 - 4.0 g/dL    A-G Ratio 0.9 (L) 1.1 - 2.2         Radiologic Studies -   No orders to display     CT Results  (Last 48 hours)    None        CXR Results  (Last 48 hours)    None          Medical Decision Making and ED Course     I reviewed the available vital signs, nursing notes, past medical history, past surgical history, family history, and social history. Vital Signs - Reviewed the patient's vital signs. Patient Vitals for the past 12 hrs:   Temp Pulse Resp BP SpO2   02/12/22 0759 99 °F (37.2 °C) 87 18 (!) 150/75 99 %     Medical Decision Making:   Presented with increased urinary frequency, bladder discomfort, lower abdominal pain. The differential diagnosis is urinary tract infection, urinary retention, acute renal failure. Work-up is unremarkable. No signs of UTI. No leukocytosis.   Feel no need for CT scan or other further test at this time. Bladder scan was used and shows no signs of retention. Patient discharged in good condition with return precautions. She was requesting Pyridium so I did prescribe this. Discussed oxybutynin as well however patient does not want to try this at this time due to risk of urinary retention. Recommended follow-up with urology and OB/GYN which she does have appointments for in March 2022. Unclear etiology of her symptoms at this time however I do not feel any more emergent testing to be done here. Disposition     Discharged      DISCHARGE PLAN:  1. Current Discharge Medication List      CONTINUE these medications which have NOT CHANGED    Details   teriflunomide (Aubagio) 7 mg tablet Take 7 mg by mouth daily. clobetasoL (Temovate) 0.05 % topical cream Apply  to affected area two (2) times a day. ascorbic acid, vitamin C, (Vitamin C) 500 mg tablet Take 500 mg by mouth. omeprazole (PRILOSEC) 40 mg capsule Take 40 mg by mouth daily. hydrOXYzine HCL (ATARAX) 25 mg tablet Take 25 mg by mouth three (3) times daily as needed. multivitamin, tx-iron-ca-min (THERA-M w/ IRON) 9 mg iron-400 mcg tab tablet Take 1 Tablet by mouth daily. gabapentin (NEURONTIN) 800 mg tablet Take 800 mg by mouth three (3) times daily. divalproex ER (Depakote ER) 250 mg ER tablet Take 500 mg by mouth two (2) times a day. alendronate (FOSAMAX) 70 mg tablet Take 1 Tablet by mouth every seven (7) days. Qty: 12 Tablet, Refills: 3    Associated Diagnoses: Osteopenia, unspecified location      carvediloL (COREG) 3.125 mg tablet Take 3.125 mg by mouth two (2) times daily (with meals). aspirin 81 mg chewable tablet Take 1 Tab by mouth daily. Qty: 30 Tab, Refills: 0      baclofen (LIORESAL) 10 mg tablet Take 10 mg by mouth three (3) times daily. 3 in the morning; 3 at noon, 3 in the evening      spironolactone (ALDACTONE) 50 mg tablet Take  by mouth daily.  One at noon and 2 in the evening      B.ani/L.aci/L.sal/L.plan/L. Mango (PROBIOTIC FORMULA PO) Take  by mouth. 2.   Follow-up Information     Follow up With Specialties Details Why Contact Info    [address] [facility] Emergency Medicine Go today As soon as possible if symptoms worsen [address]  [phone]    Your OB/GYN and urology doctor            3. Return to ED if worse     Diagnosis     Clinical impression:   1. Abdominal pain, generalized           Attestation:  Please note that this dictation was completed with [employer], the computer voice recognition software. Quite often unanticipated grammatical, syntax, homophones, and other interpretive errors are inadvertently transcribed by the computer software. Please disregard these errors. Please excuse any errors that have escaped final proofreading. Thank you.   Mahin Rich, DO

## 2022-02-12 NOTE — ED TRIAGE NOTES
Pt with hx of MS. Ongoing pressure in bladder area. Sometimes area becomes tighter and pain goes up and around the umbilical area. Now having urinary frequency and having to squeeze to urinate.

## 2022-03-08 ENCOUNTER — OFFICE VISIT (OUTPATIENT)
Dept: OBGYN CLINIC | Age: 57
End: 2022-03-08
Payer: MEDICARE

## 2022-03-08 VITALS
SYSTOLIC BLOOD PRESSURE: 126 MMHG | OXYGEN SATURATION: 94 % | DIASTOLIC BLOOD PRESSURE: 71 MMHG | HEART RATE: 74 BPM | HEIGHT: 64 IN | RESPIRATION RATE: 16 BRPM | BODY MASS INDEX: 21.34 KG/M2 | WEIGHT: 125 LBS

## 2022-03-08 DIAGNOSIS — R10.2 PELVIC PAIN IN FEMALE: Primary | ICD-10-CM

## 2022-03-08 DIAGNOSIS — R10.2 PELVIC PAIN IN FEMALE: ICD-10-CM

## 2022-03-08 PROCEDURE — G8432 DEP SCR NOT DOC, RNG: HCPCS | Performed by: OBSTETRICS & GYNECOLOGY

## 2022-03-08 PROCEDURE — 3017F COLORECTAL CA SCREEN DOC REV: CPT | Performed by: OBSTETRICS & GYNECOLOGY

## 2022-03-08 PROCEDURE — G8420 CALC BMI NORM PARAMETERS: HCPCS | Performed by: OBSTETRICS & GYNECOLOGY

## 2022-03-08 PROCEDURE — 99213 OFFICE O/P EST LOW 20 MIN: CPT | Performed by: OBSTETRICS & GYNECOLOGY

## 2022-03-08 PROCEDURE — G9899 SCRN MAM PERF RSLTS DOC: HCPCS | Performed by: OBSTETRICS & GYNECOLOGY

## 2022-03-08 PROCEDURE — G8427 DOCREV CUR MEDS BY ELIG CLIN: HCPCS | Performed by: OBSTETRICS & GYNECOLOGY

## 2022-03-10 LAB — BACTERIA UR CULT: NORMAL

## 2022-03-11 ENCOUNTER — TELEPHONE (OUTPATIENT)
Dept: OBGYN CLINIC | Age: 57
End: 2022-03-11

## 2022-03-13 NOTE — PROGRESS NOTES
HISTORY OF PRESENT ILLNESS  Yanira Jane is a 61 y.o. female cc of abdominal tightening and difficulty urinating. She is patient of Dr. Colton López,     She had urethral stricture that was dilated to 45 [place] on 4/14/21. Initially, she reported improvement in her voiding symptoms. Two months later, she continues to always feel as if she has a UTI. She had a vague suprapubic pressure and feelings of incomplete emptying, prolonged emptying, or straining. pvr 2 cc  History of UTI infection. She had low residual but had feelings of incomplete emptying and urgency or prolonged voiding. She does empty. CT scan 6/24/21: small, non-obstructive stone in the right kidney. No hydronephrosis. Empty bladder. No additional abdominal abnormalities. She has MS since  2002 and latent sclerosis 2003   In May of last year had pelvic tightening which would come and go. But  In mid January, 2022 feels like she has to strain to void all the time and has constant suprapubic pressure  She has been checked for UTI and it was negative. [place] frequency,urgency. She has had a flareup of her lichen sclerosis. Lichen sclerosus has been known to cause urethral strictures which may be part of her problem. Additionally she is postmenopausal and may have some problems from her urethra from that as well. A long discussion about both of the possibilities. We talked about dilation we talked with estrogen cream and we talked about intermittent catheterization daily if. Stenosis or stricture becomes chronic in nature         Past Medical History:  PMHx (including negatives):  has a past medical history of Arthritis, Bladder outlet obstruction, Eyad-Danlos syndrome, GERD (gastroesophageal reflux disease), Incomplete bladder emptying, MS (multiple sclerosis) ([facility]), MVP (mitral valve prolapse), Recurrent UTI, and UTI (urinary tract infection).    PSurgHx:  has a past surgical history that includes hx wisdom teeth extraction; hx urological (04/14/2021); hx urological (04/14/2021); hx urological (04/14/2021); and hx cystostomy. PSocHx:  reports that she has never smoked. She has never used smokeless tobacco. She reports previous alcohol use. She reports that she does not use drugs. Chronic Conditions Addressed Today     1. Recurrent UTI - Primary     Overview      Symptoms of UTI: \"heavy sensation\" in the lower abdomen and pelvis, she denies fever or chills, nausea or vomiting. She has multiple drug allergies. 4/29/21 culture with mixed urogenital lo, 25,000-50,000 colony forming units per mL.    4/14/21 culture with no growth (<1000 cfu/mL). 4/6/21 culture with proteus mirabilis, 25,000-50,000 colony forming units per mL. CT (non-contrast) done on 6/24/21 shows small stone in the right kidney, non-obstructive. No hydronephrosis. Normal kidneys. Symmetric perfusion and excretion. Relevant Medications     zinc 50 mg tab tablet     ergocalciferol (Vitamin D2) 1,250 mcg (50,000 unit) capsule    2. Incomplete bladder emptying     Overview      Dr. Delia Mejia noted bladder outlet obstruction secondary to urethral stenosis; resolved with cystourethroscopy with urethral dilation on 4/14/21. She still has feelings of incomplete emptying at times, but more so, she is bothered by a suprapubic pressure. PVR 6/15/21 was 0 mls. She is emptying well. Relevant Medications     zinc 50 mg tab tablet     ergocalciferol (Vitamin D2) 1,250 mcg (50,000 unit) capsule    3. Stricture of female urethra     Overview      She has been managed by Dr. Gopi Ricardo. 6/15/21: She is s/p cystoscopy, bilateral RPG, and urethral dilation on 4/14/21. Urethra was calibrated and found to be narrowed at 20-Azeri. Urethral dilation to 38-Azeri was performed with [place] dilators. Initially, she reported improvement in her voiding symptoms. Now, she continues to always feel as if she has a UTI.   She has a vague suprapubic pressure and feelings of incomplete emptying, prolonged emptying, or straining. Relevant Orders     AMB POC URINALYSIS DIP STICK AUTO W/O MICRO (Completed)     AMB POC PVR, BARB,POST-VOID RES,US,NON-IMAGING (Completed)    4. Suprapubic pressure     Overview      6/15/21: She has non-specific complaints of intermittent suprapubic pressure or pressure in her bladder with sensation heaviness or pressure. They come and go with no alleviating or exacerbating factors. Sometimes accompanied by feelings of incomplete emptying and urgency or prolonged voiding. She does empty. She has always attributed the pains to her UTI, but now they are occurring in the absence of UTI. She is more concerned. She wonders if she has cystitis (perhaps she means interstitial cystitis). CT scan 6/24/21: small, non-obstructive stone in the right kidney. No hydronephrosis. Empty bladder. No additional abdominal abnormalities. 5. Lichen sclerosus    6. Menopause ovarian failure     Relevant Medications     ergocalciferol (Vitamin D2) 1,250 mcg (50,000 unit) capsule     estradioL (ESTRACE) 0.01 % (0.1 mg/gram) vaginal cream    7. Post-menopausal atrophic vaginitis     Relevant Medications     estradioL (ESTRACE) 0.01 % (0.1 mg/gram) vaginal cream          Review of Systems   Constitutional: Negative. HENT: Negative. Eyes: Negative. Gastrointestinal: Negative. Genitourinary:        Difficulty urinatitng   Skin: Negative. Neurological:        MS, latent sclerosis   Endo/Heme/Allergies: Negative. Patient denies the symptoms of COVID-19 per routine screening guidelines. Home Medications    Medication Sig Start Date End Date Taking? Authorizing Provider   zinc 50 mg tab tablet Take  by mouth daily. Yes Provider, Historical   ergocalciferol (Vitamin D2) 1,250 mcg (50,000 unit) capsule Take 50,000 Units by mouth.    Yes Provider, Historical   vit D3/vit K2/calc frutoborate (VIT D3-VIT K2-CA FRUCTOBORATE PO) Take by mouth. Yes Provider, Historical   estradioL (ESTRACE) 0.01 % (0.1 mg/gram) vaginal cream Place pea size of the cream on affected area, on Monday,Wensday,Friday 3/16/22  Yes Samuel Currie NP   fluconazole (DIFLUCAN) 150 mg tablet Take 1 tablet now and repeat in 4 days 3/14/22  Yes Lisbet Vegas MD   teriflunomide (Aubagio) 7 mg tablet Take  by mouth daily. Yes Other, MD Jhonny   clobetasoL (Temovate) 0.05 % topical cream Apply  to affected area two (2) times a day. Yes Other, MD Jhonny   ascorbic acid, vitamin C, (Vitamin C) 500 mg tablet Take 500 mg by mouth. Yes Other, MD Jhonny   omeprazole (PRILOSEC) 40 mg capsule Take 40 mg by mouth daily. Yes Other, MD Jhonny   hydrOXYzine HCL (ATARAX) 25 mg tablet Take 25 mg by mouth three (3) times daily as needed. Yes Other, MD Jhonny   multivitamin, tx-iron-ca-min (THERA-M w/ IRON) 9 mg iron-400 mcg tab tablet Take 1 Tablet by mouth daily. Yes Other, MD Jhonny   gabapentin (NEURONTIN) 800 mg tablet Take 800 mg by mouth three (3) times daily. Yes Provider, Historical   divalproex ER (Depakote ER) 250 mg ER tablet Take 500 mg by mouth two (2) times a day. Yes Provider, Historical   alendronate (FOSAMAX) 70 mg tablet Take 1 Tablet by mouth every seven (7) days. 6/22/21  Yes Lisbet Vegas MD   carvediloL (COREG) 3.125 mg tablet Take 3.125 mg by mouth two (2) times daily (with meals). Yes Provider, Historical   aspirin 81 mg chewable tablet Take 1 Tab by mouth daily. 4/17/21  Yes Leanna Alegre MD   baclofen (LIORESAL) 10 mg tablet Take 10 mg by mouth three (3) times daily. 3 in the morning; 3 at noon, 3 in the evening   Yes Other, MD Jhonny   spironolactone (ALDACTONE) 50 mg tablet Take  by mouth daily. One at noon and 2 in the evening   Yes Other, MD Jhonny      Physical Exam  Vitals and nursing note reviewed. Constitutional:       Appearance: Normal appearance. HENT:      Head: Normocephalic.       Nose: Nose normal.      Mouth/Throat: Mouth: Mucous membranes are moist.   Eyes:      Pupils: Pupils are equal, round, and reactive to light. Cardiovascular:      Rate and Rhythm: Normal rate and regular rhythm. Pulmonary:      Effort: Pulmonary effort is normal.   Abdominal:      General: Abdomen is flat. Palpations: Abdomen is soft. Genitourinary:     Comments: deferred  Musculoskeletal:         General: Normal range of motion. Cervical back: Normal range of motion. Skin:     General: Skin is warm. Neurological:      General: No focal deficit present. Mental Status: She is alert and oriented to person, place, and time. Psychiatric:         Mood and Affect: Mood normal.         Behavior: Behavior normal.         Thought Content: Thought content normal.         Judgment: Judgment normal.         ASSESSMENT and PLAN  Diagnoses and all orders for this visit:    1. Recurrent UTI    2. Stricture of female urethra, unspecified stricture type  -     AMB POC URINALYSIS DIP STICK AUTO W/O MICRO  -     AMB POC PVR, BARB,POST-VOID RES,US,NON-IMAGING    3. Incomplete bladder emptying    4. Suprapubic pressure    5. Lichen sclerosus    6. Menopause ovarian failure    7. Post-menopausal atrophic vaginitis    Other orders  -     estradioL (ESTRACE) 0.01 % (0.1 mg/gram) vaginal cream; Place pea size of the cream on affected area, on Monday,Wensday,Friday       Patient needs cystoscopy urethral dilation Estrace cream.  Aware the risk of bleeding infection injury to urethra or bladder she has no questions. I spent time explaining where to put the Estrace cream with the tip fingertip Monday Wednesday and Friday no applicators          Chrisrhianna Armas may have a reminder for a \"due or due soon\" health maintenance. The patient has been encouraged to contact their primary care provider for follow-up on this health maintenance or other necessary and/or routine health screening.      Kapil Fountain MD

## 2022-03-14 ENCOUNTER — TELEPHONE (OUTPATIENT)
Dept: OBGYN CLINIC | Age: 57
End: 2022-03-14

## 2022-03-14 DIAGNOSIS — N94.9 VAGINAL DISCOMFORT: Primary | ICD-10-CM

## 2022-03-14 RX ORDER — FLUCONAZOLE 150 MG/1
TABLET ORAL
Qty: 2 TABLET | Refills: 0 | Status: SHIPPED | OUTPATIENT
Start: 2022-03-14 | End: 2022-04-04 | Stop reason: SDUPTHER

## 2022-03-14 NOTE — TELEPHONE ENCOUNTER
Returned the patient's call and she states she is experiencing what she believes to be a flare up of her Lichen Sclerosus. States she has been using the cream as prescribed but started feeling uncomfortable on Friday after the vaginal ultrasound. States it is hard for her to sit and it is most uncomfortable and red towards her rectum. She is requesting a refill on Diflucan and an appt has been scheduled for her to see Dr Xenia Uribe on 03/17/22.

## 2022-03-16 ENCOUNTER — OFFICE VISIT (OUTPATIENT)
Dept: UROLOGY | Age: 57
End: 2022-03-16
Payer: MEDICARE

## 2022-03-16 VITALS
OXYGEN SATURATION: 98 % | TEMPERATURE: 97.1 F | WEIGHT: 125 LBS | SYSTOLIC BLOOD PRESSURE: 116 MMHG | HEIGHT: 64 IN | DIASTOLIC BLOOD PRESSURE: 64 MMHG | BODY MASS INDEX: 21.34 KG/M2 | HEART RATE: 72 BPM

## 2022-03-16 DIAGNOSIS — L90.0 LICHEN SCLEROSUS: ICD-10-CM

## 2022-03-16 DIAGNOSIS — R10.2 SUPRAPUBIC PRESSURE: ICD-10-CM

## 2022-03-16 DIAGNOSIS — N39.0 RECURRENT UTI: Primary | ICD-10-CM

## 2022-03-16 DIAGNOSIS — E28.39 MENOPAUSE OVARIAN FAILURE: ICD-10-CM

## 2022-03-16 DIAGNOSIS — R33.9 INCOMPLETE BLADDER EMPTYING: ICD-10-CM

## 2022-03-16 DIAGNOSIS — N95.2 POST-MENOPAUSAL ATROPHIC VAGINITIS: ICD-10-CM

## 2022-03-16 DIAGNOSIS — N35.92 STRICTURE OF FEMALE URETHRA, UNSPECIFIED STRICTURE TYPE: ICD-10-CM

## 2022-03-16 LAB
BILIRUB UR QL: NEGATIVE
GLUCOSE UR-MCNC: NEGATIVE MG/DL
KETONES P FAST UR STRIP-MCNC: NEGATIVE MG/DL
PH UR STRIP: 7 [PH] (ref 4.6–8)
PROT UR QL STRIP: NEGATIVE
PVR POC: NORMAL CC
SP GR UR STRIP: 1.01 (ref 1–1.03)
UA UROBILINOGEN AMB POC: NORMAL (ref 0.2–1)
URINALYSIS CLARITY POC: CLEAR
URINALYSIS COLOR POC: YELLOW
URINE BLOOD POC: NEGATIVE
URINE LEUKOCYTES POC: NEGATIVE
URINE NITRITES POC: NEGATIVE

## 2022-03-16 PROCEDURE — 51798 US URINE CAPACITY MEASURE: CPT | Performed by: UROLOGY

## 2022-03-16 PROCEDURE — 81003 URINALYSIS AUTO W/O SCOPE: CPT | Performed by: UROLOGY

## 2022-03-16 PROCEDURE — G9899 SCRN MAM PERF RSLTS DOC: HCPCS | Performed by: UROLOGY

## 2022-03-16 PROCEDURE — G8420 CALC BMI NORM PARAMETERS: HCPCS | Performed by: UROLOGY

## 2022-03-16 PROCEDURE — 99214 OFFICE O/P EST MOD 30 MIN: CPT | Performed by: UROLOGY

## 2022-03-16 PROCEDURE — G8427 DOCREV CUR MEDS BY ELIG CLIN: HCPCS | Performed by: UROLOGY

## 2022-03-16 PROCEDURE — G8432 DEP SCR NOT DOC, RNG: HCPCS | Performed by: UROLOGY

## 2022-03-16 RX ORDER — ESTRADIOL 0.1 MG/G
CREAM VAGINAL
Qty: 42.5 G | Refills: 4 | Status: SHIPPED | OUTPATIENT
Start: 2022-03-16

## 2022-03-16 RX ORDER — ERGOCALCIFEROL 1.25 MG/1
50000 CAPSULE ORAL
COMMUNITY
End: 2022-05-11

## 2022-03-16 NOTE — PROGRESS NOTES
Chief Complaint   Patient presents with    New Patient     ros puff     Urinary Retention     tightness in abdomen     Incomplete Bladder Emptying       PHQ-9 score is    Negative    Vitals:    03/16/22 0930   BP: 116/64   Pulse: 72   Temp: 97.1 °F (36.2 °C)   TempSrc: Temporal   SpO2: 98%   Weight: 125 lb (56.7 kg)   Height: 5' 4\" (1.626 m)   PainSc:   0 - No pain      1. \"Have you been to the ER, urgent care clinic since your last visit? Hospitalized since your last visit? \" No    2. \"Have you seen or consulted any other health care providers outside of the [address] [place] [address] since your last visit? \" No     3. For patients aged 39-70: Has the patient had a colonoscopy / FIT/ Cologuard? No      If the patient is female:    4. For patients aged 41-77: Has the patient had a mammogram within the past 2 years? No      5. For patients aged 21-65: Has the patient had a pap smear?  No

## 2022-03-17 ENCOUNTER — OFFICE VISIT (OUTPATIENT)
Dept: OBGYN CLINIC | Age: 57
End: 2022-03-17
Payer: MEDICARE

## 2022-03-17 VITALS
SYSTOLIC BLOOD PRESSURE: 134 MMHG | BODY MASS INDEX: 21.51 KG/M2 | OXYGEN SATURATION: 99 % | RESPIRATION RATE: 16 BRPM | DIASTOLIC BLOOD PRESSURE: 69 MMHG | HEART RATE: 79 BPM | HEIGHT: 64 IN | WEIGHT: 126 LBS

## 2022-03-17 DIAGNOSIS — N94.9 VAGINAL DISCOMFORT: Primary | ICD-10-CM

## 2022-03-17 DIAGNOSIS — L90.0 LICHEN SCLEROSUS: ICD-10-CM

## 2022-03-17 DIAGNOSIS — N76.3 CHRONIC VULVITIS: ICD-10-CM

## 2022-03-17 PROCEDURE — G8432 DEP SCR NOT DOC, RNG: HCPCS | Performed by: OBSTETRICS & GYNECOLOGY

## 2022-03-17 PROCEDURE — G8427 DOCREV CUR MEDS BY ELIG CLIN: HCPCS | Performed by: OBSTETRICS & GYNECOLOGY

## 2022-03-17 PROCEDURE — 99213 OFFICE O/P EST LOW 20 MIN: CPT | Performed by: OBSTETRICS & GYNECOLOGY

## 2022-03-17 PROCEDURE — 3017F COLORECTAL CA SCREEN DOC REV: CPT | Performed by: OBSTETRICS & GYNECOLOGY

## 2022-03-17 PROCEDURE — G8420 CALC BMI NORM PARAMETERS: HCPCS | Performed by: OBSTETRICS & GYNECOLOGY

## 2022-03-17 PROCEDURE — G9899 SCRN MAM PERF RSLTS DOC: HCPCS | Performed by: OBSTETRICS & GYNECOLOGY

## 2022-03-17 RX ORDER — NYSTATIN 100000 [USP'U]/G
POWDER TOPICAL 2 TIMES DAILY
Qty: 30 G | Refills: 1 | Status: ON HOLD | OUTPATIENT
Start: 2022-03-17 | End: 2022-10-10

## 2022-03-17 NOTE — PROGRESS NOTES
Visit Vitals  /69 (BP 1 Location: Left upper arm, BP Patient Position: Sitting)   Pulse 79   Resp 16   Ht 5' 4\" (1.626 m)   Wt 126 lb (57.2 kg)   LMP  (LMP Unknown)   SpO2 99%   BMI 21.63 kg/m²     Pt is c/o of flare up in vaginal area.

## 2022-03-18 PROBLEM — G35 MULTIPLE SCLEROSIS (HCC): Status: ACTIVE | Noted: 2021-07-22

## 2022-03-19 PROBLEM — D47.2 TAKATSUKI SYNDROME: Status: ACTIVE | Noted: 2021-07-22

## 2022-03-19 PROBLEM — E88.09 TAKATSUKI SYNDROME: Status: ACTIVE | Noted: 2021-07-22

## 2022-03-19 PROBLEM — N35.92 STRICTURE OF FEMALE URETHRA: Status: ACTIVE | Noted: 2021-06-14

## 2022-03-19 PROBLEM — G62.9 TAKATSUKI SYNDROME: Status: ACTIVE | Noted: 2021-07-22

## 2022-03-19 PROBLEM — Q79.60 EHLERS-DANLOS SYNDROME: Status: ACTIVE | Noted: 2021-07-22

## 2022-03-19 PROBLEM — R07.9 CHEST PAIN: Status: ACTIVE | Noted: 2021-07-22

## 2022-03-19 PROBLEM — E34.9 TAKATSUKI SYNDROME: Status: ACTIVE | Noted: 2021-07-22

## 2022-03-19 PROBLEM — R10.2 SUPRAPUBIC PRESSURE: Status: ACTIVE | Noted: 2021-06-15

## 2022-03-19 PROBLEM — R23.9 TAKATSUKI SYNDROME: Status: ACTIVE | Noted: 2021-07-22

## 2022-03-21 NOTE — PROGRESS NOTES
Yeimy Beaulieu is a 61 y.o. female, , No LMP recorded (lmp unknown). Patient is postmenopausal., who presents today for the following:  Chief Complaint   Patient presents with    Check Up     Pt c/o pelvic tightening that never stops now. Allergies   Allergen Reactions    Ciprofloxacin Rash    Fentanyl Rash    Nitrofurantoin Rash    Nortriptyline Vertigo    Prozac [Fluoxetine] Rash    Sulfa (Sulfonamide Antibiotics) Rash    Wellbutrin [Bupropion] Rash    Zofran [Ondansetron Hcl] Other (comments)     constipation    Diprivan [Propofol] Hives and Shortness of Breath       Current Outpatient Medications   Medication Sig    teriflunomide (Aubagio) 7 mg tablet Take  by mouth daily.  clobetasoL (Temovate) 0.05 % topical cream Apply  to affected area two (2) times a day.  ascorbic acid, vitamin C, (Vitamin C) 500 mg tablet Take 500 mg by mouth.  omeprazole (PRILOSEC) 40 mg capsule Take 40 mg by mouth daily.  hydrOXYzine HCL (ATARAX) 25 mg tablet Take 25 mg by mouth three (3) times daily as needed.  multivitamin, tx-iron-ca-min (THERA-M w/ IRON) 9 mg iron-400 mcg tab tablet Take 1 Tablet by mouth daily.  gabapentin (NEURONTIN) 800 mg tablet Take 800 mg by mouth three (3) times daily.  divalproex ER (Depakote ER) 250 mg ER tablet Take 500 mg by mouth two (2) times a day.  alendronate (FOSAMAX) 70 mg tablet Take 1 Tablet by mouth every seven (7) days.  carvediloL (COREG) 3.125 mg tablet Take 3.125 mg by mouth two (2) times daily (with meals).  aspirin 81 mg chewable tablet Take 1 Tab by mouth daily.  baclofen (LIORESAL) 10 mg tablet Take 10 mg by mouth three (3) times daily. 3 in the morning; 3 at noon, 3 in the evening    spironolactone (ALDACTONE) 50 mg tablet Take  by mouth daily. One at noon and 2 in the evening    nystatin (MYCOSTATIN) powder Apply  to affected area two (2) times a day.  zinc 50 mg tab tablet Take  by mouth daily.     ergocalciferol (Vitamin D2) 1,250 mcg (50,000 unit) capsule Take 50,000 Units by mouth.  vit D3/vit K2/calc frutoborate (VIT D3-VIT K2-CA FRUCTOBORATE PO) Take  by mouth.  estradioL (ESTRACE) 0.01 % (0.1 mg/gram) vaginal cream Place pea size of the cream on affected area, on Monday,Wensday,Friday    fluconazole (DIFLUCAN) 150 mg tablet Take 1 tablet now and repeat in 4 days     No current facility-administered medications for this visit.        Past Medical History:   Diagnosis Date    Arthritis     patient states not aware of this dx    Bladder outlet obstruction     Eyad-Danlos syndrome     GERD (gastroesophageal reflux disease)     Incomplete bladder emptying     MS (multiple sclerosis) (HCC)     MVP (mitral valve prolapse)     patient states told years ago, doesnt see cardiology    Recurrent UTI     UTI (urinary tract infection)        Past Surgical History:   Procedure Laterality Date    HX CYSTOSTOMY      HX UROLOGICAL  04/14/2021    CYSTOURETHROSCOPY    HX UROLOGICAL  04/14/2021    BILATERAL  RETROGRADE PYELOGRAMS    HX UROLOGICAL  04/14/2021    URETHRAL DILATION    HX WISDOM TEETH EXTRACTION         Family History   Problem Relation Age of Onset    Hypertension Mother     Elevated Lipids Mother     Cancer Father         PROSTATE    Elevated Lipids Father        Social History     Socioeconomic History    Marital status:      Spouse name: Not on file    Number of children: Not on file    Years of education: Not on file    Highest education level: Not on file   Occupational History    Not on file   Tobacco Use    Smoking status: Never Smoker    Smokeless tobacco: Never Used   Vaping Use    Vaping Use: Never used   Substance and Sexual Activity    Alcohol use: Not Currently    Drug use: Never    Sexual activity: Yes     Partners: Male   Other Topics Concern    Not on file   Social History Narrative    Not on file     Social Determinants of Health     Financial Resource Strain:  Difficulty of Paying Living Expenses: Not on file   Food Insecurity:     Worried About Running Out of Food in the Last Year: Not on file    Ran Out of Food in the Last Year: Not on file   Transportation Needs:     Lack of Transportation (Medical): Not on file    Lack of Transportation (Non-Medical): Not on file   Physical Activity:     Days of Exercise per Week: Not on file    Minutes of Exercise per Session: Not on file   Stress:     Feeling of Stress : Not on file   Social Connections:     Frequency of Communication with Friends and Family: Not on file    Frequency of Social Gatherings with Friends and Family: Not on file    Attends Christian Services: Not on file    Active Member of [address] [place] [address] [employer] or Organizations: Not on file    Attends Club or Organization Meetings: Not on file    Marital Status: Not on file   Intimate Partner Violence:     Fear of Current or Ex-Partner: Not on file    Emotionally Abused: Not on file    Physically Abused: Not on file    Sexually Abused: Not on file   Housing Stability:     Unable to Pay for Housing in the Last Year: Not on file    Number of [place] in the Last Year: Not on file    Unstable Housing in the Last Year: Not on file         HPI   HPI  Reported by patient. Location: abdominal   Onset/Timing: >2 weeks  Duration: throughout the day  Quality: aching; cramping; pressure; spasms/ tightening of lower abdomen/ pelvis  Severity: interferes with normal activities   Alleviating Factors: none  Aggravating Factors: none  Associated Symptoms: no back pain; no chills; no constipation; no diarrhea; no vaginal discharge; no pain with urination; normal emptying of bladder; no feelings of urgency; no blood in the urine; normal libido; no fever;       Review of Systems   Constitutional: Negative. Respiratory: Negative. Cardiovascular: Negative. Gastrointestinal: Positive for abdominal pain. Genitourinary: Negative. Musculoskeletal: Negative.     Skin: Negative. Neurological: Negative. Endo/Heme/Allergies: Negative. Psychiatric/Behavioral: Negative. All other systems reviewed and are negative. /71 (BP 1 Location: Right arm, BP Patient Position: Sitting)   Pulse 74   Resp 16   Ht 5' 4\" (1.626 m)   Wt 125 lb (56.7 kg)   LMP  (LMP Unknown)   SpO2 94%   BMI 21.46 kg/m²    OBGyn Exam   PE:  Constitutional: General Appearance: healthy-appearing, well-nourished, well-developed, and well groomed. Psychiatric: Orientation: to time, place, and person. Mood and Affect: normal mood and affect and appropriate and active and alert. Abdomen: Auscultation/Inspection/Palpation: no mass and non-distended. Hernia: none palpated. + suprapubic tenderness    Female Genitalia: Vulva: no masses, atrophy, or lesions. Bladder/Urethra: no urethral discharge or mass and normal meatus and bladder non distended. Vagina no tenderness, erythema, cystocele, rectocele, abnormal vaginal discharge, or vesicle(s) or ulcers. Cervix: no discharge or cervical motion tenderness and grossly normal.     Uterus: mobile, non-tender, and no uterine prolapse. Adnexa/Parametria: no adnexal tenderness. 1. Pelvic pain in female    - US PELV NON OBS; Future  - CULTURE, URINE;  Future

## 2022-03-24 PROBLEM — N95.2 POST-MENOPAUSAL ATROPHIC VAGINITIS: Status: ACTIVE | Noted: 2022-03-16

## 2022-03-24 PROBLEM — L90.0 LICHEN SCLEROSUS: Status: ACTIVE | Noted: 2022-03-16

## 2022-03-24 PROBLEM — E28.39 MENOPAUSE OVARIAN FAILURE: Status: ACTIVE | Noted: 2022-03-16

## 2022-03-26 NOTE — PROGRESS NOTES
Jake Llanos is a 61 y.o. female, , No LMP recorded (lmp unknown). Patient is postmenopausal., who presents today for the following:  Chief Complaint   Patient presents with    Follow-up     Pt here for u/s results. Allergies   Allergen Reactions    Ciprofloxacin Rash    Fentanyl Rash    Nitrofurantoin Rash    Nortriptyline Vertigo    Prozac [Fluoxetine] Rash    Sulfa (Sulfonamide Antibiotics) Rash    Wellbutrin [Bupropion] Rash    Zofran [Ondansetron Hcl] Other (comments)     constipation    Diprivan [Propofol] Hives and Shortness of Breath       Current Outpatient Medications   Medication Sig    nystatin (MYCOSTATIN) powder Apply  to affected area two (2) times a day.  zinc 50 mg tab tablet Take  by mouth daily.  ergocalciferol (Vitamin D2) 1,250 mcg (50,000 unit) capsule Take 50,000 Units by mouth.  vit D3/vit K2/calc frutoborate (VIT D3-VIT K2-CA FRUCTOBORATE PO) Take  by mouth.  estradioL (ESTRACE) 0.01 % (0.1 mg/gram) vaginal cream Place pea size of the cream on affected area, on Monday,,Friday    fluconazole (DIFLUCAN) 150 mg tablet Take 1 tablet now and repeat in 4 days    teriflunomide (Aubagio) 7 mg tablet Take  by mouth daily.  clobetasoL (Temovate) 0.05 % topical cream Apply  to affected area two (2) times a day.  ascorbic acid, vitamin C, (Vitamin C) 500 mg tablet Take 500 mg by mouth.  omeprazole (PRILOSEC) 40 mg capsule Take 40 mg by mouth daily.  hydrOXYzine HCL (ATARAX) 25 mg tablet Take 25 mg by mouth three (3) times daily as needed.  multivitamin, tx-iron-ca-min (THERA-M w/ IRON) 9 mg iron-400 mcg tab tablet Take 1 Tablet by mouth daily.  gabapentin (NEURONTIN) 800 mg tablet Take 800 mg by mouth three (3) times daily.  divalproex ER (Depakote ER) 250 mg ER tablet Take 500 mg by mouth two (2) times a day.  alendronate (FOSAMAX) 70 mg tablet Take 1 Tablet by mouth every seven (7) days.     carvediloL (COREG) 3.125 mg tablet Take 3.125 mg by mouth two (2) times daily (with meals).  aspirin 81 mg chewable tablet Take 1 Tab by mouth daily.  baclofen (LIORESAL) 10 mg tablet Take 10 mg by mouth three (3) times daily. 3 in the morning; 3 at noon, 3 in the evening    spironolactone (ALDACTONE) 50 mg tablet Take  by mouth daily. One at noon and 2 in the evening     No current facility-administered medications for this visit.        Past Medical History:   Diagnosis Date    Arthritis     patient states not aware of this dx    Bladder outlet obstruction     Eyad-Danlos syndrome     GERD (gastroesophageal reflux disease)     Incomplete bladder emptying     MS (multiple sclerosis) (HCC)     MVP (mitral valve prolapse)     patient states told years ago, doesnt see cardiology    Recurrent UTI     UTI (urinary tract infection)        Past Surgical History:   Procedure Laterality Date    HX CYSTOSTOMY      HX UROLOGICAL  04/14/2021    CYSTOURETHROSCOPY    HX UROLOGICAL  04/14/2021    BILATERAL  RETROGRADE PYELOGRAMS    HX UROLOGICAL  04/14/2021    URETHRAL DILATION    HX WISDOM TEETH EXTRACTION         Family History   Problem Relation Age of Onset    Hypertension Mother     Elevated Lipids Mother     Cancer Father         PROSTATE    Elevated Lipids Father        Social History     Socioeconomic History    Marital status:      Spouse name: Not on file    Number of children: Not on file    Years of education: Not on file    Highest education level: Not on file   Occupational History    Not on file   Tobacco Use    Smoking status: Never Smoker    Smokeless tobacco: Never Used   Vaping Use    Vaping Use: Never used   Substance and Sexual Activity    Alcohol use: Not Currently    Drug use: Never    Sexual activity: Yes     Partners: Male   Other Topics Concern    Not on file   Social History Narrative    Not on file     Social Determinants of Health     Financial Resource Strain:     Difficulty of Paying Living Expenses: Not on file   Food Insecurity:     Worried About [employer] [employer] in the Last Year: Not on file    [employer] in the Last Year: Not on file   Transportation Needs:     Lack of Transportation (Medical): Not on file    Lack of Transportation (Non-Medical): Not on file   Physical Activity:     Days of Exercise per Week: Not on file    Minutes of Exercise per Session: Not on file   Stress:     Feeling of Stress : Not on file   Social Connections:     Frequency of Communication with Friends and Family: Not on file    Frequency of Social Gatherings with Friends and Family: Not on file    Attends Bahai Services: Not on file    Active Member of [address] [place] [address] or Organizations: Not on file    Attends Club or Organization Meetings: Not on file    Marital Status: Not on file   Intimate Partner Violence:     Fear of Current or Ex-Partner: Not on file    Emotionally Abused: Not on file    Physically Abused: Not on file    Sexually Abused: Not on file   Housing Stability:     Unable to Pay for Housing in the Last Year: Not on file    Number of [place] in the Last Year: Not on file    Unstable Housing in the Last Year: Not on file         HPI  Patient presents for follow up  Reported by patient. Location: vagina   Quality: irritation   Severity: moderate   Duration: symptoms lasting over > 6 months   Context: history of recurrent vaginal infections / lichens  Associated Symptoms: no vaginal burning; no swelling/redness; no fever/chills; no diarrhea; no abdominal pain; no pelvic pain; no vaginal pain; no pain during urination; no pain during intercourse; no vaginal lump; no genital lesion; no sexually transmitted disease; no fever; intermittent vaginal itching     Review of Systems   Constitutional: Negative. Respiratory: Negative. Cardiovascular: Negative. Gastrointestinal: Negative. Genitourinary: Negative. Musculoskeletal: Negative. Skin: Negative.     Neurological: Negative. Endo/Heme/Allergies: Negative. Psychiatric/Behavioral: Negative. All other systems reviewed and are negative. /69 (BP 1 Location: Left upper arm, BP Patient Position: Sitting)   Pulse 79   Resp 16   Ht 5' 4\" (1.626 m)   Wt 126 lb (57.2 kg)   LMP  (LMP Unknown)   SpO2 99%   BMI 21.63 kg/m²    OBGyn Exam   PE:  Constitutional: General Appearance: healthy-appearing, well-nourished, well-developed, and well groomed. Psychiatric: Orientation: to time, place, and person. Mood and Affect: normal mood and affect and appropriate and active and alert. Abdomen: Auscultation/Inspection/Palpation: tenderness and mass and non-distended. Hernia: none palpated. Female Genitalia: Vulva: no masses, some atrophy, or lesions. Mild erythema    Bladder/Urethra: no urethral discharge or mass and normal meatus and bladder non distended. Vagina no tenderness, erythema, cystocele, rectocele, abnormal vaginal discharge, or vesicle(s) or ulcers. .     1. Vaginal discomfort    - nystatin (MYCOSTATIN) powder; Apply  to affected area two (2) times a day. Dispense: 30 g; Refill: 1    2. Chronic vulvitis    3.  Lichen sclerosus

## 2022-04-04 ENCOUNTER — TELEPHONE (OUTPATIENT)
Dept: OBGYN CLINIC | Age: 57
End: 2022-04-04

## 2022-04-04 DIAGNOSIS — N94.9 VAGINAL DISCOMFORT: ICD-10-CM

## 2022-04-04 RX ORDER — FLUCONAZOLE 150 MG/1
TABLET ORAL
Qty: 2 TABLET | Refills: 0 | Status: SHIPPED | OUTPATIENT
Start: 2022-04-04 | End: 2022-05-11

## 2022-04-04 NOTE — TELEPHONE ENCOUNTER
Returned the patient's and she states she is still very irritated and would like a refill on Diflucan. Refill submitted. Patient states at her last visit she was told by Dr Juan Jimenes she may try a butt paste for diaper rash. She will try this. She was also advised she may try Monistat OTC a few times per day.

## 2022-04-13 ENCOUNTER — OFFICE VISIT (OUTPATIENT)
Dept: OBGYN CLINIC | Age: 57
End: 2022-04-13
Payer: MEDICARE

## 2022-04-13 VITALS
BODY MASS INDEX: 21.46 KG/M2 | RESPIRATION RATE: 110 BRPM | WEIGHT: 125 LBS | SYSTOLIC BLOOD PRESSURE: 160 MMHG | DIASTOLIC BLOOD PRESSURE: 85 MMHG

## 2022-04-13 DIAGNOSIS — N76.3 CHRONIC VULVITIS: Primary | ICD-10-CM

## 2022-04-13 DIAGNOSIS — L90.0 LICHEN SCLEROSUS: ICD-10-CM

## 2022-04-13 PROCEDURE — G9899 SCRN MAM PERF RSLTS DOC: HCPCS | Performed by: OBSTETRICS & GYNECOLOGY

## 2022-04-13 PROCEDURE — G8427 DOCREV CUR MEDS BY ELIG CLIN: HCPCS | Performed by: OBSTETRICS & GYNECOLOGY

## 2022-04-13 PROCEDURE — 99213 OFFICE O/P EST LOW 20 MIN: CPT | Performed by: OBSTETRICS & GYNECOLOGY

## 2022-04-13 PROCEDURE — 3017F COLORECTAL CA SCREEN DOC REV: CPT | Performed by: OBSTETRICS & GYNECOLOGY

## 2022-04-13 PROCEDURE — G8510 SCR DEP NEG, NO PLAN REQD: HCPCS | Performed by: OBSTETRICS & GYNECOLOGY

## 2022-04-13 PROCEDURE — G8420 CALC BMI NORM PARAMETERS: HCPCS | Performed by: OBSTETRICS & GYNECOLOGY

## 2022-04-21 NOTE — PROGRESS NOTES
Malgorzata Davidson is a 61 y.o. female, , No LMP recorded (lmp unknown). Patient is postmenopausal., who presents today for the following:  Chief Complaint   Patient presents with    Follow-up     pt. states she had a flare up four weeks ago and it's still not getting any better. Allergies   Allergen Reactions    Ciprofloxacin Rash    Fentanyl Rash    Nitrofurantoin Rash    Nortriptyline Vertigo    Prozac [Fluoxetine] Rash    Sulfa (Sulfonamide Antibiotics) Rash    Wellbutrin [Bupropion] Rash    Zofran [Ondansetron Hcl] Other (comments)     constipation    Diprivan [Propofol] Hives and Shortness of Breath       Current Outpatient Medications   Medication Sig    fluconazole (DIFLUCAN) 150 mg tablet Take 1 tablet now and repeat in 4 days    nystatin (MYCOSTATIN) powder Apply  to affected area two (2) times a day.  zinc 50 mg tab tablet Take  by mouth daily.  ergocalciferol (Vitamin D2) 1,250 mcg (50,000 unit) capsule Take 50,000 Units by mouth.  vit D3/vit K2/calc frutoborate (VIT D3-VIT K2-CA FRUCTOBORATE PO) Take  by mouth.  estradioL (ESTRACE) 0.01 % (0.1 mg/gram) vaginal cream Place pea size of the cream on affected area, on Monday,,Friday    teriflunomide (Aubagio) 7 mg tablet Take  by mouth daily.  clobetasoL (Temovate) 0.05 % topical cream Apply  to affected area two (2) times a day.  ascorbic acid, vitamin C, (Vitamin C) 500 mg tablet Take 500 mg by mouth.  omeprazole (PRILOSEC) 40 mg capsule Take 40 mg by mouth daily.  hydrOXYzine HCL (ATARAX) 25 mg tablet Take 25 mg by mouth three (3) times daily as needed.  multivitamin, tx-iron-ca-min (THERA-M w/ IRON) 9 mg iron-400 mcg tab tablet Take 1 Tablet by mouth daily.  gabapentin (NEURONTIN) 800 mg tablet Take 800 mg by mouth three (3) times daily.  divalproex ER (Depakote ER) 250 mg ER tablet Take 500 mg by mouth two (2) times a day.     alendronate (FOSAMAX) 70 mg tablet Take 1 Tablet by mouth every seven (7) days.  carvediloL (COREG) 3.125 mg tablet Take 3.125 mg by mouth two (2) times daily (with meals).  aspirin 81 mg chewable tablet Take 1 Tab by mouth daily.  baclofen (LIORESAL) 10 mg tablet Take 10 mg by mouth three (3) times daily. 3 in the morning; 3 at noon, 3 in the evening    spironolactone (ALDACTONE) 50 mg tablet Take  by mouth daily. One at noon and 2 in the evening     No current facility-administered medications for this visit.        Past Medical History:   Diagnosis Date    Arthritis     patient states not aware of this dx    Bladder outlet obstruction     Eyad-Danlos syndrome     GERD (gastroesophageal reflux disease)     Incomplete bladder emptying     MS (multiple sclerosis) (HCC)     MVP (mitral valve prolapse)     patient states told years ago, doesnt see cardiology    Recurrent UTI     UTI (urinary tract infection)        Past Surgical History:   Procedure Laterality Date    HX CYSTOSTOMY      HX UROLOGICAL  04/14/2021    CYSTOURETHROSCOPY    HX UROLOGICAL  04/14/2021    BILATERAL  RETROGRADE PYELOGRAMS    HX UROLOGICAL  04/14/2021    URETHRAL DILATION    HX WISDOM TEETH EXTRACTION         Family History   Problem Relation Age of Onset    Hypertension Mother     Elevated Lipids Mother     Cancer Father         PROSTATE    Elevated Lipids Father        Social History     Socioeconomic History    Marital status:      Spouse name: Not on file    Number of children: Not on file    Years of education: Not on file    Highest education level: Not on file   Occupational History    Not on file   Tobacco Use    Smoking status: Never Smoker    Smokeless tobacco: Never Used   Vaping Use    Vaping Use: Never used   Substance and Sexual Activity    Alcohol use: Not Currently    Drug use: Never    Sexual activity: Yes     Partners: Male   Other Topics Concern    Not on file   Social History Narrative    Not on file     Social Determinants of Health Financial Resource Strain:     Difficulty of Paying Living Expenses: Not on file   Food Insecurity:     Worried About Running Out of Food in the Last Year: Not on file    [employer] Food in the Last Year: Not on file   Transportation Needs:     Lack of Transportation (Medical): Not on file    Lack of Transportation (Non-Medical): Not on file   Physical Activity:     Days of Exercise per Week: Not on file    Minutes of Exercise per Session: Not on file   Stress:     Feeling of Stress : Not on file   Social Connections:     Frequency of Communication with Friends and Family: Not on file    Frequency of Social Gatherings with Friends and Family: Not on file    Attends Zoroastrianism Services: Not on file    Active Member of [address] [place] [address] [employer] or Organizations: Not on file    Attends Club or Organization Meetings: Not on file    Marital Status: Not on file   Intimate Partner Violence:     Fear of Current or Ex-Partner: Not on file    Emotionally Abused: Not on file    Physically Abused: Not on file    Sexually Abused: Not on file   Housing Stability:     Unable to Pay for Housing in the Last Year: Not on file    Number of [place] in the Last Year: Not on file    Unstable Housing in the Last Year: Not on file         HPI   Patient presents for follow up  Reported by patient. Location: vagina   Quality: irritation   Severity: moderate   Duration: symptoms lasting over > 6 months   Context: history of recurrent vaginal infections / lichens  Associated Symptoms: no vaginal burning; no swelling/redness; no fever/chills; no diarrhea; no abdominal pain; no pelvic pain; no vaginal pain; no pain during urination; no pain during intercourse; no vaginal lump; no genital lesion; no sexually transmitted disease; no fever; intermittent vaginal itching - states there  Has been min improvement      Review of Systems   Constitutional: Negative. Respiratory: Negative. Cardiovascular: Negative.     Gastrointestinal: Negative. Genitourinary: Negative. Musculoskeletal: Negative. Skin: Negative. Neurological: Negative. Endo/Heme/Allergies: Negative. Psychiatric/Behavioral: Negative. All other systems reviewed and are negative. BP (!) 160/85 (BP 1 Location: Right upper arm, BP Patient Position: Sitting, BP Cuff Size: Small adult)   Resp (!) 110   Wt 125 lb (56.7 kg)   LMP  (LMP Unknown)   BMI 21.46 kg/m²    OBGyn Exam     Constitutional: General Appearance: healthy-appearing, well-nourished, well-developed, and well groomed.      Psychiatric: Orientation: to time, place, and person. Mood and Affect: normal mood and affect and appropriate and active and alert.      Abdomen: Auscultation/Inspection/Palpation: tenderness and mass and non-distended. Hernia: none palpated.      Female Genitalia: Vulva: no masses, some atrophy, or lesions. Mild erythema - improved from last evaluation     Bladder/Urethra: no urethral discharge or mass and normal meatus and bladder non distended.      Vagina no tenderness, erythema, cystocele, rectocele, abnormal vaginal discharge, or vesicle(s) or ulcers.          1. Chronic vulvitis  Continue current regime  Reassurance provided.     2. Lichen sclerosus

## 2022-04-29 ENCOUNTER — TELEPHONE (OUTPATIENT)
Dept: OBGYN CLINIC | Age: 57
End: 2022-04-29

## 2022-04-29 NOTE — TELEPHONE ENCOUNTER
Returned the patient's call and she states she is experiencing a flare up of her Lichen Sclerosus. She is questioning if she can use the estrogen cream and her steroid daily. Per Dr Héctor Fink the patient needs to have a biopsy done and her treatment plan will be decided once the results are available. Appt scheduled on 05/04/22.

## 2022-05-04 ENCOUNTER — OFFICE VISIT (OUTPATIENT)
Dept: OBGYN CLINIC | Age: 57
End: 2022-05-04
Payer: MEDICARE

## 2022-05-04 VITALS
HEART RATE: 76 BPM | HEIGHT: 64 IN | RESPIRATION RATE: 16 BRPM | DIASTOLIC BLOOD PRESSURE: 67 MMHG | BODY MASS INDEX: 21.34 KG/M2 | WEIGHT: 125 LBS | OXYGEN SATURATION: 97 % | SYSTOLIC BLOOD PRESSURE: 124 MMHG

## 2022-05-04 DIAGNOSIS — N76.3 CHRONIC VULVITIS: Primary | ICD-10-CM

## 2022-05-04 PROCEDURE — G9899 SCRN MAM PERF RSLTS DOC: HCPCS | Performed by: OBSTETRICS & GYNECOLOGY

## 2022-05-04 PROCEDURE — 56605 BIOPSY OF VULVA/PERINEUM: CPT | Performed by: OBSTETRICS & GYNECOLOGY

## 2022-05-04 PROCEDURE — G8420 CALC BMI NORM PARAMETERS: HCPCS | Performed by: OBSTETRICS & GYNECOLOGY

## 2022-05-04 PROCEDURE — G8427 DOCREV CUR MEDS BY ELIG CLIN: HCPCS | Performed by: OBSTETRICS & GYNECOLOGY

## 2022-05-04 PROCEDURE — 11104 PUNCH BX SKIN SINGLE LESION: CPT | Performed by: OBSTETRICS & GYNECOLOGY

## 2022-05-04 PROCEDURE — 3017F COLORECTAL CA SCREEN DOC REV: CPT | Performed by: OBSTETRICS & GYNECOLOGY

## 2022-05-04 PROCEDURE — 99213 OFFICE O/P EST LOW 20 MIN: CPT | Performed by: OBSTETRICS & GYNECOLOGY

## 2022-05-04 PROCEDURE — G8432 DEP SCR NOT DOC, RNG: HCPCS | Performed by: OBSTETRICS & GYNECOLOGY

## 2022-05-04 NOTE — PROGRESS NOTES
Veronica Booth is a 61 y.o. female, , No LMP recorded (lmp unknown). Patient is postmenopausal., who presents today for the following:  Chief Complaint   Patient presents with    Confirmation for Procedure(s)     skin biopsy        Allergies   Allergen Reactions    Ciprofloxacin Rash    Fentanyl Rash    Nitrofurantoin Rash    Nortriptyline Vertigo    Prozac [Fluoxetine] Rash    Sulfa (Sulfonamide Antibiotics) Rash    Wellbutrin [Bupropion] Rash    Zofran [Ondansetron Hcl] Other (comments)     constipation    Diprivan [Propofol] Hives and Shortness of Breath       Current Outpatient Medications   Medication Sig    nystatin (MYCOSTATIN) powder Apply  to affected area two (2) times a day.  zinc 50 mg tab tablet Take  by mouth daily.  estradioL (ESTRACE) 0.01 % (0.1 mg/gram) vaginal cream Place pea size of the cream on affected area, on Monday,,Friday    teriflunomide (Aubagio) 7 mg tablet Take  by mouth daily.  clobetasoL (Temovate) 0.05 % topical cream Apply  to affected area two (2) times a day.  ascorbic acid, vitamin C, (Vitamin C) 500 mg tablet Take 500 mg by mouth.  hydrOXYzine HCL (ATARAX) 25 mg tablet Take 25 mg by mouth three (3) times daily as needed.  multivitamin, tx-iron-ca-min (THERA-M w/ IRON) 9 mg iron-400 mcg tab tablet Take 1 Tablet by mouth daily.  gabapentin (NEURONTIN) 800 mg tablet Take 800 mg by mouth three (3) times daily.  divalproex ER (Depakote ER) 250 mg ER tablet Take 500 mg by mouth two (2) times a day.  alendronate (FOSAMAX) 70 mg tablet Take 1 Tablet by mouth every seven (7) days.  carvediloL (COREG) 3.125 mg tablet Take 3.125 mg by mouth two (2) times daily (with meals).  aspirin 81 mg chewable tablet Take 1 Tab by mouth daily.  baclofen (LIORESAL) 10 mg tablet Take 10 mg by mouth three (3) times daily. 3 in the morning; 3 at noon, 3 in the evening    spironolactone (ALDACTONE) 50 mg tablet Take  by mouth daily.  One at noon and 2 in the evening    fluconazole (DIFLUCAN) 150 mg tablet Take 1 tablet now and repeat in 4 days (Patient not taking: Reported on 5/4/2022)    ergocalciferol (Vitamin D2) 1,250 mcg (50,000 unit) capsule Take 50,000 Units by mouth.  vit D3/vit K2/calc frutoborate (VIT D3-VIT K2-CA FRUCTOBORATE PO) Take  by mouth.  omeprazole (PRILOSEC) 40 mg capsule Take 40 mg by mouth daily. No current facility-administered medications for this visit.        Past Medical History:   Diagnosis Date    Arthritis     patient states not aware of this dx    Bladder outlet obstruction     Eyad-Danlos syndrome     GERD (gastroesophageal reflux disease)     Incomplete bladder emptying     MS (multiple sclerosis) (HCC)     MVP (mitral valve prolapse)     patient states told years ago, doesnt see cardiology    Recurrent UTI     UTI (urinary tract infection)        Past Surgical History:   Procedure Laterality Date    HX CYSTOSTOMY      HX UROLOGICAL  04/14/2021    CYSTOURETHROSCOPY    HX UROLOGICAL  04/14/2021    BILATERAL  RETROGRADE PYELOGRAMS    HX UROLOGICAL  04/14/2021    URETHRAL DILATION    HX WISDOM TEETH EXTRACTION         Family History   Problem Relation Age of Onset    Hypertension Mother     Elevated Lipids Mother     Cancer Father         PROSTATE    Elevated Lipids Father        Social History     Socioeconomic History    Marital status:      Spouse name: Not on file    Number of children: Not on file    Years of education: Not on file    Highest education level: Not on file   Occupational History    Not on file   Tobacco Use    Smoking status: Never Smoker    Smokeless tobacco: Never Used   Vaping Use    Vaping Use: Never used   Substance and Sexual Activity    Alcohol use: Not Currently    Drug use: Never    Sexual activity: Yes     Partners: Male   Other Topics Concern    Not on file   Social History Narrative    Not on file     Social Determinants of Health Financial Resource Strain:     Difficulty of Paying Living Expenses: Not on file   Food Insecurity:     Worried About Running Out of Food in the Last Year: Not on file    Latonya of Food in the Last Year: Not on file   Transportation Needs:     Lack of Transportation (Medical): Not on file    Lack of Transportation (Non-Medical): Not on file   Physical Activity:     Days of Exercise per Week: Not on file    Minutes of Exercise per Session: Not on file   Stress:     Feeling of Stress : Not on file   Social Connections:     Frequency of Communication with Friends and Family: Not on file    Frequency of Social Gatherings with Friends and Family: Not on file    Attends Sabianist Services: Not on file    Active Member of [address] [place] [address] [employer] or Organizations: Not on file    Attends Club or Organization Meetings: Not on file    Marital Status: Not on file   Intimate Partner Violence:     Fear of Current or Ex-Partner: Not on file    Emotionally Abused: Not on file    Physically Abused: Not on file    Sexually Abused: Not on file   Housing Stability:     Unable to Pay for Housing in the Last Year: Not on file    Number of [place] in the Last Year: Not on file    Unstable Housing in the Last Year: Not on file         HPI  Patient with chronic vulvitis  Reports symptoms have not been improving  Presents for biopsy    Review of Systems   Constitutional: Negative. Respiratory: Negative. Cardiovascular: Negative. Gastrointestinal: Negative. Genitourinary: Negative. Musculoskeletal: Negative. Skin: Negative. Neurological: Negative. Endo/Heme/Allergies: Negative. Psychiatric/Behavioral: Negative. All other systems reviewed and are negative.          /67 (BP 1 Location: Left upper arm, BP Patient Position: Sitting)   Pulse 76   Resp 16   Ht 5' 4\" (1.626 m)   Wt 125 lb (56.7 kg)   LMP  (LMP Unknown)   SpO2 97%   BMI 21.46 kg/m²    OBGyn Exam   PE:  Constitutional: General Appearance: healthy-appearing, well-nourished, well-developed, and well groomed. Psychiatric: Orientation: to time, place, and person. Mood and Affect: normal mood and affect and appropriate and active and alert. Abdomen: Auscultation/Inspection/Palpation: tenderness and mass and non-distended. Hernia: none palpated. Female Genitalia: Vulva: thin mucosa, area of previous erythema improved. 3mm punch biopsy obtained. Patient tolerated procedure well, area hemostatic following procedure    Bladder/Urethra: no urethral discharge or mass and normal meatus and bladder non distended. Vagina no tenderness, erythema, cystocele, rectocele, abnormal vaginal discharge, or vesicle(s) or ulcers.          1. Chronic vulvitis    - SURGICAL PATHOLOGY  - BIOPSY VULVA/PERINEUM,ONE BILL

## 2022-05-09 LAB
CPT DISCLAIMER: NORMAL
DIAGNOSIS SYNOPSIS:: NORMAL
DX ICD CODE: NORMAL
PATH REPORT.COMMENTS IMP SPEC: NORMAL
PATH REPORT.FINAL DX SPEC: NORMAL
PATH REPORT.GROSS SPEC: NORMAL
PATH REPORT.RELEVANT HX SPEC: NORMAL
PATH REPORT.SITE OF ORIGIN SPEC: NORMAL
PATHOLOGIST NAME: NORMAL
PAYMENT PROCEDURE: NORMAL

## 2022-05-19 ENCOUNTER — OFFICE VISIT (OUTPATIENT)
Dept: OBGYN CLINIC | Age: 57
End: 2022-05-19
Payer: MEDICARE

## 2022-05-19 VITALS
HEIGHT: 64 IN | WEIGHT: 128 LBS | BODY MASS INDEX: 21.85 KG/M2 | DIASTOLIC BLOOD PRESSURE: 62 MMHG | SYSTOLIC BLOOD PRESSURE: 108 MMHG

## 2022-05-19 DIAGNOSIS — Z12.11 COLON CANCER SCREENING: ICD-10-CM

## 2022-05-19 DIAGNOSIS — N76.3 CHRONIC VULVITIS: Primary | ICD-10-CM

## 2022-05-19 PROCEDURE — G8420 CALC BMI NORM PARAMETERS: HCPCS | Performed by: OBSTETRICS & GYNECOLOGY

## 2022-05-19 PROCEDURE — G8432 DEP SCR NOT DOC, RNG: HCPCS | Performed by: OBSTETRICS & GYNECOLOGY

## 2022-05-19 PROCEDURE — G8427 DOCREV CUR MEDS BY ELIG CLIN: HCPCS | Performed by: OBSTETRICS & GYNECOLOGY

## 2022-05-19 PROCEDURE — G9899 SCRN MAM PERF RSLTS DOC: HCPCS | Performed by: OBSTETRICS & GYNECOLOGY

## 2022-05-19 PROCEDURE — 3017F COLORECTAL CA SCREEN DOC REV: CPT | Performed by: OBSTETRICS & GYNECOLOGY

## 2022-05-19 PROCEDURE — 99214 OFFICE O/P EST MOD 30 MIN: CPT | Performed by: OBSTETRICS & GYNECOLOGY

## 2022-05-29 NOTE — PROGRESS NOTES
Sara Castro is a 61 y.o. female, , No LMP recorded (lmp unknown). Patient is postmenopausal., who presents today for the following:  Chief Complaint   Patient presents with    Follow-up        Allergies   Allergen Reactions    Ciprofloxacin Rash    Fentanyl Rash    Nitrofurantoin Rash    Nortriptyline Vertigo    Prozac [Fluoxetine] Rash    Sulfa (Sulfonamide Antibiotics) Rash    Wellbutrin [Bupropion] Rash    Zofran [Ondansetron Hcl] Other (comments)     constipation    Diprivan [Propofol] Hives and Shortness of Breath       Current Outpatient Medications   Medication Sig    nystatin (MYCOSTATIN) powder Apply  to affected area two (2) times a day.  zinc 50 mg tab tablet Take  by mouth daily.  estradioL (ESTRACE) 0.01 % (0.1 mg/gram) vaginal cream Place pea size of the cream on affected area, on Monday,,Friday    teriflunomide (Aubagio) 7 mg tablet Take  by mouth daily.  clobetasoL (Temovate) 0.05 % topical cream Apply  to affected area two (2) times a day.  ascorbic acid, vitamin C, (Vitamin C) 500 mg tablet Take 500 mg by mouth.  omeprazole (PRILOSEC) 40 mg capsule Take 40 mg by mouth daily.  hydrOXYzine HCL (ATARAX) 25 mg tablet Take 25 mg by mouth three (3) times daily as needed.  multivitamin, tx-iron-ca-min (THERA-M w/ IRON) 9 mg iron-400 mcg tab tablet Take 1 Tablet by mouth daily.  gabapentin (NEURONTIN) 800 mg tablet Take 800 mg by mouth three (3) times daily.  divalproex ER (Depakote ER) 250 mg ER tablet Take 500 mg by mouth two (2) times a day.  alendronate (FOSAMAX) 70 mg tablet Take 1 Tablet by mouth every seven (7) days.  carvediloL (COREG) 3.125 mg tablet Take 3.125 mg by mouth two (2) times daily (with meals).  aspirin 81 mg chewable tablet Take 1 Tab by mouth daily.  baclofen (LIORESAL) 10 mg tablet Take 10 mg by mouth three (3) times daily.  3 in the morning; 3 at noon, 3 in the evening    spironolactone (ALDACTONE) 50 mg tablet Take  by mouth daily. One at noon and 2 in the evening     No current facility-administered medications for this visit. Past Medical History:   Diagnosis Date    Arthritis     patient states not aware of this dx    Bladder outlet obstruction     Eyad-Danlos syndrome     GERD (gastroesophageal reflux disease)     Incomplete bladder emptying     MS (multiple sclerosis) (HCC)     MVP (mitral valve prolapse)     patient states told years ago, doesnt see cardiology    Recurrent UTI     UTI (urinary tract infection)        Past Surgical History:   Procedure Laterality Date    HX CYSTOSTOMY      HX UROLOGICAL  04/14/2021    CYSTOURETHROSCOPY    HX UROLOGICAL  04/14/2021    BILATERAL  RETROGRADE PYELOGRAMS    HX UROLOGICAL  04/14/2021    URETHRAL DILATION    HX WISDOM TEETH EXTRACTION         Family History   Problem Relation Age of Onset    Hypertension Mother     Elevated Lipids Mother     Cancer Father         PROSTATE    Elevated Lipids Father        Social History     Socioeconomic History    Marital status:      Spouse name: Not on file    Number of children: Not on file    Years of education: Not on file    Highest education level: Not on file   Occupational History    Not on file   Tobacco Use    Smoking status: Never Smoker    Smokeless tobacco: Never Used   Vaping Use    Vaping Use: Never used   Substance and Sexual Activity    Alcohol use: Not Currently    Drug use: Never    Sexual activity: Yes     Partners: Male   Other Topics Concern    Not on file   Social History Narrative    Not on file     Social Determinants of Health     Financial Resource Strain:     Difficulty of Paying Living Expenses: Not on file   Food Insecurity:     Worried About Running Out of Food in the Last Year: Not on file    Latonya of Food in the Last Year: Not on file   Transportation Needs:     Lack of Transportation (Medical): Not on file    Lack of Transportation (Non-Medical):  Not on file   Physical Activity:     Days of Exercise per Week: Not on file    Minutes of Exercise per Session: Not on file   Stress:     Feeling of Stress : Not on file   Social Connections:     Frequency of Communication with Friends and Family: Not on file    Frequency of Social Gatherings with Friends and Family: Not on file    Attends Sikhism Services: Not on file    Active Member of [address] [place] [address] or Organizations: Not on file    Attends Club or Organization Meetings: Not on file    Marital Status: Not on file   Intimate Partner Violence:     Fear of Current or Ex-Partner: Not on file    Emotionally Abused: Not on file    Physically Abused: Not on file    Sexually Abused: Not on file   Housing Stability:     Unable to Pay for Housing in the Last Year: Not on file    Number of [place] in the Last Year: Not on file    Unstable Housing in the Last Year: Not on file         HPI   Presents for follow up  S/p vulvar biopsy  Hx of chronic vulvitis  Reports symptoms have improved  Pathology reveal BENIGN VULVAR TISSUE. NO   DYSPLASIA. Results reviewed and discussed with patient      Review of Systems   Constitutional: Negative. Respiratory: Negative. Cardiovascular: Negative. Gastrointestinal: Negative. Genitourinary: Negative. Musculoskeletal: Negative. Skin: Negative. Neurological: Negative. Endo/Heme/Allergies: Negative. Psychiatric/Behavioral: Negative. All other systems reviewed and are negative. /62 (BP 1 Location: Right arm, BP Patient Position: Sitting)   Ht 5' 4\" (1.626 m)   Wt 128 lb (58.1 kg)   LMP  (LMP Unknown)   BMI 21.97 kg/m²    OBGyn Exam     Constitutional: General Appearance: healthy-appearing, well-nourished, well-developed, and well groomed.      Psychiatric: Orientation: to time, place, and person. Mood and Affect: normal mood and affect and appropriate and active and alert.      Abdomen:  Auscultation/Inspection/Palpation: tenderness and mass and non-distended. Hernia: none palpated.      Female Genitalia: Vulva: thin mucosa, area of previous erythema improved. Biopsy site healed    1. Chronic vulvitis  reassurance    2.  Colon cancer screening    - REFERRAL TO GENERAL SURGERY

## 2022-07-06 ENCOUNTER — TELEPHONE (OUTPATIENT)
Dept: SURGERY | Age: 57
End: 2022-07-06

## 2022-07-06 NOTE — TELEPHONE ENCOUNTER
Patient call today. Patient will need to reschedule her procedure for colonoscopy.  Give her a call back at [phone]

## 2022-07-15 NOTE — TELEPHONE ENCOUNTER
Spoke with  and he states that Ms. Marisela Santacruz is not home right now but he will let her know I called.

## 2022-10-10 ENCOUNTER — ANESTHESIA (OUTPATIENT)
Dept: ENDOSCOPY | Age: 57
End: 2022-10-10
Payer: MEDICARE

## 2022-10-10 ENCOUNTER — HOSPITAL ENCOUNTER (OUTPATIENT)
Age: 57
Discharge: HOME | End: 2022-10-10
Attending: INTERNAL MEDICINE | Admitting: INTERNAL MEDICINE
Payer: MEDICARE

## 2022-10-10 ENCOUNTER — APPOINTMENT (OUTPATIENT)
Dept: ENDOSCOPY | Age: 57
End: 2022-10-10
Attending: INTERNAL MEDICINE
Payer: MEDICARE

## 2022-10-10 ENCOUNTER — ANESTHESIA EVENT (OUTPATIENT)
Dept: ENDOSCOPY | Age: 57
End: 2022-10-10
Payer: MEDICARE

## 2022-10-10 VITALS
SYSTOLIC BLOOD PRESSURE: 124 MMHG | OXYGEN SATURATION: 100 % | DIASTOLIC BLOOD PRESSURE: 59 MMHG | RESPIRATION RATE: 16 BRPM | TEMPERATURE: 98 F | HEART RATE: 71 BPM

## 2022-10-10 PROBLEM — Z12.11 COLON CANCER SCREENING: Status: ACTIVE | Noted: 2022-10-10

## 2022-10-10 PROCEDURE — 76060000032 HC ANESTHESIA 0.5 TO 1 HR: Performed by: INTERNAL MEDICINE

## 2022-10-10 PROCEDURE — 2709999900 HC NON-CHARGEABLE SUPPLY: Performed by: INTERNAL MEDICINE

## 2022-10-10 PROCEDURE — 74011250636 HC RX REV CODE- 250/636: Performed by: NURSE PRACTITIONER

## 2022-10-10 PROCEDURE — 77030014243 HC BND LIG VRCES BSC -D: Performed by: INTERNAL MEDICINE

## 2022-10-10 PROCEDURE — 76040000007: Performed by: INTERNAL MEDICINE

## 2022-10-10 RX ORDER — SODIUM CHLORIDE, SODIUM LACTATE, POTASSIUM CHLORIDE, CALCIUM CHLORIDE 600; 310; 30; 20 MG/100ML; MG/100ML; MG/100ML; MG/100ML
25 INJECTION, SOLUTION INTRAVENOUS CONTINUOUS
Status: DISCONTINUED | OUTPATIENT
Start: 2022-10-10 | End: 2022-10-10 | Stop reason: HOSPADM

## 2022-10-10 RX ORDER — FENTANYL CITRATE 50 UG/ML
INJECTION, SOLUTION INTRAMUSCULAR; INTRAVENOUS AS NEEDED
Status: DISCONTINUED | OUTPATIENT
Start: 2022-10-10 | End: 2022-10-10 | Stop reason: HOSPADM

## 2022-10-10 RX ORDER — PROPOFOL 10 MG/ML
INJECTION, EMULSION INTRAVENOUS AS NEEDED
Status: DISCONTINUED | OUTPATIENT
Start: 2022-10-10 | End: 2022-10-10 | Stop reason: HOSPADM

## 2022-10-10 RX ORDER — SODIUM CHLORIDE, SODIUM LACTATE, POTASSIUM CHLORIDE, CALCIUM CHLORIDE 600; 310; 30; 20 MG/100ML; MG/100ML; MG/100ML; MG/100ML
INJECTION, SOLUTION INTRAVENOUS
Status: DISCONTINUED | OUTPATIENT
Start: 2022-10-10 | End: 2022-10-10 | Stop reason: HOSPADM

## 2022-10-10 RX ADMIN — PROPOFOL 30 MG: 10 INJECTION, EMULSION INTRAVENOUS at 13:39

## 2022-10-10 RX ADMIN — PROPOFOL 30 MG: 10 INJECTION, EMULSION INTRAVENOUS at 13:50

## 2022-10-10 RX ADMIN — PROPOFOL 50 MG: 10 INJECTION, EMULSION INTRAVENOUS at 13:36

## 2022-10-10 RX ADMIN — SODIUM CHLORIDE, POTASSIUM CHLORIDE, SODIUM LACTATE AND CALCIUM CHLORIDE: 600; 310; 30; 20 INJECTION, SOLUTION INTRAVENOUS at 13:26

## 2022-10-10 RX ADMIN — PROPOFOL 40 MG: 10 INJECTION, EMULSION INTRAVENOUS at 13:41

## 2022-10-10 RX ADMIN — PROPOFOL 20 MG: 10 INJECTION, EMULSION INTRAVENOUS at 13:44

## 2022-10-10 RX ADMIN — FENTANYL CITRATE 25 MCG: 50 INJECTION, SOLUTION INTRAMUSCULAR; INTRAVENOUS at 13:50

## 2022-10-10 RX ADMIN — PROPOFOL 20 MG: 10 INJECTION, EMULSION INTRAVENOUS at 13:38

## 2022-10-10 RX ADMIN — FENTANYL CITRATE 25 MCG: 50 INJECTION, SOLUTION INTRAMUSCULAR; INTRAVENOUS at 13:46

## 2022-10-10 RX ADMIN — PROPOFOL 20 MG: 10 INJECTION, EMULSION INTRAVENOUS at 13:48

## 2022-10-10 RX ADMIN — PROPOFOL 20 MG: 10 INJECTION, EMULSION INTRAVENOUS at 13:43

## 2022-10-10 NOTE — ANESTHESIA POSTPROCEDURE EVALUATION
Procedure(s):  COLONOSCOPY  HEMORRHOIDECTOMY BANDING. MAC, total IV anesthesia    Anesthesia Post Evaluation      Multimodal analgesia: multimodal analgesia not used between 6 hours prior to anesthesia start to PACU discharge  Patient location during evaluation: bedside (Endoscopy suite)  Patient participation: complete - patient cannot participate  Level of consciousness: sleepy but conscious  Pain score: 0  Pain management: adequate  Airway patency: patent  Anesthetic complications: no  Cardiovascular status: acceptable  Respiratory status: acceptable and nasal cannula  Hydration status: acceptable  Comments: This patient remained on the stretcher. The patient was handed off to the endoscopy nursing team.  All questions regarding pre-, intra-, and postoperative care were answered. Post anesthesia nausea and vomiting:  none      INITIAL Post-op Vital signs: No vitals data found for the desired time range.

## 2022-10-10 NOTE — ANESTHESIA PREPROCEDURE EVALUATION
Relevant Problems   No relevant active problems       Anesthetic History               Review of Systems / Medical History      Pulmonary              Pertinent negatives: No smoker     Neuro/Psych         Neuromuscular disease    Comments: Multiple Sclerosis    Eyad-Danlos syndrome Cardiovascular    Hypertension  Valvular problems/murmurs: tricuspid insufficiency and mitral insufficiency        Past MI and CAD      Comments:  LV: Estimated LVEF is >70%. Normal cavity size, wall thickness, systolic function (ejection fraction normal) and diastolic function. Wall motion: normal.   MV: Mild mitral valve regurgitation is present. TV: Mild tricuspid valve regurgitation is present.     Normal sinus rhythm   Anterior infarct , age undetermined   Abnormal ECG   When compared with ECG of 22-JUL-2021 10:48,   T wave inversion now evident in Anterior leads    GI/Hepatic/Renal     GERD           Endo/Other             Other Findings            Past Medical History:   Diagnosis Date    Arthritis     patient states not aware of this dx    Bladder outlet obstruction     Eyad-Danlos syndrome     GERD (gastroesophageal reflux disease)     Incomplete bladder emptying     MS (multiple sclerosis) (HCC)     MVP (mitral valve prolapse)     patient states told years ago, doesnt see cardiology    Recurrent UTI     UTI (urinary tract infection)        Past Surgical History:   Procedure Laterality Date    HX CYSTOSTOMY      HX UROLOGICAL  04/14/2021    CYSTOURETHROSCOPY    HX UROLOGICAL  04/14/2021    BILATERAL  RETROGRADE PYELOGRAMS    HX UROLOGICAL  04/14/2021    URETHRAL DILATION    HX WISDOM TEETH EXTRACTION         Current Outpatient Medications   Medication Instructions    alendronate (FOSAMAX) 70 mg, Oral, EVERY 7 DAYS    ascorbic acid (vitamin C) (VITAMIN C) 500 mg, Oral    aspirin 81 mg, Oral, DAILY    baclofen (LIORESAL) 10 mg, Oral, 3 TIMES DAILY, 3 in the morning; 3 at noon, 3 in the evening     carvediloL (COREG) 3.125 mg, Oral, 2 TIMES DAILY WITH MEALS    clobetasoL (Temovate) 0.05 % topical cream Topical, 2 TIMES DAILY    divalproex ER (DEPAKOTE ER) 500 mg, Oral, 2 TIMES DAILY    estradioL (ESTRACE) 0.01 % (0.1 mg/gram) vaginal cream Place pea size of the cream on affected area, on Monday,Wensday,Friday    gabapentin (NEURONTIN) 800 mg, Oral, 3 TIMES DAILY    hydrOXYzine HCL (ATARAX) 25 mg, Oral, 3 TIMES DAILY AS NEEDED    multivitamin, tx-iron-ca-min (THERA-M w/ IRON) 9 mg iron-400 mcg tab tablet 1 Tablet, Oral, DAILY    nystatin (MYCOSTATIN) powder Topical, 2 TIMES DAILY    omeprazole (PRILOSEC) 40 mg, Oral, DAILY    spironolactone (ALDACTONE) 50 mg tablet Oral, DAILY, One at noon and 2 in the evening     teriflunomide (Aubagio) 7 mg tablet Oral, DAILY    zinc 50 mg tab tablet Oral, DAILY       No current facility-administered medications for this encounter. Current Outpatient Medications   Medication Sig    nystatin (MYCOSTATIN) powder Apply  to affected area two (2) times a day.  zinc 50 mg tab tablet Take  by mouth daily.  estradioL (ESTRACE) 0.01 % (0.1 mg/gram) vaginal cream Place pea size of the cream on affected area, on Monday,Wensday,Friday    teriflunomide (Aubagio) 7 mg tablet Take  by mouth daily.  clobetasoL (Temovate) 0.05 % topical cream Apply  to affected area two (2) times a day.  ascorbic acid, vitamin C, (Vitamin C) 500 mg tablet Take 500 mg by mouth.  omeprazole (PRILOSEC) 40 mg capsule Take 40 mg by mouth daily.  hydrOXYzine HCL (ATARAX) 25 mg tablet Take 25 mg by mouth three (3) times daily as needed.  multivitamin, tx-iron-ca-min (THERA-M w/ IRON) 9 mg iron-400 mcg tab tablet Take 1 Tablet by mouth daily.  gabapentin (NEURONTIN) 800 mg tablet Take 800 mg by mouth three (3) times daily.  divalproex ER (Depakote ER) 250 mg ER tablet Take 500 mg by mouth two (2) times a day.     alendronate (FOSAMAX) 70 mg tablet Take 1 Tablet by mouth every seven (7) days.  carvediloL (COREG) 3.125 mg tablet Take 3.125 mg by mouth two (2) times daily (with meals).  aspirin 81 mg chewable tablet Take 1 Tab by mouth daily.  baclofen (LIORESAL) 10 mg tablet Take 10 mg by mouth three (3) times daily. 3 in the morning; 3 at noon, 3 in the evening    spironolactone (ALDACTONE) 50 mg tablet Take  by mouth daily. One at noon and 2 in the evening       No data found.     Lab Results   Component Value Date/Time    WBC 6.8 02/12/2022 08:15 AM    HGB 14.7 02/12/2022 08:15 AM    HCT 42.7 02/12/2022 08:15 AM    PLATELET 996 04/93/0460 08:15 AM    MCV 91.0 02/12/2022 08:15 AM     Lab Results   Component Value Date/Time    Sodium 139 02/12/2022 08:15 AM    Potassium 4.1 02/12/2022 08:15 AM    Chloride 100 02/12/2022 08:15 AM    CO2 31 02/12/2022 08:15 AM    Anion gap 8 02/12/2022 08:15 AM    Glucose 106 (H) 02/12/2022 08:15 AM    BUN 19 02/12/2022 08:15 AM    Creatinine 0.86 02/12/2022 08:15 AM    BUN/Creatinine ratio 22 (H) 02/12/2022 08:15 AM    GFR est AA >60 02/12/2022 08:15 AM    GFR est non-AA >60 02/12/2022 08:15 AM    Calcium 9.6 02/12/2022 08:15 AM     No results found for: APTT, PTP, INR, INREXT  Lab Results   Component Value Date/Time    Glucose 106 (H) 02/12/2022 08:15 AM         Physical Exam    Airway  Mallampati: II    Neck ROM: normal range of motion        Cardiovascular    Rhythm: regular  Rate: normal         Dental         Pulmonary  Breath sounds clear to auscultation               Abdominal         Other Findings            Anesthetic Plan    ASA: 3  Anesthesia type: MAC and total IV anesthesia    Monitoring Plan: Continuous noninvasive hemodynamic monitoring      Induction: Intravenous  Anesthetic plan and risks discussed with: Patient

## 2022-10-10 NOTE — DISCHARGE INSTRUCTIONS
Stool softner as needed, hemorrhoidal gel as ordered. Avoid all NSAIDs until further notice. Any issue with procedure, such as pain, bleeding, fever, please call my office at daytime or call hospital to page me at [phone] or go to ER at other time.

## 2022-10-10 NOTE — PERIOP NOTES
Patient alert and oriented x4, VS stable, no complaints of pain at this time, just some discomfort. Sister at bedside. Discharge instructions/education provided to sister, Elizabeth Corral, she verbalized understanding and had no questions. Patient discharged ambulatory with assistance to main entrance of hospital with sister to home via private vehicle.

## 2022-11-09 PROBLEM — Z12.11 COLON CANCER SCREENING: Status: RESOLVED | Noted: 2022-10-10 | Resolved: 2022-11-09

## 2023-01-30 ENCOUNTER — TELEPHONE (OUTPATIENT)
Dept: OBGYN CLINIC | Age: 58
End: 2023-01-30

## 2023-01-30 DIAGNOSIS — M85.80 OSTEOPENIA, UNSPECIFIED LOCATION: Primary | ICD-10-CM

## 2023-01-30 RX ORDER — ALENDRONATE SODIUM 70 MG/1
70 TABLET ORAL
Qty: 12 TABLET | Refills: 0 | Status: SHIPPED | OUTPATIENT
Start: 2023-01-30

## 2023-02-13 ENCOUNTER — HOSPITAL ENCOUNTER (EMERGENCY)
Age: 58
Discharge: HOME | End: 2023-02-13
Attending: STUDENT IN AN ORGANIZED HEALTH CARE EDUCATION/TRAINING PROGRAM
Payer: MEDICARE

## 2023-02-13 ENCOUNTER — APPOINTMENT (OUTPATIENT)
Dept: GENERAL RADIOLOGY | Age: 58
End: 2023-02-13
Attending: STUDENT IN AN ORGANIZED HEALTH CARE EDUCATION/TRAINING PROGRAM
Payer: MEDICARE

## 2023-02-13 VITALS
DIASTOLIC BLOOD PRESSURE: 70 MMHG | SYSTOLIC BLOOD PRESSURE: 141 MMHG | BODY MASS INDEX: 22.2 KG/M2 | RESPIRATION RATE: 16 BRPM | OXYGEN SATURATION: 98 % | WEIGHT: 130 LBS | HEART RATE: 80 BPM | TEMPERATURE: 98.5 F | HEIGHT: 64 IN

## 2023-02-13 DIAGNOSIS — M54.50 ACUTE LEFT-SIDED LOW BACK PAIN WITHOUT SCIATICA: Primary | ICD-10-CM

## 2023-02-13 PROCEDURE — 72100 X-RAY EXAM L-S SPINE 2/3 VWS: CPT

## 2023-02-13 PROCEDURE — 99283 EMERGENCY DEPT VISIT LOW MDM: CPT

## 2023-02-13 RX ORDER — ACETAMINOPHEN 500 MG
1000 TABLET ORAL
Qty: 40 TABLET | Refills: 0 | Status: SHIPPED | OUTPATIENT
Start: 2023-02-13

## 2023-02-13 RX ORDER — ACETAMINOPHEN 500 MG
1000 TABLET ORAL ONCE
Status: DISCONTINUED | OUTPATIENT
Start: 2023-02-13 | End: 2023-02-13 | Stop reason: HOSPADM

## 2023-02-13 RX ORDER — CYCLOBENZAPRINE HCL 10 MG
10 TABLET ORAL
Qty: 15 TABLET | Refills: 0 | Status: SHIPPED | OUTPATIENT
Start: 2023-02-13

## 2023-02-13 RX ORDER — KETOROLAC TROMETHAMINE 15 MG/ML
15 INJECTION, SOLUTION INTRAMUSCULAR; INTRAVENOUS
Status: DISCONTINUED | OUTPATIENT
Start: 2023-02-13 | End: 2023-02-13 | Stop reason: HOSPADM

## 2023-02-13 NOTE — ED TRIAGE NOTES
Pt has been having pain in lower back and down the left leg for about a month. Keeps getting worse. Mostly stops at the calf.

## 2023-02-13 NOTE — ED PROVIDER NOTES
[id number]  EMERGENCY DEPARTMENT ENCOUNTER NOTE        Date: 2/13/2023  Patient Name: Bailey Chavez      History of Presenting Illness     Chief Complaint   Patient presents with    LOW BACK PAIN    Leg Pain       History Provided By: Patient    HPI: Bailey Chavez, 60 y.o. female with PMH and home medications as below comes to the ED with back pain. Back pain has been going on for a month and radiating to the left lower leg, nothing specific makes it better or worse. Usually it is worse when she is getting out of bed or standing up after prolonged sitting. He is up as she moves. There is no direct trauma to the back, unexplained weight loss, new neurological symptoms (numbness, weakness, sphincteric incontinence or retention, saddle anesthesia), fever, IVDU, steroid use, or history of cancer. PCP: Brenda Portillo MD    Current Facility-Administered Medications   Medication Dose Route Frequency Provider Last Rate Last Admin    ketorolac (TORADOL) injection 15 mg  15 mg IntraMUSCular NOW Santa Delatorre MD        acetaminophen (TYLENOL) tablet 1,000 mg  1,000 mg Oral ONCE Santa Delatorre MD         Current Outpatient Medications   Medication Sig Dispense Refill    cyclobenzaprine (FLEXERIL) 10 mg tablet Take 1 Tablet by mouth three (3) times daily as needed for Muscle Spasm(s). 15 Tablet 0    acetaminophen (TYLENOL) 500 mg tablet Take 2 Tablets by mouth every six (6) hours as needed for Pain. 40 Tablet 0    alendronate (FOSAMAX) 70 mg tablet Take 1 Tablet by mouth every seven (7) days. 12 Tablet 0    estradioL (ESTRACE) 0.01 % (0.1 mg/gram) vaginal cream Place pea size of the cream on affected area, on Monday,Wensday,Friday 42.5 g 4    teriflunomide (AUBAGIO) 7 mg tablet Take 14 mg by mouth daily. ascorbic acid, vitamin C, (VITAMIN C) 500 mg tablet Take 500 mg by mouth. omeprazole (PRILOSEC) 40 mg capsule Take 40 mg by mouth daily.       hydrOXYzine HCL (ATARAX) 25 mg tablet Take 25 mg by mouth three (3) times daily as needed. gabapentin (NEURONTIN) 800 mg tablet Take 800 mg by mouth three (3) times daily. divalproex ER (DEPAKOTE ER) 250 mg ER tablet Take 500 mg by mouth two (2) times a day. carvediloL (COREG) 3.125 mg tablet Take 3.125 mg by mouth two (2) times daily (with meals). aspirin 81 mg chewable tablet Take 1 Tab by mouth daily. 30 Tab 0    baclofen (LIORESAL) 10 mg tablet Take 10 mg by mouth three (3) times daily. 3 in the morning; 3 at noon, 3 in the evening      spironolactone (ALDACTONE) 50 mg tablet Take  by mouth daily. One at noon and 2 in the evening         Past History     Past Medical History:  Past Medical History:   Diagnosis Date    Arthritis     patient states not aware of this dx    Bladder outlet obstruction     Eyad-Danlos syndrome     GERD (gastroesophageal reflux disease)     Incomplete bladder emptying     MS (multiple sclerosis) (HCC)     MVP (mitral valve prolapse)     patient states told years ago, doesnt see cardiology    Recurrent UTI     UTI (urinary tract infection)        Past Surgical History:  Past Surgical History:   Procedure Laterality Date    COLONOSCOPY N/A 10/10/2022    COLONOSCOPY performed by Harshal Washburn MD at [facility] ENDOSCOPY    HX CYSTOSTOMY      HX UROLOGICAL  04/14/2021    CYSTOURETHROSCOPY    HX UROLOGICAL  04/14/2021    BILATERAL  RETROGRADE PYELOGRAMS    HX UROLOGICAL  04/14/2021    URETHRAL DILATION    HX WISDOM TEETH EXTRACTION         Family History:  Family History   Problem Relation Age of Onset    Hypertension Mother     Elevated Lipids Mother     Cancer Father         PROSTATE    Elevated Lipids Father        Social History:  Social History     Tobacco Use    Smoking status: Never    Smokeless tobacco: Never   Vaping Use    Vaping Use: Never used   Substance Use Topics    Alcohol use: Not Currently    Drug use: Never       Allergies:   Allergies   Allergen Reactions    Ciprofloxacin Rash    Nitrofurantoin Rash    Nortriptyline Vertigo    Prozac [Fluoxetine] Rash    Sulfa (Sulfonamide Antibiotics) Rash    Wellbutrin [Bupropion] Rash    Zofran [Ondansetron Hcl] Other (comments)     constipation         Review of Systems     Review of Systems    A 10 point review of system was performed and was negative except as noted above in HPI    Physical Exam     Physical Exam  Vitals and nursing note reviewed. Constitutional:       General: She is not in acute distress. Appearance: Normal appearance. She is not ill-appearing, toxic-appearing or diaphoretic. HENT:      Head: Normocephalic and atraumatic. Eyes:      Extraocular Movements: Extraocular movements intact. Conjunctiva/sclera: Conjunctivae normal.   Cardiovascular:      Rate and Rhythm: Normal rate and regular rhythm. Pulmonary:      Effort: Pulmonary effort is normal. No respiratory distress. Musculoskeletal:      Thoracic back: Normal.      Lumbar back: Tenderness present. No swelling, edema, deformity, signs of trauma, lacerations, spasms or bony tenderness. Normal range of motion. Negative right straight leg raise test and negative left straight leg raise test. No scoliosis. Neurological:      Mental Status: She is alert. Sensory: Sensation is intact. Motor: Motor function is intact. Gait: Gait is intact. Lab and Diagnostic Study Results     Labs -   No results found for this or any previous visit (from the past 12 hour(s)). Radiologic Studies -   [unfilled]  CT Results  (Last 48 hours)      None          CXR Results  (Last 48 hours)      None            Medical Decision Making and ED Course   - I am the first and primary provider for this patient AND AM THE PRIMARY PROVIDER OF RECORD. - I reviewed the vital signs, available nursing notes, past medical history, past surgical history, family history and social history. - Initial assessment performed.  The patients presenting problems have been discussed, and the staff are in agreement with the care plan formulated and outlined with them. I have encouraged them to ask questions as they arise throughout their visit. Vital Signs-Reviewed the patient's vital signs. Patient Vitals for the past 24 hrs:   Temp Pulse Resp BP SpO2   02/13/23 0806 98.5 °F (36.9 °C) 80 16 (!) 141/70 98 %       Records Reviewed: Prior medical records and Nursing notes      Medical Decision Making     The patient presents with low back injury without evidence for a more malignant injury. Given the patient's presentation and physical exam findings, in addition to the patient's age/health I feel that advanced imaging and workup wasn't warranted at this time. The patient has significant muscular tenderness to palpation of the back as mentioned in the physical exam. The patient had no significant muscular weakness, and there were no other significant neurological findings one exam.    There is no direct trauma to the back, unexplained weight loss, new neurological symptoms (numbness, weakness, sphincteric incontinence or retention, saddle anesthesia), fever, IVDU, steroid use, or history of cancer. Offered the patient pain medication in the ED and she declined. She wants to wait till she gets home to take medications. I instructed the patient that back pain of this nature will take time to improve, but it often does. I did discharge the patient with oral pain medications. Patient was given instructions to return to the emergency department or call primary physician with any worsening symptoms including but not limited to: numbness or weakness in the legs, bowel or bladder problems, numbness in the groin. Patient was also instructed to return or call with any worsening symptomatology or questions. After completion of workup and discussion of results and diagnoses with the patient, all the patient's questions were answered.  If required, all follow up appointments and treatments were discussed and explained. The patient sounded understanding and agrees with the plan. The patient understands that at this time there is no evidence for a more malignant underlying process, but the patient also understands that early in the process of an illness, an emergency department workup can be falsely reassuring. Routine discharge counseling was given to the patient and the patient understands that worsening, changing or persistent symptoms should prompt an immediate call or follow up with their primary physician or the emergency department. The importance of appropriate follow up was also discussed with the patient. More extensive discharge instructions were given in the patient's discharge paperwork. Diagnosis     Clinical Impression:   1. Acute left-sided low back pain without sciatica        Disposition     Disposition: Condition stable  DC- Adult Discharges: All of the diagnostic tests were reviewed and questions answered. Diagnosis, care plan and treatment options were discussed. The patient understands the instructions and will follow up as directed. The patients results have been reviewed with them. They have been counseled regarding their diagnosis. The patient verbally convey understanding and agreement of the signs, symptoms, diagnosis, treatment and prognosis and additionally agrees to follow up as recommended with their PCP in 24 - 48 hours. They also agree with the care-plan and convey that all of their questions have been answered. I have also put together some discharge instructions for them that include: 1) educational information regarding their diagnosis, 2) how to care for their diagnosis at home, as well a 3) list of reasons why they would want to return to the ED prior to their follow-up appointment, should their condition change. Discharged      DISCHARGE PLAN:  1.    Follow-up Information       Follow up With Specialties Details Why Contact Info [address] [facility] Emergency Medicine Go to  As needed, If symptoms worsen [address] [facility] [id number] [phone]    Sravani Jaime MD Family Medicine Schedule an appointment as soon as possible for a visit in 3 days For reevaluation, Discuss your visit to the [address]  [phone]            2. Return to ED if worse   3. Discharge Medication List as of 2/13/2023 10:12 AM        START taking these medications    Details   cyclobenzaprine (FLEXERIL) 10 mg tablet Take 1 Tablet by mouth three (3) times daily as needed for Muscle Spasm(s). , Normal, Disp-15 Tablet, R-0      acetaminophen (TYLENOL) 500 mg tablet Take 2 Tablets by mouth every six (6) hours as needed for Pain., Normal, Disp-40 Tablet, R-0           CONTINUE these medications which have NOT CHANGED    Details   alendronate (FOSAMAX) 70 mg tablet Take 1 Tablet by mouth every seven (7) days. , Normal, Disp-12 Tablet, R-0      estradioL (ESTRACE) 0.01 % (0.1 mg/gram) vaginal cream Place pea size of the cream on affected area, on Monday,Wensday,Friday, Print, Disp-42.5 g, R-4      teriflunomide (AUBAGIO) 7 mg tablet Take 14 mg by mouth daily. , Historical Med      ascorbic acid, vitamin C, (VITAMIN C) 500 mg tablet Take 500 mg by mouth., Historical Med      omeprazole (PRILOSEC) 40 mg capsule Take 40 mg by mouth daily. , Historical Med      hydrOXYzine HCL (ATARAX) 25 mg tablet Take 25 mg by mouth three (3) times daily as needed., Historical Med      gabapentin (NEURONTIN) 800 mg tablet Take 800 mg by mouth three (3) times daily. , Historical Med      divalproex ER (DEPAKOTE ER) 250 mg ER tablet Take 500 mg by mouth two (2) times a day., Historical Med      carvediloL (COREG) 3.125 mg tablet Take 3.125 mg by mouth two (2) times daily (with meals). , Historical Med      aspirin 81 mg chewable tablet Take 1 Tab by mouth daily. , Normal, Disp-30 Tab, R-0      baclofen (LIORESAL) 10 mg tablet Take 10 mg by mouth three (3) times daily. 3 in the morning; 3 at noon, 3 in the evening, Historical Med      spironolactone (ALDACTONE) 50 mg tablet Take  by mouth daily. One at noon and 2 in the evening, Historical Med               Attestations: Harvey Rome MD    Please note that this dictation was completed with [employer], the computer voice recognition software. Quite often unanticipated grammatical, syntax, homophones, and other interpretive errors are inadvertently transcribed by the computer software. Please disregard these errors. Please excuse any errors that have escaped final proofreading. Thank you.

## 2023-05-01 ENCOUNTER — OFFICE VISIT (OUTPATIENT)
Dept: OBGYN CLINIC | Age: 58
End: 2023-05-01

## 2023-05-01 VITALS — WEIGHT: 132 LBS | BODY MASS INDEX: 22.53 KG/M2 | HEIGHT: 64 IN

## 2023-05-01 DIAGNOSIS — N76.0 VAGINITIS AND VULVOVAGINITIS: ICD-10-CM

## 2023-05-01 DIAGNOSIS — L90.0 LICHEN SCLEROSUS: Primary | ICD-10-CM

## 2023-05-01 DIAGNOSIS — Z13.820 SCREENING FOR OSTEOPOROSIS: ICD-10-CM

## 2023-05-01 PROCEDURE — 99213 OFFICE O/P EST LOW 20 MIN: CPT | Performed by: OBSTETRICS & GYNECOLOGY

## 2023-05-01 PROCEDURE — G8420 CALC BMI NORM PARAMETERS: HCPCS | Performed by: OBSTETRICS & GYNECOLOGY

## 2023-05-01 PROCEDURE — G8510 SCR DEP NEG, NO PLAN REQD: HCPCS | Performed by: OBSTETRICS & GYNECOLOGY

## 2023-05-01 PROCEDURE — 3017F COLORECTAL CA SCREEN DOC REV: CPT | Performed by: OBSTETRICS & GYNECOLOGY

## 2023-05-01 PROCEDURE — G8427 DOCREV CUR MEDS BY ELIG CLIN: HCPCS | Performed by: OBSTETRICS & GYNECOLOGY

## 2023-05-01 PROCEDURE — G9899 SCRN MAM PERF RSLTS DOC: HCPCS | Performed by: OBSTETRICS & GYNECOLOGY

## 2023-05-01 RX ORDER — CETIRIZINE HYDROCHLORIDE 10 MG/1
10 TABLET ORAL DAILY
Qty: 90 TABLET | Refills: 2 | Status: SHIPPED | OUTPATIENT
Start: 2023-05-01

## 2023-05-01 NOTE — PROGRESS NOTES
Miryam Sandoval is a 60 y.o. female, , No LMP recorded (lmp unknown). Patient is postmenopausal., who presents today for the following:  Chief Complaint   Patient presents with    Medication Check        Allergies   Allergen Reactions    Ciprofloxacin Rash    Nitrofurantoin Rash    Nortriptyline Vertigo    Prozac [Fluoxetine] Rash    Sulfa (Sulfonamide Antibiotics) Rash    Wellbutrin [Bupropion] Rash    Zofran [Ondansetron Hcl] Other (comments)     constipation       Current Outpatient Medications   Medication Sig    cetirizine (ZYRTEC) 10 mg tablet Take 1 Tablet by mouth daily. Indications: a type of allergy that causes red and itchy skin called atopic dermatitis    cyclobenzaprine (FLEXERIL) 10 mg tablet Take 1 Tablet by mouth three (3) times daily as needed for Muscle Spasm(s). acetaminophen (TYLENOL) 500 mg tablet Take 2 Tablets by mouth every six (6) hours as needed for Pain. alendronate (FOSAMAX) 70 mg tablet Take 1 Tablet by mouth every seven (7) days. estradioL (ESTRACE) 0.01 % (0.1 mg/gram) vaginal cream Place pea size of the cream on affected area, on Monday,,Friday    teriflunomide (AUBAGIO) 7 mg tablet Take 2 Tablets by mouth daily. ascorbic acid, vitamin C, (VITAMIN C) 500 mg tablet Take 1 Tablet by mouth. omeprazole (PRILOSEC) 40 mg capsule Take 1 Capsule by mouth daily. hydrOXYzine HCL (ATARAX) 25 mg tablet Take 1 Tablet by mouth three (3) times daily as needed. gabapentin (NEURONTIN) 800 mg tablet Take 1 Tablet by mouth three (3) times daily. divalproex ER (DEPAKOTE ER) 250 mg ER tablet Take 2 Tablets by mouth two (2) times a day. carvediloL (COREG) 3.125 mg tablet Take 1 Tablet by mouth two (2) times daily (with meals). aspirin 81 mg chewable tablet Take 1 Tab by mouth daily. baclofen (LIORESAL) 10 mg tablet Take 1 Tablet by mouth three (3) times daily.  3 in the morning; 3 at noon, 3 in the evening    spironolactone (ALDACTONE) 50 mg tablet Take  by mouth daily. One at noon and 2 in the evening     No current facility-administered medications for this visit.        Past Medical History:   Diagnosis Date    Arthritis     patient states not aware of this dx    Bladder outlet obstruction     Eyad-Danlos syndrome     GERD (gastroesophageal reflux disease)     Incomplete bladder emptying     MS (multiple sclerosis) (HCC)     MVP (mitral valve prolapse)     patient states told years ago, doesnt see cardiology    Recurrent UTI     UTI (urinary tract infection)        Past Surgical History:   Procedure Laterality Date    COLONOSCOPY N/A 10/10/2022    COLONOSCOPY performed by Ge Priest MD at [facility] ENDOSCOPY    HX CYSTOSTOMY      HX UROLOGICAL  04/14/2021    CYSTOURETHROSCOPY    HX UROLOGICAL  04/14/2021    BILATERAL  RETROGRADE PYELOGRAMS    HX UROLOGICAL  04/14/2021    URETHRAL DILATION    HX WISDOM TEETH EXTRACTION         Family History   Problem Relation Age of Onset    Hypertension Mother     Elevated Lipids Mother     Cancer Father         PROSTATE    Elevated Lipids Father        Social History     Socioeconomic History    Marital status:      Spouse name: Not on file    Number of children: Not on file    Years of education: Not on file    Highest education level: Not on file   Occupational History    Not on file   Tobacco Use    Smoking status: Never    Smokeless tobacco: Never   Vaping Use    Vaping Use: Never used   Substance and Sexual Activity    Alcohol use: Not Currently    Drug use: Never    Sexual activity: Yes     Partners: Male   Other Topics Concern    Not on file   Social History Narrative    Not on file     Social Determinants of Health     Financial Resource Strain: Not on file   Food Insecurity: Not on file   Transportation Needs: Not on file   Physical Activity: Not on file   Stress: Not on file   Social Connections: Not on file   Intimate Partner Violence: Not on file   Housing Stability: Not on file         HPI  Patient presents for follow up  Patient with history of chronic vulvitis and Lichens sclerosus  Doing well overall  Reports symptoms are better controlled although reports intermittent episodes or irritation  Also report recent dx of anal fissures and has been experiencing discomfort from those    Review of Systems   Constitutional: Negative. Respiratory: Negative. Cardiovascular: Negative. Gastrointestinal: Negative. Genitourinary: Negative. Musculoskeletal: Negative. Skin: Negative. Neurological: Negative. Endo/Heme/Allergies: Negative. Psychiatric/Behavioral: Negative. All other systems reviewed and are negative. Ht 5' 4\" (1.626 m)   Wt 132 lb (59.9 kg)   LMP  (LMP Unknown)   BMI 22.66 kg/m²    OBGyn Exam   PE:  Constitutional: General Appearance: healthy-appearing, well-nourished, well-developed, and well groomed. Psychiatric: Orientation: to time, place, and person. Mood and Affect: normal mood and affect and appropriate and active and alert. Abdomen: Auscultation/Inspection/Palpation: tenderness and mass and non-distended. Hernia: none palpated. Female Genitalia: Vulva: no masses,mild  atrophy, no lesions. Bladder/Urethra: no urethral discharge or mass and normal meatus and bladder non distended. Vagina no tenderness, erythema, cystocele, rectocele, abnormal vaginal discharge, or vesicle(s) or ulcers. Cervix: no discharge or cervical motion tenderness and grossly normal.     Uterus: mobile, non-tender, and no uterine prolapse. Adnexa/Parametria: no adnexal tenderness. 1. Lichen sclerosus  Continue previous topical treatment as needed  Add: cetirizine (ZYRTEC) 10 mg tablet; Take 1 Tablet by mouth daily. Indications: a type of allergy that causes red and itchy skin called atopic dermatitis  Dispense: 90 Tablet; Refill: 2    2. Vaginitis and vulvovaginitis    3. Screening for osteoporosis    - DEXA BONE DENSITY STUDY AXIAL;  Future

## 2023-05-26 RX ORDER — CARVEDILOL 3.12 MG/1
3.12 TABLET ORAL 2 TIMES DAILY WITH MEALS
COMMUNITY

## 2023-05-26 RX ORDER — OMEPRAZOLE 40 MG/1
40 CAPSULE, DELAYED RELEASE ORAL DAILY
COMMUNITY

## 2023-05-26 RX ORDER — BACLOFEN 10 MG/1
10 TABLET ORAL 3 TIMES DAILY
COMMUNITY

## 2023-05-26 RX ORDER — CETIRIZINE HYDROCHLORIDE 10 MG/1
10 TABLET ORAL DAILY
COMMUNITY
Start: 2023-05-01

## 2023-05-26 RX ORDER — ACETAMINOPHEN 500 MG
1000 TABLET ORAL EVERY 6 HOURS PRN
COMMUNITY
Start: 2023-02-13

## 2023-05-26 RX ORDER — ESTRADIOL 0.1 MG/G
CREAM VAGINAL
COMMUNITY
Start: 2022-03-16

## 2023-05-26 RX ORDER — GABAPENTIN 800 MG/1
800 TABLET ORAL 3 TIMES DAILY
COMMUNITY

## 2023-05-26 RX ORDER — ASCORBIC ACID 500 MG
500 TABLET ORAL
COMMUNITY

## 2023-05-26 RX ORDER — HYDROXYZINE HYDROCHLORIDE 25 MG/1
25 TABLET, FILM COATED ORAL 3 TIMES DAILY PRN
COMMUNITY

## 2023-05-26 RX ORDER — DIVALPROEX SODIUM 250 MG/1
500 TABLET, EXTENDED RELEASE ORAL 2 TIMES DAILY
COMMUNITY

## 2023-05-26 RX ORDER — TERIFLUNOMIDE 7 MG/1
14 TABLET, FILM COATED ORAL DAILY
COMMUNITY

## 2023-05-26 RX ORDER — ALENDRONATE SODIUM 70 MG/1
70 TABLET ORAL
COMMUNITY
Start: 2023-01-30

## 2023-05-26 RX ORDER — CYCLOBENZAPRINE HCL 10 MG
10 TABLET ORAL 3 TIMES DAILY PRN
COMMUNITY
Start: 2023-02-13

## 2023-05-26 RX ORDER — ASPIRIN 81 MG/1
81 TABLET, CHEWABLE ORAL DAILY
COMMUNITY
Start: 2021-04-17

## 2023-05-26 RX ORDER — SPIRONOLACTONE 50 MG/1
TABLET, FILM COATED ORAL DAILY
COMMUNITY

## 2023-07-18 ENCOUNTER — HOSPITAL ENCOUNTER (OUTPATIENT)
Facility: HOSPITAL | Age: 58
Discharge: HOME | End: 2023-07-21
Attending: OBSTETRICS & GYNECOLOGY
Payer: MEDICARE

## 2023-07-18 DIAGNOSIS — Z13.820 SPECIAL SCREENING FOR OSTEOPOROSIS: ICD-10-CM

## 2023-07-18 PROCEDURE — 77080 DXA BONE DENSITY AXIAL: CPT

## 2023-07-24 ENCOUNTER — HOSPITAL ENCOUNTER (EMERGENCY)
Facility: HOSPITAL | Age: 58
Discharge: HOME | End: 2023-07-24
Payer: MEDICARE

## 2023-07-24 VITALS
BODY MASS INDEX: 23.05 KG/M2 | RESPIRATION RATE: 16 BRPM | WEIGHT: 135 LBS | TEMPERATURE: 99 F | SYSTOLIC BLOOD PRESSURE: 132 MMHG | OXYGEN SATURATION: 98 % | DIASTOLIC BLOOD PRESSURE: 63 MMHG | HEART RATE: 81 BPM | HEIGHT: 64 IN

## 2023-07-24 DIAGNOSIS — N30.00 ACUTE CYSTITIS WITHOUT HEMATURIA: ICD-10-CM

## 2023-07-24 DIAGNOSIS — R30.0 DYSURIA: Primary | ICD-10-CM

## 2023-07-24 LAB
APPEARANCE UR: CLEAR
BACTERIA URNS QL MICRO: NEGATIVE /HPF
BILIRUB UR QL: NEGATIVE
COLOR UR: YELLOW
EPITH CASTS URNS QL MICRO: ABNORMAL /LPF
GLUCOSE UR STRIP.AUTO-MCNC: NEGATIVE MG/DL
HGB UR QL STRIP: NEGATIVE
KETONES UR QL STRIP.AUTO: NEGATIVE MG/DL
LEUKOCYTE ESTERASE UR QL STRIP.AUTO: NEGATIVE
NITRITE UR QL STRIP.AUTO: NEGATIVE
PH UR STRIP: 7 (ref 5–8)
PROT UR STRIP-MCNC: NEGATIVE MG/DL
RBC #/AREA URNS HPF: ABNORMAL /HPF (ref 0–5)
SP GR UR REFRACTOMETRY: 1.01 (ref 1–1.03)
UROBILINOGEN UR QL STRIP.AUTO: 0.1 EU/DL (ref 0.2–1)
WBC URNS QL MICRO: ABNORMAL /HPF (ref 0–4)

## 2023-07-24 PROCEDURE — 81001 URINALYSIS AUTO W/SCOPE: CPT

## 2023-07-24 PROCEDURE — 87086 URINE CULTURE/COLONY COUNT: CPT

## 2023-07-24 PROCEDURE — 99283 EMERGENCY DEPT VISIT LOW MDM: CPT

## 2023-07-24 RX ORDER — PHENAZOPYRIDINE HYDROCHLORIDE 200 MG/1
200 TABLET, FILM COATED ORAL 3 TIMES DAILY PRN
Qty: 6 TABLET | Refills: 0 | Status: SHIPPED | OUTPATIENT
Start: 2023-07-24 | End: 2023-07-26

## 2023-07-24 RX ORDER — CEFUROXIME AXETIL 500 MG/1
500 TABLET ORAL 2 TIMES DAILY
Qty: 14 TABLET | Refills: 0 | Status: SHIPPED | OUTPATIENT
Start: 2023-07-24 | End: 2023-07-31

## 2023-07-24 ASSESSMENT — ENCOUNTER SYMPTOMS
SHORTNESS OF BREATH: 0
EYES NEGATIVE: 1
ABDOMINAL PAIN: 0
GASTROINTESTINAL NEGATIVE: 1
CHEST TIGHTNESS: 0
ALLERGIC/IMMUNOLOGIC NEGATIVE: 1
RESPIRATORY NEGATIVE: 1

## 2023-07-24 NOTE — ED PROVIDER NOTES
[facility] EMERGENCY DEPARTMENT  EMERGENCY DEPARTMENT HISTORY AND PHYSICAL EXAM      Date: 7/24/2023  Patient Name: Jennifer Snowden  MRN: [id number]  YOB: 1965  Date of evaluation: 7/24/2023  Provider: STEFANI Case NP   Note Started: 8:23 AM EDT 7/24/23    HISTORY OF PRESENT ILLNESS     Chief Complaint   Patient presents with    Dysuria       History Provided By: Patient    HPI: Jennifer Snowden is a 60 y.o. female with pmh of urinary issues and frequent uti. Pt having pain in the urethra. No back pain, no fevers. Pt just treated 3 weeks ago for uti and completed 10 days of antibiotics. Reports the sx improved despite negative urine culture. Pt sx have restarted over the past week. Review of Systems   Constitutional:  Negative for chills and fever. HENT: Negative. Eyes: Negative. Respiratory: Negative. Negative for chest tightness and shortness of breath. Cardiovascular: Negative. Negative for chest pain and palpitations. Gastrointestinal: Negative. Negative for abdominal pain. Endocrine: Negative. Genitourinary:  Positive for dysuria and vaginal pain (irritation only at the area of the urinary meatus. ). Negative for difficulty urinating, flank pain, frequency, hematuria, pelvic pain and urgency. Musculoskeletal: Negative. Negative for arthralgias and myalgias. Skin: Negative. Allergic/Immunologic: Negative. Neurological: Negative. Hematological: Negative. Psychiatric/Behavioral: Negative. All other systems reviewed and are negative.      PAST MEDICAL HISTORY   Past Medical History:  Past Medical History:   Diagnosis Date    Arthritis     patient states not aware of this dx    Bladder outlet obstruction     Leisa-Danlos syndrome     GERD (gastroesophageal reflux disease)     Incomplete bladder emptying     MS (multiple sclerosis) ([facility])     MVP (mitral valve prolapse)     patient states told years ago, doesnt see cardiology    Recurrent UTI     UTI (urinary

## 2023-07-25 ENCOUNTER — TELEPHONE (OUTPATIENT)
Age: 58
End: 2023-07-25

## 2023-07-25 LAB
BACTERIA SPEC CULT: NORMAL
Lab: NORMAL

## 2023-07-25 NOTE — TELEPHONE ENCOUNTER
Patient contacted the office reports she thinks her lichens sclerosus is acting up again she reports this time the presentation is different. It is close to the urethra and it is white. She reports still has clobetasol cream and will try it to see if it goes away if it doesn't help she will contact office to schedule an appt.

## 2023-07-28 DIAGNOSIS — M85.80 OSTEOPENIA, UNSPECIFIED LOCATION: Primary | ICD-10-CM

## 2023-07-28 RX ORDER — ALENDRONATE SODIUM 70 MG/1
70 TABLET ORAL
Qty: 12 TABLET | Refills: 3 | Status: SHIPPED | OUTPATIENT
Start: 2023-07-28

## 2023-08-01 ENCOUNTER — OFFICE VISIT (OUTPATIENT)
Age: 58
End: 2023-08-01
Payer: MEDICARE

## 2023-08-01 VITALS
DIASTOLIC BLOOD PRESSURE: 65 MMHG | HEART RATE: 78 BPM | OXYGEN SATURATION: 95 % | SYSTOLIC BLOOD PRESSURE: 125 MMHG | BODY MASS INDEX: 23.22 KG/M2 | WEIGHT: 136 LBS | HEIGHT: 64 IN

## 2023-08-01 DIAGNOSIS — L43.9 LICHEN PLANUS: ICD-10-CM

## 2023-08-01 DIAGNOSIS — N76.0 VAGINITIS AND VULVOVAGINITIS: Primary | ICD-10-CM

## 2023-08-01 PROCEDURE — G8427 DOCREV CUR MEDS BY ELIG CLIN: HCPCS | Performed by: OBSTETRICS & GYNECOLOGY

## 2023-08-01 PROCEDURE — G8420 CALC BMI NORM PARAMETERS: HCPCS | Performed by: OBSTETRICS & GYNECOLOGY

## 2023-08-01 PROCEDURE — 3017F COLORECTAL CA SCREEN DOC REV: CPT | Performed by: OBSTETRICS & GYNECOLOGY

## 2023-08-01 PROCEDURE — 1036F TOBACCO NON-USER: CPT | Performed by: OBSTETRICS & GYNECOLOGY

## 2023-08-01 PROCEDURE — 99213 OFFICE O/P EST LOW 20 MIN: CPT | Performed by: OBSTETRICS & GYNECOLOGY

## 2023-08-01 RX ORDER — CLOBETASOL PROPIONATE 0.5 MG/G
OINTMENT TOPICAL
Qty: 45 G | Refills: 1 | Status: SHIPPED | OUTPATIENT
Start: 2023-08-01

## 2023-08-01 NOTE — PROGRESS NOTES
Amandeep Araya is a 60 y.o. female who presents today for the following:  Chief Complaint   Patient presents with    Other     Patient c/o vaginal irritation. Allergies   Allergen Reactions    Bupropion Rash    Ciprofloxacin Rash    Fluoxetine Rash    Nitrofurantoin Rash    Nortriptyline Dizziness or Vertigo    Ondansetron Hcl Other (See Comments)     constipation    Sulfa Antibiotics Rash       Current Outpatient Medications   Medication Sig Dispense Refill    clobetasol (TEMOVATE) 0.05 % ointment Apply topically 2 times daily. 45 g 1    alendronate (FOSAMAX) 70 MG tablet Take 1 tablet by mouth every 7 days      ascorbic acid (VITAMIN C) 500 MG tablet Take 1 tablet by mouth      aspirin 81 MG chewable tablet Take 1 tablet by mouth daily      baclofen (LIORESAL) 10 MG tablet Take 1 tablet by mouth 3 times daily      carvedilol (COREG) 3.125 MG tablet Take 1 tablet by mouth 2 times daily (with meals)      divalproex (DEPAKOTE ER) 250 MG extended release tablet Take 2 tablets by mouth 2 times daily      estradiol (ESTRACE) 0.1 MG/GM vaginal cream Place pea size of the cream on affected area, on Monday,Wensday,Friday      gabapentin (NEURONTIN) 800 MG tablet Take 1 tablet by mouth 3 times daily. hydrOXYzine HCl (ATARAX) 25 MG tablet Take 1 tablet by mouth 3 times daily as needed      omeprazole (PRILOSEC) 40 MG delayed release capsule Take 1 capsule by mouth daily      spironolactone (ALDACTONE) 50 MG tablet Take by mouth daily      alendronate (FOSAMAX) 70 MG tablet Take 1 tablet by mouth every 7 days Take with a full glass of water upon arising for the day. Consume on an empty stomach at least 30 minutes before the first food, beverage, or medication. Stay upright (do not lie down) for at least 30 minutes. Do not crush or break.  (Patient not taking: Reported on 8/1/2023) 12 tablet 3    acetaminophen (TYLENOL) 500 MG tablet Take 2 tablets by mouth every 6 hours as needed      cetirizine (ZYRTEC) 10 MG

## 2023-08-02 ENCOUNTER — APPOINTMENT (OUTPATIENT)
Facility: HOSPITAL | Age: 58
End: 2023-08-02
Payer: MEDICARE

## 2023-08-02 ENCOUNTER — HOSPITAL ENCOUNTER (EMERGENCY)
Facility: HOSPITAL | Age: 58
Discharge: HOME | End: 2023-08-02
Attending: STUDENT IN AN ORGANIZED HEALTH CARE EDUCATION/TRAINING PROGRAM
Payer: MEDICARE

## 2023-08-02 VITALS
OXYGEN SATURATION: 99 % | WEIGHT: 135 LBS | SYSTOLIC BLOOD PRESSURE: 122 MMHG | HEIGHT: 64 IN | DIASTOLIC BLOOD PRESSURE: 63 MMHG | HEART RATE: 81 BPM | RESPIRATION RATE: 16 BRPM | BODY MASS INDEX: 23.05 KG/M2 | TEMPERATURE: 98.4 F

## 2023-08-02 DIAGNOSIS — R30.0 DYSURIA: Primary | ICD-10-CM

## 2023-08-02 DIAGNOSIS — L90.0 LICHEN SCLEROSUS: ICD-10-CM

## 2023-08-02 DIAGNOSIS — N20.0 KIDNEY STONE: ICD-10-CM

## 2023-08-02 DIAGNOSIS — R31.9 HEMATURIA, UNSPECIFIED TYPE: ICD-10-CM

## 2023-08-02 DIAGNOSIS — N76.0 VAGINITIS AND VULVOVAGINITIS: ICD-10-CM

## 2023-08-02 LAB
ANION GAP SERPL CALC-SCNC: 8 MMOL/L (ref 5–15)
APPEARANCE UR: CLEAR
BACTERIA URNS QL MICRO: NEGATIVE /HPF
BASOPHILS # BLD: 0.1 K/UL (ref 0–0.1)
BASOPHILS NFR BLD: 1 % (ref 0–1)
BILIRUB UR QL: NEGATIVE
BUN SERPL-MCNC: 18 MG/DL (ref 6–20)
BUN/CREAT SERPL: 22 (ref 12–20)
CA-I BLD-MCNC: 9.7 MG/DL (ref 8.5–10.1)
CHLORIDE SERPL-SCNC: 98 MMOL/L (ref 97–108)
CO2 SERPL-SCNC: 29 MMOL/L (ref 21–32)
COLOR UR: YELLOW
CREAT SERPL-MCNC: 0.82 MG/DL (ref 0.55–1.02)
DIFFERENTIAL METHOD BLD: ABNORMAL
EOSINOPHIL # BLD: 0.1 K/UL (ref 0–0.4)
EOSINOPHIL NFR BLD: 2 % (ref 0–7)
EPITH CASTS URNS QL MICRO: ABNORMAL /LPF
ERYTHROCYTE [DISTWIDTH] IN BLOOD BY AUTOMATED COUNT: 14.9 % (ref 11.5–14.5)
GLUCOSE SERPL-MCNC: 126 MG/DL (ref 65–100)
GLUCOSE UR STRIP.AUTO-MCNC: NEGATIVE MG/DL
HCT VFR BLD AUTO: 42.5 % (ref 35–47)
HGB BLD-MCNC: 14.7 G/DL (ref 11.5–16)
HGB UR QL STRIP: ABNORMAL
IMM GRANULOCYTES # BLD AUTO: 0 K/UL (ref 0–0.04)
IMM GRANULOCYTES NFR BLD AUTO: 0 % (ref 0–0.5)
KETONES UR QL STRIP.AUTO: NEGATIVE MG/DL
LEUKOCYTE ESTERASE UR QL STRIP.AUTO: NEGATIVE
LYMPHOCYTES # BLD: 1.1 K/UL (ref 0.8–3.5)
LYMPHOCYTES NFR BLD: 13 % (ref 12–49)
MCH RBC QN AUTO: 31 PG (ref 26–34)
MCHC RBC AUTO-ENTMCNC: 34.6 G/DL (ref 30–36.5)
MCV RBC AUTO: 89.7 FL (ref 80–99)
MONOCYTES # BLD: 0.5 K/UL (ref 0–1)
MONOCYTES NFR BLD: 6 % (ref 5–13)
MUCOUS THREADS URNS QL MICRO: ABNORMAL /LPF
NEUTS SEG # BLD: 6.6 K/UL (ref 1.8–8)
NEUTS SEG NFR BLD: 78 % (ref 32–75)
NITRITE UR QL STRIP.AUTO: NEGATIVE
PH UR STRIP: 7 (ref 5–8)
PLATELET # BLD AUTO: 293 K/UL (ref 150–400)
PMV BLD AUTO: 10.4 FL (ref 8.9–12.9)
POTASSIUM SERPL-SCNC: 4.9 MMOL/L (ref 3.5–5.1)
PROT UR STRIP-MCNC: NEGATIVE MG/DL
RBC # BLD AUTO: 4.74 M/UL (ref 3.8–5.2)
RBC #/AREA URNS HPF: ABNORMAL /HPF (ref 0–5)
SODIUM SERPL-SCNC: 135 MMOL/L (ref 136–145)
SP GR UR REFRACTOMETRY: 1.01 (ref 1–1.03)
URINE CULTURE IF INDICATED: ABNORMAL
UROBILINOGEN UR QL STRIP.AUTO: 0.1 EU/DL (ref 0.2–1)
WBC # BLD AUTO: 8.3 K/UL (ref 3.6–11)
WBC URNS QL MICRO: ABNORMAL /HPF (ref 0–4)

## 2023-08-02 PROCEDURE — 81001 URINALYSIS AUTO W/SCOPE: CPT

## 2023-08-02 PROCEDURE — 99285 EMERGENCY DEPT VISIT HI MDM: CPT

## 2023-08-02 PROCEDURE — 74177 CT ABD & PELVIS W/CONTRAST: CPT

## 2023-08-02 PROCEDURE — 85025 COMPLETE CBC W/AUTO DIFF WBC: CPT

## 2023-08-02 PROCEDURE — 80048 BASIC METABOLIC PNL TOTAL CA: CPT

## 2023-08-02 PROCEDURE — 6360000004 HC RX CONTRAST MEDICATION: Performed by: STUDENT IN AN ORGANIZED HEALTH CARE EDUCATION/TRAINING PROGRAM

## 2023-08-02 RX ORDER — TAMSULOSIN HYDROCHLORIDE 0.4 MG/1
0.4 CAPSULE ORAL DAILY
Qty: 30 CAPSULE | Refills: 0 | Status: SHIPPED | OUTPATIENT
Start: 2023-08-02

## 2023-08-02 RX ADMIN — IOPAMIDOL 100 ML: 755 INJECTION, SOLUTION INTRAVENOUS at 09:15

## 2023-08-02 ASSESSMENT — PAIN DESCRIPTION - LOCATION: LOCATION: GROIN

## 2023-08-02 ASSESSMENT — PAIN SCALES - GENERAL: PAINLEVEL_OUTOF10: 1

## 2023-08-02 ASSESSMENT — PAIN - FUNCTIONAL ASSESSMENT: PAIN_FUNCTIONAL_ASSESSMENT: 0-10

## 2023-08-02 ASSESSMENT — LIFESTYLE VARIABLES
HOW MANY STANDARD DRINKS CONTAINING ALCOHOL DO YOU HAVE ON A TYPICAL DAY: PATIENT DOES NOT DRINK
HOW OFTEN DO YOU HAVE A DRINK CONTAINING ALCOHOL: NEVER

## 2023-08-02 ASSESSMENT — PAIN DESCRIPTION - DESCRIPTORS: DESCRIPTORS: ACHING

## 2023-08-02 NOTE — ED PROVIDER NOTES
[facility] EMERGENCY DEPT  EMERGENCY DEPARTMENT HISTORY AND PHYSICAL EXAM      Date: 8/2/2023  Patient Name: Kandice Denver  MRN: [id number]  [address] [facility] 1965  Date of evaluation: 8/2/2023  Provider: Bull Hoang DO   Note Started: 8:33 AM EDT 8/2/23    HISTORY OF PRESENT ILLNESS     Chief Complaint   Patient presents with    Dysuria    Hematuria       History Provided By: Patient    HPI: Kandice Denver is a 60 y.o. female with history of Leisa-Danlos, bladder outlet obstruction, MS, and recurrent UTIs who presents to the emergency department due to dysuria and hematuria that been ongoing since yesterday. This is patient's third visit for similar complaints over the past 2 weeks. Has been seen twice in the past 2 weeks for similar complaints. Has finished a course of antibiotics however, with unremarkable UA on July 24. Patient was also seen by her gynecologist yesterday, told of her chronic lichen sclerosis, otherwise pelvic exam was unremarkable, and told to take topical estrogen and steroids. Patient returns today due to recurrent dysuria and hematuria. States that she is noticing blood when wiping. States that she is having burning with the initiation of urination and has having difficulty emptying her bladder. Denies any fevers, vomiting, abdominal pain, back pain, chest pain, shortness of breath or any other symptoms complaints.     PAST MEDICAL HISTORY   Past Medical History:  Past Medical History:   Diagnosis Date    Arthritis     patient states not aware of this dx    Bladder outlet obstruction     Leisa-Danlos syndrome     GERD (gastroesophageal reflux disease)     Incomplete bladder emptying     MS (multiple sclerosis) (HCC)     MVP (mitral valve prolapse)     patient states told years ago, doesnt see cardiology    Recurrent UTI     UTI (urinary tract infection)        Past Surgical History:  Past Surgical History:   Procedure Laterality Date    COLONOSCOPY N/A 10/10/2022    COLONOSCOPY

## 2023-08-02 NOTE — ED TRIAGE NOTES
Pt experiencing pain with urination and hematuria. Was seen at GYN yesterday and reports pain with exam.  She noticed blood following exam yesterday.   Hx of UTIs

## 2023-08-04 LAB
A VAGINAE DNA VAG QL NAA+PROBE: NORMAL SCORE
BVAB2 DNA VAG QL NAA+PROBE: NORMAL SCORE
C ALBICANS DNA VAG QL NAA+PROBE: NEGATIVE
C GLABRATA DNA VAG QL NAA+PROBE: NEGATIVE
MEGA1 DNA VAG QL NAA+PROBE: NORMAL SCORE
T VAGINALIS DNA VAG QL NAA+PROBE: NEGATIVE

## 2023-08-08 NOTE — PROGRESS NOTES
(04/14/2021). PSocHx:  reports that she has never smoked. She has never used smokeless tobacco. She reports that she does not currently use alcohol. She reports that she does not use drugs. [unfilled]     1. Recurrent UTI  Overview:  Symptoms of UTI: \"heavy sensation\" in the lower abdomen and pelvis, she   denies fever or chills, nausea or vomiting. She has multiple drug allergies. 4/29/21 culture with mixed urogenital kareen, 25,000-50,000 colony forming   units per mL.  4/14/21 culture with no growth (<1000 cfu/mL). 4/6/21 culture with proteus mirabilis, 25,000-50,000 colony forming units   per mL. CT (non-contrast) done on 6/24/21 shows small stone in the right kidney,   non-obstructive. No hydronephrosis. Normal kidneys. Symmetric perfusion   and excretion. Orders:  -     AMB POC URINALYSIS DIP STICK AUTO W/O MICRO  -     Cytology, urine  2. Post-menopausal atrophic vaginitis  -     AMB POC URINALYSIS DIP STICK AUTO W/O MICRO  -     Cytology, urine  3. Multiple sclerosis (HCC)  -     Cytology, urine  4. Leisa-Danlos syndrome  5. Lichen sclerosus  6. Hematuria, unspecified type  -     AMB POC PVR, ALETHEA,POST-VOID RES,US,NON-IMAGING  -     CYTOLOGY, URINE; Future  7. Spastic pelvic floor syndrome  8. Pneumaturia       Review of Systems   Gastrointestinal:  Positive for abdominal pain. Genitourinary:  Positive for difficulty urinating and pelvic pain. All other systems reviewed and are negative. Patient denies the symptoms of COVID-19 per routine screening guidelines. Physical Exam  Vitals and nursing note reviewed. Constitutional:       Appearance: Normal appearance. HENT:      Head: Normocephalic and atraumatic. Nose: Nose normal.      Mouth/Throat:      Mouth: Mucous membranes are moist.   Eyes:      Extraocular Movements: Extraocular movements intact. Conjunctiva/sclera: Conjunctivae normal.   Cardiovascular:      Rate and Rhythm: Normal rate and regular rhythm.

## 2023-08-09 ENCOUNTER — OFFICE VISIT (OUTPATIENT)
Age: 58
End: 2023-08-09
Payer: MEDICARE

## 2023-08-09 VITALS — SYSTOLIC BLOOD PRESSURE: 110 MMHG | DIASTOLIC BLOOD PRESSURE: 69 MMHG

## 2023-08-09 DIAGNOSIS — Q79.60 EHLERS-DANLOS SYNDROME: ICD-10-CM

## 2023-08-09 DIAGNOSIS — L90.0 LICHEN SCLEROSUS: ICD-10-CM

## 2023-08-09 DIAGNOSIS — R39.89 PNEUMATURIA: ICD-10-CM

## 2023-08-09 DIAGNOSIS — N39.0 RECURRENT UTI: Primary | ICD-10-CM

## 2023-08-09 DIAGNOSIS — R19.8 SPASTIC PELVIC FLOOR SYNDROME: ICD-10-CM

## 2023-08-09 DIAGNOSIS — N95.2 POST-MENOPAUSAL ATROPHIC VAGINITIS: ICD-10-CM

## 2023-08-09 DIAGNOSIS — G35 MULTIPLE SCLEROSIS (HCC): ICD-10-CM

## 2023-08-09 DIAGNOSIS — R31.9 HEMATURIA, UNSPECIFIED TYPE: ICD-10-CM

## 2023-08-09 PROBLEM — K59.02 SPASTIC PELVIC FLOOR SYNDROME: Status: ACTIVE | Noted: 2023-08-09

## 2023-08-09 LAB
BILIRUBIN, URINE, POC: NEGATIVE
BLOOD URINE, POC: ABNORMAL
GLUCOSE URINE, POC: NEGATIVE
KETONES, URINE, POC: NEGATIVE
LEUKOCYTE ESTERASE, URINE, POC: ABNORMAL
NITRITE, URINE, POC: NEGATIVE
PH, URINE, POC: 6.5 (ref 4.6–8)
PROTEIN,URINE, POC: NEGATIVE
PVR, POC: NORMAL CC
SPECIFIC GRAVITY, URINE, POC: 1.01 (ref 1–1.03)
URINALYSIS CLARITY, POC: CLEAR
URINALYSIS COLOR, POC: YELLOW
UROBILINOGEN, POC: ABNORMAL

## 2023-08-09 PROCEDURE — 1036F TOBACCO NON-USER: CPT | Performed by: UROLOGY

## 2023-08-09 PROCEDURE — 99214 OFFICE O/P EST MOD 30 MIN: CPT | Performed by: UROLOGY

## 2023-08-09 PROCEDURE — G8420 CALC BMI NORM PARAMETERS: HCPCS | Performed by: UROLOGY

## 2023-08-09 PROCEDURE — 3017F COLORECTAL CA SCREEN DOC REV: CPT | Performed by: UROLOGY

## 2023-08-09 PROCEDURE — 51798 US URINE CAPACITY MEASURE: CPT | Performed by: UROLOGY

## 2023-08-09 PROCEDURE — 81003 URINALYSIS AUTO W/O SCOPE: CPT | Performed by: UROLOGY

## 2023-08-09 PROCEDURE — G8427 DOCREV CUR MEDS BY ELIG CLIN: HCPCS | Performed by: UROLOGY

## 2023-08-09 ASSESSMENT — ENCOUNTER SYMPTOMS: ABDOMINAL PAIN: 1

## 2023-08-11 ENCOUNTER — TELEPHONE (OUTPATIENT)
Age: 58
End: 2023-08-11

## 2023-08-29 ENCOUNTER — HOSPITAL ENCOUNTER (OUTPATIENT)
Facility: HOSPITAL | Age: 58
Discharge: HOME | End: 2023-08-29
Attending: UROLOGY | Admitting: UROLOGY
Payer: MEDICARE

## 2023-08-29 ENCOUNTER — ANESTHESIA EVENT (OUTPATIENT)
Facility: HOSPITAL | Age: 58
End: 2023-08-29
Payer: MEDICARE

## 2023-08-29 ENCOUNTER — ANESTHESIA (OUTPATIENT)
Facility: HOSPITAL | Age: 58
End: 2023-08-29
Payer: MEDICARE

## 2023-08-29 VITALS
RESPIRATION RATE: 16 BRPM | TEMPERATURE: 98.5 F | HEIGHT: 64 IN | OXYGEN SATURATION: 100 % | HEART RATE: 76 BPM | SYSTOLIC BLOOD PRESSURE: 124 MMHG | BODY MASS INDEX: 22.2 KG/M2 | DIASTOLIC BLOOD PRESSURE: 60 MMHG | WEIGHT: 130 LBS

## 2023-08-29 DIAGNOSIS — G89.18 POST-OP PAIN: Primary | ICD-10-CM

## 2023-08-29 PROBLEM — N39.0 URINARY TRACT INFECTION: Status: ACTIVE | Noted: 2023-08-29

## 2023-08-29 LAB
ANION GAP BLD CALC-SCNC: 9
CA-I BLD-MCNC: 1.04 MMOL/L (ref 1.12–1.32)
CHLORIDE BLD-SCNC: 104 MMOL/L (ref 98–107)
CO2 BLD-SCNC: 28 MMOL/L
CREAT UR-MCNC: 0.5 MG/DL (ref 0.6–1.3)
GLUCOSE BLD STRIP.AUTO-MCNC: 88 MG/DL (ref 65–100)
POTASSIUM BLD-SCNC: 3.8 MMOL/L (ref 3.5–5.5)
SODIUM BLD-SCNC: 140 MMOL/L (ref 136–145)

## 2023-08-29 PROCEDURE — 2500000003 HC RX 250 WO HCPCS: Performed by: NURSE ANESTHETIST, CERTIFIED REGISTERED

## 2023-08-29 PROCEDURE — 3600000002 HC SURGERY LEVEL 2 BASE: Performed by: UROLOGY

## 2023-08-29 PROCEDURE — 3700000001 HC ADD 15 MINUTES (ANESTHESIA): Performed by: UROLOGY

## 2023-08-29 PROCEDURE — 80047 BASIC METABLC PNL IONIZED CA: CPT

## 2023-08-29 PROCEDURE — 3600000012 HC SURGERY LEVEL 2 ADDTL 15MIN: Performed by: UROLOGY

## 2023-08-29 PROCEDURE — 6360000002 HC RX W HCPCS: Performed by: NURSE ANESTHETIST, CERTIFIED REGISTERED

## 2023-08-29 PROCEDURE — 52260 CYSTOSCOPY AND TREATMENT: CPT | Performed by: UROLOGY

## 2023-08-29 PROCEDURE — 2709999900 HC NON-CHARGEABLE SUPPLY: Performed by: UROLOGY

## 2023-08-29 PROCEDURE — 6370000000 HC RX 637 (ALT 250 FOR IP): Performed by: UROLOGY

## 2023-08-29 PROCEDURE — 6360000002 HC RX W HCPCS: Performed by: UROLOGY

## 2023-08-29 PROCEDURE — 7100000000 HC PACU RECOVERY - FIRST 15 MIN: Performed by: UROLOGY

## 2023-08-29 PROCEDURE — 7100000010 HC PHASE II RECOVERY - FIRST 15 MIN: Performed by: UROLOGY

## 2023-08-29 PROCEDURE — 3700000000 HC ANESTHESIA ATTENDED CARE: Performed by: UROLOGY

## 2023-08-29 PROCEDURE — 7100000011 HC PHASE II RECOVERY - ADDTL 15 MIN: Performed by: UROLOGY

## 2023-08-29 PROCEDURE — 2580000003 HC RX 258: Performed by: UROLOGY

## 2023-08-29 PROCEDURE — 7100000001 HC PACU RECOVERY - ADDTL 15 MIN: Performed by: UROLOGY

## 2023-08-29 RX ORDER — OXYCODONE HYDROCHLORIDE AND ACETAMINOPHEN 5; 325 MG/1; MG/1
1 TABLET ORAL ONCE
Status: COMPLETED | OUTPATIENT
Start: 2023-08-29 | End: 2023-08-29

## 2023-08-29 RX ORDER — METHENAMINE, SODIUM PHOSPHATE MONOBASIC, PHENYL SALICYLATE, METHYLENE BLUE, HYOSCYAMINE SULFATE 81.6; 40.8; 36.2; 10.8; .12 MG/1; MG/1; MG/1; MG/1; MG/1
1 TABLET ORAL 4 TIMES DAILY
Qty: 30 TABLET | Refills: 1 | Status: SHIPPED | OUTPATIENT
Start: 2023-08-29

## 2023-08-29 RX ORDER — SODIUM CHLORIDE, SODIUM LACTATE, POTASSIUM CHLORIDE, CALCIUM CHLORIDE 600; 310; 30; 20 MG/100ML; MG/100ML; MG/100ML; MG/100ML
INJECTION, SOLUTION INTRAVENOUS CONTINUOUS
Status: DISCONTINUED | OUTPATIENT
Start: 2023-08-29 | End: 2023-08-29 | Stop reason: HOSPADM

## 2023-08-29 RX ORDER — PHENAZOPYRIDINE HYDROCHLORIDE 95 MG/1
95 TABLET ORAL ONCE
Status: COMPLETED | OUTPATIENT
Start: 2023-08-29 | End: 2023-08-29

## 2023-08-29 RX ORDER — KETOROLAC TROMETHAMINE 30 MG/ML
30 INJECTION, SOLUTION INTRAMUSCULAR; INTRAVENOUS ONCE
Status: COMPLETED | OUTPATIENT
Start: 2023-08-29 | End: 2023-08-29

## 2023-08-29 RX ORDER — OXYCODONE HYDROCHLORIDE AND ACETAMINOPHEN 5; 325 MG/1; MG/1
1 TABLET ORAL EVERY 6 HOURS PRN
Qty: 12 TABLET | Refills: 0 | Status: SHIPPED | OUTPATIENT
Start: 2023-08-29 | End: 2023-09-01

## 2023-08-29 RX ORDER — CEPHALEXIN 500 MG/1
500 CAPSULE ORAL 2 TIMES DAILY
Qty: 14 CAPSULE | Refills: 1 | Status: SHIPPED | OUTPATIENT
Start: 2023-08-29 | End: 2023-09-05

## 2023-08-29 RX ORDER — LIDOCAINE HYDROCHLORIDE 20 MG/ML
INJECTION, SOLUTION EPIDURAL; INFILTRATION; INTRACAUDAL; PERINEURAL PRN
Status: DISCONTINUED | OUTPATIENT
Start: 2023-08-29 | End: 2023-08-29 | Stop reason: SDUPTHER

## 2023-08-29 RX ORDER — LIDOCAINE HYDROCHLORIDE 20 MG/ML
JELLY TOPICAL PRN
Status: DISCONTINUED | OUTPATIENT
Start: 2023-08-29 | End: 2023-08-29 | Stop reason: ALTCHOICE

## 2023-08-29 RX ORDER — KETOROLAC TROMETHAMINE 30 MG/ML
30 INJECTION, SOLUTION INTRAMUSCULAR; INTRAVENOUS ONCE
Status: DISCONTINUED | OUTPATIENT
Start: 2023-08-29 | End: 2023-08-29 | Stop reason: HOSPADM

## 2023-08-29 RX ADMIN — LIDOCAINE HYDROCHLORIDE 40 MG: 20 INJECTION, SOLUTION EPIDURAL; INFILTRATION; INTRACAUDAL; PERINEURAL at 08:34

## 2023-08-29 RX ADMIN — OXYCODONE HYDROCHLORIDE AND ACETAMINOPHEN 1 TABLET: 5; 325 TABLET ORAL at 09:52

## 2023-08-29 RX ADMIN — CEFAZOLIN SODIUM 2000 MG: 1 INJECTION, POWDER, FOR SOLUTION INTRAMUSCULAR; INTRAVENOUS at 08:24

## 2023-08-29 RX ADMIN — KETOROLAC TROMETHAMINE 30 MG: 30 INJECTION, SOLUTION INTRAMUSCULAR; INTRAVENOUS at 09:16

## 2023-08-29 RX ADMIN — SODIUM CHLORIDE, POTASSIUM CHLORIDE, SODIUM LACTATE AND CALCIUM CHLORIDE: 600; 310; 30; 20 INJECTION, SOLUTION INTRAVENOUS at 08:24

## 2023-08-29 RX ADMIN — PHENAZOPYRIDINE HYDROCHLORIDE 95 MG: 95 TABLET ORAL at 10:46

## 2023-08-29 RX ADMIN — PROPOFOL 20 MG: 10 INJECTION, EMULSION INTRAVENOUS at 08:41

## 2023-08-29 RX ADMIN — PROPOFOL 60 MG: 10 INJECTION, EMULSION INTRAVENOUS at 08:34

## 2023-08-29 RX ADMIN — PROPOFOL 30 MG: 10 INJECTION, EMULSION INTRAVENOUS at 08:35

## 2023-08-29 RX ADMIN — PROPOFOL 30 MG: 10 INJECTION, EMULSION INTRAVENOUS at 08:37

## 2023-08-29 RX ADMIN — PROPOFOL 30 MG: 10 INJECTION, EMULSION INTRAVENOUS at 08:39

## 2023-08-29 ASSESSMENT — PAIN DESCRIPTION - DESCRIPTORS
DESCRIPTORS: DULL
DESCRIPTORS: DISCOMFORT

## 2023-08-29 ASSESSMENT — PAIN SCALES - GENERAL
PAINLEVEL_OUTOF10: 1
PAINLEVEL_OUTOF10: 2
PAINLEVEL_OUTOF10: 2

## 2023-08-29 ASSESSMENT — PAIN DESCRIPTION - LOCATION: LOCATION: OTHER (COMMENT)

## 2023-08-29 ASSESSMENT — PAIN - FUNCTIONAL ASSESSMENT: PAIN_FUNCTIONAL_ASSESSMENT: NONE - DENIES PAIN

## 2023-08-29 NOTE — DISCHARGE INSTRUCTIONS
the procedure  Sexual relations may resume the day after the procedure   Some patient may also be prescribed antibiotics following procedure. Minor pain may also be treated with over-the-counter, non-prescription drugs such as acetaminophen. Risks: As with any surgical procedure, there are some risks involved with a cystoscopy.  Complications may include:    Urinary tract infection  Bleeding   Injury to bladder or urethra     Patient should contact their physician if they experience any of the following symptoms:    Fever  Chills  Painful urination   Passing large amounts of blood in your urine     Please contact our office at [facility]

## 2023-08-29 NOTE — ANESTHESIA POSTPROCEDURE EVALUATION
Department of Anesthesiology  Postprocedure Note    Patient: Etienne Koenig  MRN: [id number]  YOB: 1965  Date of evaluation: 8/29/2023      Procedure Summary     Date: 08/29/23 Room / Location: [facility] MA[facility] CYSTO / [facility] MAIN OR    Anesthesia Start: 0119 Anesthesia Stop: 0721    Procedure: CYSTOURETHROSCOPY (Ureter) Diagnosis:       Recurrent UTI      (Recurrent UTI [N39.0])    Surgeons: Laure Mccarthy MD Responsible Provider: Arsenio Gr MD    Anesthesia Type: MAC ASA Status: 3          Anesthesia Type: MAC    Lionel Phase I: Lionel Score: 10    Lionel Phase II: Lionel Score: 10      Anesthesia Post Evaluation    Patient location during evaluation: PACU  Patient participation: complete - patient participated  Level of consciousness: awake  Pain score: 0  Airway patency: patent  Nausea & Vomiting: no nausea and no vomiting  Complications: no  Cardiovascular status: hemodynamically stable  Respiratory status: acceptable  Hydration status: stable  Multimodal analgesia pain management approach

## 2023-08-29 NOTE — OP NOTE
Operative Note      Patient: Gabriella Beanvidez  YOB: 1965  MRN: [id number]    Date of Procedure: 8/29/2023    Pre-Op Diagnosis Codes:     * Recurrent UTI [N39.0]    Post-Op Diagnosis: interstitial cystitis       Procedure(s):  CYSTOURETHROSCOPY    Surgeon(s):  Nati Valle MD    Assistant:   Surgical Assistant: Amanda Camarena    Anesthesia: General    Estimated Blood Loss (mL): Minimal    Complications: None    Specimens:   * No specimens in log *    Implants:  * No implants in log *      Drains: * No LDAs found *    Findings:     Patient had small capacity bladder, after released water in the bladder she started to bleed throughout the bladder ,petechiae were consistent with interstitial cystitis    Detailed Description of Procedure:   Patient was prepped and draped in usual sterile fashion after undergoing anesthesia placed on the position timeout performed preoperative marks given SCDs were applied. She was scoped with normal scope. Normal urethra no stones tumors in the bladder clear reflux orifices no fistula seen. After filling her bladder, I emptied  the bladder and then rescoped her and she had many petechiae. I then put lidocaine in her bladder.   Took her off the table to recovery area without complication    Electronically signed by Nati Valle MD on 8/29/2023 at 8:52 AM

## 2023-08-30 ENCOUNTER — TELEPHONE (OUTPATIENT)
Age: 58
End: 2023-08-30

## 2023-08-30 NOTE — TELEPHONE ENCOUNTER
Pt contacted the office stateing the methanamine was out of stock at [facility].  Per Carlton Fernandez she is going to send something else

## 2023-08-31 RX ORDER — URINARY ANTISEPTIC ANTISPASMODIC 81.6; 40.8; 10.8; .12 MG/1; MG/1; MG/1; MG/1
TABLET ORAL
Qty: 30 TABLET | Refills: 0 | Status: SHIPPED | OUTPATIENT
Start: 2023-08-31

## 2023-09-18 PROBLEM — L90.0 LICHEN SCLEROSUS: Status: ACTIVE | Noted: 2022-03-16

## 2023-09-18 PROBLEM — Z87.448 HISTORY OF HEMATURIA: Status: ACTIVE | Noted: 2023-08-09

## 2023-09-18 PROBLEM — N30.10 INTERSTITIAL CYSTITIS: Status: ACTIVE | Noted: 2023-09-18

## 2023-09-28 PROBLEM — N39.0 URINARY TRACT INFECTION: Status: RESOLVED | Noted: 2023-08-29 | Resolved: 2023-09-28

## 2023-10-05 PROBLEM — N35.92 STRICTURE OF FEMALE URETHRA: Status: ACTIVE | Noted: 2021-06-14

## 2023-10-05 PROBLEM — N95.2 POST-MENOPAUSAL ATROPHIC VAGINITIS: Status: ACTIVE | Noted: 2022-03-16

## 2024-02-08 ENCOUNTER — TELEPHONE (OUTPATIENT)
Age: 59
End: 2024-02-08

## 2024-02-08 DIAGNOSIS — Z12.31 SCREENING MAMMOGRAM, ENCOUNTER FOR: Primary | ICD-10-CM

## 2024-02-08 NOTE — TELEPHONE ENCOUNTER
Spoke with the patient and her order was entered and faxed to [facility].  She will schedule her own appointment and was made aware her last one was done on 05/01/23.

## 2024-02-14 ENCOUNTER — TELEPHONE (OUTPATIENT)
Age: 59
End: 2024-02-14

## 2024-02-14 NOTE — TELEPHONE ENCOUNTER
Patient called stating she had tried to schedule her mammogram at [facility] and was told there was no order there for her.  Advised her her order was submitted on 02/08/24.  Contacted [facility] and was told they have the order.  Advised the patient she may contact the scheduling department for [facility] at [phone].

## 2024-03-14 ENCOUNTER — OFFICE VISIT (OUTPATIENT)
Age: 59
End: 2024-03-14
Payer: MEDICARE

## 2024-03-14 ENCOUNTER — TELEPHONE (OUTPATIENT)
Age: 59
End: 2024-03-14

## 2024-03-14 VITALS
WEIGHT: 137 LBS | HEART RATE: 80 BPM | DIASTOLIC BLOOD PRESSURE: 60 MMHG | OXYGEN SATURATION: 96 % | RESPIRATION RATE: 16 BRPM | HEIGHT: 64 IN | SYSTOLIC BLOOD PRESSURE: 118 MMHG | BODY MASS INDEX: 23.39 KG/M2

## 2024-03-14 DIAGNOSIS — Z12.4 ENCOUNTER FOR SCREENING FOR MALIGNANT NEOPLASM OF CERVIX: ICD-10-CM

## 2024-03-14 DIAGNOSIS — N76.0 VAGINITIS AND VULVOVAGINITIS: ICD-10-CM

## 2024-03-14 DIAGNOSIS — Z01.419 GYNECOLOGIC EXAM NORMAL: Primary | ICD-10-CM

## 2024-03-14 PROCEDURE — G8484 FLU IMMUNIZE NO ADMIN: HCPCS | Performed by: OBSTETRICS & GYNECOLOGY

## 2024-03-14 PROCEDURE — 99396 PREV VISIT EST AGE 40-64: CPT | Performed by: OBSTETRICS & GYNECOLOGY

## 2024-03-14 RX ORDER — TERIFLUNOMIDE 14 MG/1
14 TABLET, FILM COATED ORAL DAILY
COMMUNITY

## 2024-03-14 NOTE — TELEPHONE ENCOUNTER
Patient contacted the office reports she needs instructions as to how to use cream prescribed for her today at [facility].  Advised to use one applicator full at night.

## 2024-03-14 NOTE — PROGRESS NOTES
Intimate Partner Violence: Not on file   Housing Stability: Not on file         HPI  Annual exam    Review of Systems   All other systems reviewed and are negative.       /60 (Site: Right Upper Arm, Position: Sitting)   Pulse 80   Resp 16   Ht 1.626 m (5' 4\")   Wt 62.1 kg (137 lb)   SpO2 96%   BMI 23.52 kg/m²   OBGyn Exam   Constitutional:     General Appearance: healthy-appearing, well-nourished, and well-developed;  Level of Distress: NAD. Ambulation: ambulating normally.     Psychiatric:   Insight: good judgement.   Mental Status: normal mood and affect and active and alert.   Orientation: to time, place, and person.   Memory: recent memory normal and remote memory normal.     Head: Head: normocephalic and atraumatic.     Neck:   Neck: supple, FROM, trachea midline, and no masses.   Lymph Nodes: no cervical LAD, supraclavicular LAD, axillary LAD, or inguinal LAD.       Lungs:   Respiratory effort: no dyspnea.       Cardiovascular:     Pulses including femoral / pedal: normal throughout.     Breast: Breast: no masses or abnormal secretions and normal appearance.     Abdomen:   no tenderness, guarding, masses, rebound tenderness, or CVA tenderness and non-distended.       Female :   External genitalia: no lesions or rash and normal.   Vagina: thin mucosa; scant discharge.   Cervix: no discharge, inflammation, or cervical motion tenderness   Uterus: midline, smooth, and non-tender; normal size   Adnexae: no adnexal mass or tenderness and size WNL.   Bladder and Urethra: normal bladder and urethra (except where noted).     Skin:   Inspection and palpation: no rash, lesions, ulcer, induration, nodules, jaundice, or abnormal nevi and good turgor. Nails: normal.     Back: Thoracolumbar Appearance: normal curvature.     1. Gynecologic exam normal    - PAP LB, Reflex HPV ASCUS ([id number]) ([facility])    2.Encounter for screening for malignant neoplasm of cervix      3. Vaginitis and vulvovaginitis    -

## 2024-03-18 LAB
., LABCORP: NORMAL
CYTOLOGIST CVX/VAG CYTO: NORMAL
CYTOLOGY CVX/VAG DOC CYTO: NORMAL
CYTOLOGY CVX/VAG DOC THIN PREP: NORMAL
DX ICD CODE: NORMAL
Lab: NORMAL
OTHER STN SPEC: NORMAL
STAT OF ADQ CVX/VAG CYTO-IMP: NORMAL

## 2024-04-09 ENCOUNTER — TELEPHONE (OUTPATIENT)
Age: 59
End: 2024-04-09

## 2024-04-09 DIAGNOSIS — N76.0 VAGINITIS AND VULVOVAGINITIS: ICD-10-CM

## 2024-04-09 NOTE — TELEPHONE ENCOUNTER
Patient contacted the office requesting to have refill on cream for yeast provider prescribed her last month.  She reports itching. New prescription sent.

## 2024-07-26 ENCOUNTER — HOSPITAL ENCOUNTER (EMERGENCY)
Facility: HOSPITAL | Age: 59
Discharge: HOME | End: 2024-07-26
Attending: EMERGENCY MEDICINE
Payer: MEDICARE

## 2024-07-26 ENCOUNTER — APPOINTMENT (OUTPATIENT)
Facility: HOSPITAL | Age: 59
End: 2024-07-26
Payer: MEDICARE

## 2024-07-26 VITALS
BODY MASS INDEX: 23.05 KG/M2 | SYSTOLIC BLOOD PRESSURE: 155 MMHG | TEMPERATURE: 98.6 F | OXYGEN SATURATION: 98 % | RESPIRATION RATE: 18 BRPM | DIASTOLIC BLOOD PRESSURE: 72 MMHG | WEIGHT: 135 LBS | HEART RATE: 82 BPM | HEIGHT: 64 IN

## 2024-07-26 DIAGNOSIS — S93.602A SPRAIN OF LEFT FOOT, INITIAL ENCOUNTER: Primary | ICD-10-CM

## 2024-07-26 PROCEDURE — 99283 EMERGENCY DEPT VISIT LOW MDM: CPT

## 2024-07-26 PROCEDURE — 73630 X-RAY EXAM OF FOOT: CPT

## 2024-07-26 NOTE — ED TRIAGE NOTES
Left foot pain  after stepping on object and twisting ankle 2 weeks ago, painful to walk, not swollen or bruised

## 2024-07-26 NOTE — ED PROVIDER NOTES
[facility] EMERGENCY DEPT  EMERGENCY DEPARTMENT HISTORY AND PHYSICAL EXAM      Date: 7/26/2024  Patient Name: Junie Ortiz  MRN: [id number]  Birthdate 1965  Date of evaluation: 7/26/2024  Provider: Keshav Lehman MD   Note Started: 7:48 AM EDT 7/26/24    HISTORY OF PRESENT ILLNESS     Chief Complaint   Patient presents with    Foot Pain       History Provided By: Patient    HPI: Junie Ortiz is a 59 y.o. female presents for evaluation of left lateral foot pain.  Patient reports she had a twisting inversion injury of the ankle 2 weeks ago but did not have any problems at that time.  Patient states over the last few days she has noticed worsening pain over the lateral foot.  Denies numbness or tingling.    PAST MEDICAL HISTORY   Past Medical History:  Past Medical History:   Diagnosis Date    Arthritis     patient states not aware of this dx    Bladder outlet obstruction     Leisa-Danlos syndrome     GERD (gastroesophageal reflux disease)     Incomplete bladder emptying     MS (multiple sclerosis) (HCC)     MVP (mitral valve prolapse)     patient states told years ago, doesnt see cardiology    Recurrent UTI     UTI (urinary tract infection)        Past Surgical History:  Past Surgical History:   Procedure Laterality Date    COLONOSCOPY N/A 10/10/2022    COLONOSCOPY performed by Tisha Mike MD at [facility] ENDOSCOPY    CYSTOSCOPY N/A 8/29/2023    CYSTOURETHROSCOPY performed by Blake Flower MD at [facility] MAIN OR    CYSTOSTOMY      UROLOGICAL SURGERY  04/14/2021    URETHRAL DILATION    UROLOGICAL SURGERY  04/14/2021    BILATERAL  RETROGRADE PYELOGRAMS    UROLOGICAL SURGERY  04/14/2021    CYSTOURETHROSCOPY    WISDOM TOOTH EXTRACTION         Family History:  Family History   Problem Relation Age of Onset    Elevated Lipids Mother     Elevated Lipids Father     Cancer Father         PROSTATE    Hypertension Mother        Social History:  Social History     Tobacco Use    Smoking status: Never    Smokeless tobacco:

## 2024-07-26 NOTE — DISCHARGE INSTRUCTIONS
Thank you for choosing our Emergency Department for your care.  It is our privilege to care for you in your time of need.  In the next several days, you may receive a survey via email or mailed to your home about your experience with our team.  We would greatly appreciate you taking a few minutes to complete the survey, as we use this information to learn what we have done well and what we could be doing better. Thank you for trusting us with your care!    Below you will find a list of your tests from today's visit.   Labs  No results found for this or any previous visit (from the past 12 hour(s)).    Radiologic Studies  XR FOOT LEFT (MIN 3 VIEWS)   Final Result   No acute abnormality.      Electronically signed by Dennis Yepez        ------------------------------------------------------------------------------------------------------------  The evaluation and treatment you received in the Emergency Department were for an urgent problem. It is important that you follow-up with a doctor, nurse practitioner, or physician assistant to:  (1) confirm your diagnosis,  (2) re-evaluation of changes in your illness and treatment, and (3) for ongoing care. Please take your discharge instructions with you when you go to your follow-up appointment.     If you have any problem arranging a follow-up appointment, contact us!  If your symptoms become worse or you do not improve as expected, please return to us. We are available 24 hours a day.     If a prescription has been provided, please fill it as soon as possible to prevent a delay in treatment. If you have any questions or reservations about taking the medication due to side effects or interactions with other medications, please call your primary care provider or contact us directly.  Again, THANK YOU for choosing us to care for YOU!

## 2024-07-31 ENCOUNTER — OFFICE VISIT (OUTPATIENT)
Age: 59
End: 2024-07-31
Payer: MEDICARE

## 2024-07-31 VITALS
HEIGHT: 64 IN | BODY MASS INDEX: 23.9 KG/M2 | SYSTOLIC BLOOD PRESSURE: 121 MMHG | TEMPERATURE: 98.5 F | DIASTOLIC BLOOD PRESSURE: 74 MMHG | OXYGEN SATURATION: 98 % | WEIGHT: 139.99 LBS | HEART RATE: 73 BPM

## 2024-07-31 DIAGNOSIS — S93.402A MODERATE LEFT ANKLE SPRAIN, INITIAL ENCOUNTER: Primary | ICD-10-CM

## 2024-07-31 DIAGNOSIS — S93.602A FOOT SPRAIN, LEFT, INITIAL ENCOUNTER: ICD-10-CM

## 2024-07-31 DIAGNOSIS — D47.2 TAKATSUKI SYNDROME: ICD-10-CM

## 2024-07-31 DIAGNOSIS — R23.9 TAKATSUKI SYNDROME: ICD-10-CM

## 2024-07-31 DIAGNOSIS — S93.412A SPRAIN OF CALCANEOFIBULAR LIGAMENT OF LEFT ANKLE, INITIAL ENCOUNTER: ICD-10-CM

## 2024-07-31 DIAGNOSIS — S86.309A PERONEAL TENDON INJURY, INITIAL ENCOUNTER: ICD-10-CM

## 2024-07-31 DIAGNOSIS — G62.9 TAKATSUKI SYNDROME: ICD-10-CM

## 2024-07-31 DIAGNOSIS — Q79.60 EHLERS-DANLOS SYNDROME: ICD-10-CM

## 2024-07-31 DIAGNOSIS — E34.9 TAKATSUKI SYNDROME: ICD-10-CM

## 2024-07-31 DIAGNOSIS — G35 MULTIPLE SCLEROSIS (HCC): ICD-10-CM

## 2024-07-31 PROCEDURE — 99203 OFFICE O/P NEW LOW 30 MIN: CPT | Performed by: ORTHOPAEDIC SURGERY

## 2024-07-31 ASSESSMENT — PATIENT HEALTH QUESTIONNAIRE - PHQ9
SUM OF ALL RESPONSES TO PHQ QUESTIONS 1-9: 0
SUM OF ALL RESPONSES TO PHQ QUESTIONS 1-9: 0
1. LITTLE INTEREST OR PLEASURE IN DOING THINGS: NOT AT ALL
SUM OF ALL RESPONSES TO PHQ QUESTIONS 1-9: 0
SUM OF ALL RESPONSES TO PHQ QUESTIONS 1-9: 0
SUM OF ALL RESPONSES TO PHQ9 QUESTIONS 1 & 2: 0
2. FEELING DOWN, DEPRESSED OR HOPELESS: NOT AT ALL

## 2024-07-31 NOTE — PROGRESS NOTES
Subjective:      Patient ID: Junie Ortiz is a 59 y.o.  female.    Chief Complaint   Patient presents with    Foot Injury     Reason for Visit: NP, ED F/U Left Foot pain   Pain level: 2  Patient states stepped on a hickory nut and rolled her ankle         The patient is a 59-year-old female with history of MS and EDS, who presents today for evaluation of a left foot and ankle injury that occurred approximately 3 weeks ago when she had a inversion type injury to the ankle after stepping on a hickory nut.  She experienced an acute onset of pain but did not have any significant swelling or bruising.  She went to the [facility] emergency department on 7/26/2024 and was evaluated by Dr. Lehman.  X-rays of the foot did not show any acute fractures.  She was placed in a wrap, but no Aircast or boot was provided.  Since the injury, she has been fully weightbearing and bought an Ace wrap which seem to help some.  No history of previous injury to the left foot or ankle.  Denies any paresthesias or any weakness or loss of motion of the toes or ankle.    Social History     Occupational History    Not on file   Tobacco Use    Smoking status: Never     Passive exposure: Never    Smokeless tobacco: Never   Vaping Use    Vaping Use: Never used   Substance and Sexual Activity    Alcohol use: Not Currently    Drug use: Never    Sexual activity: Yes     Partners: Male      Past Medical History:   Diagnosis Date    Arthritis       patient states not aware of this dx    Bladder outlet obstruction      Leisa-Danlos syndrome      GERD (gastroesophageal reflux disease)      Incomplete bladder emptying      MS (multiple sclerosis) (HCC)      MVP (mitral valve prolapse)       patient states told years ago, doesnt see cardiology    Recurrent UTI      UTI (urinary tract infection)      Past Surgical History:   Procedure Laterality Date    COLONOSCOPY N/A 10/10/2022     COLONOSCOPY performed by Tisha Mike MD at [facility] ENDOSCOPY

## 2024-07-31 NOTE — PROGRESS NOTES
Identified pt with two pt identifiers (name and ). Reviewed chart in preparation for visit and have obtained necessary documentation.    Junie Ortiz is a 59 y.o. female  Chief Complaint   Patient presents with    Foot Injury     Reason for Visit: NP, ED F/U Left Foot pain   Pain level: 2  Patient states stepped on a hickory nut and rolled her ankle         /74 (Site: Left Upper Arm, Position: Sitting, Cuff Size: Medium Adult)   Pulse 73   Temp 98.5 °F (36.9 °C) (Oral)   Ht 1.626 m (5' 4\")   Wt 63.5 kg (139 lb 15.9 oz)   SpO2 98%   BMI 24.03 kg/m²     1. Have you been to the ER, urgent care clinic since your last visit?  Hospitalized since your last visit?yes - [facility], 24, Left Foot pain     2. Have you seen or consulted any other health care providers outside of the [facility] since your last visit?  Include any pap smears or colon screening. no

## 2024-08-02 DIAGNOSIS — S93.402A MODERATE LEFT ANKLE SPRAIN, INITIAL ENCOUNTER: Primary | ICD-10-CM

## 2024-09-12 ENCOUNTER — HOSPITAL ENCOUNTER (EMERGENCY)
Facility: HOSPITAL | Age: 59
Discharge: HOME | End: 2024-09-12
Attending: STUDENT IN AN ORGANIZED HEALTH CARE EDUCATION/TRAINING PROGRAM
Payer: MEDICARE

## 2024-09-12 ENCOUNTER — APPOINTMENT (OUTPATIENT)
Facility: HOSPITAL | Age: 59
End: 2024-09-12
Payer: MEDICARE

## 2024-09-12 ENCOUNTER — HOSPITAL ENCOUNTER (OUTPATIENT)
Facility: HOSPITAL | Age: 59
Discharge: HOME | End: 2024-09-14
Payer: MEDICARE

## 2024-09-12 ENCOUNTER — TRANSCRIBE ORDERS (OUTPATIENT)
Facility: HOSPITAL | Age: 59
End: 2024-09-12

## 2024-09-12 VITALS
DIASTOLIC BLOOD PRESSURE: 69 MMHG | RESPIRATION RATE: 18 BRPM | SYSTOLIC BLOOD PRESSURE: 133 MMHG | WEIGHT: 140 LBS | HEART RATE: 76 BPM | HEIGHT: 64 IN | OXYGEN SATURATION: 99 % | BODY MASS INDEX: 23.9 KG/M2 | TEMPERATURE: 98.7 F

## 2024-09-12 DIAGNOSIS — M25.571 ACUTE RIGHT ANKLE PAIN: Primary | ICD-10-CM

## 2024-09-12 DIAGNOSIS — R60.9 SWELLING: Primary | ICD-10-CM

## 2024-09-12 DIAGNOSIS — R60.9 SWELLING: ICD-10-CM

## 2024-09-12 DIAGNOSIS — M25.471 ANKLE SWELLING, RIGHT: ICD-10-CM

## 2024-09-12 LAB — ECHO BSA: 1.69 M2

## 2024-09-12 PROCEDURE — 93971 EXTREMITY STUDY: CPT

## 2024-09-12 PROCEDURE — 73610 X-RAY EXAM OF ANKLE: CPT

## 2024-09-12 PROCEDURE — 99283 EMERGENCY DEPT VISIT LOW MDM: CPT

## 2024-09-12 ASSESSMENT — PAIN SCALES - GENERAL: PAINLEVEL_OUTOF10: 2

## 2024-09-12 ASSESSMENT — PAIN - FUNCTIONAL ASSESSMENT: PAIN_FUNCTIONAL_ASSESSMENT: 0-10

## 2024-09-19 ENCOUNTER — OFFICE VISIT (OUTPATIENT)
Age: 59
End: 2024-09-19
Payer: MEDICARE

## 2024-09-19 VITALS
BODY MASS INDEX: 23.71 KG/M2 | TEMPERATURE: 98.6 F | OXYGEN SATURATION: 97 % | SYSTOLIC BLOOD PRESSURE: 118 MMHG | HEART RATE: 75 BPM | DIASTOLIC BLOOD PRESSURE: 76 MMHG | WEIGHT: 138.89 LBS | HEIGHT: 64 IN | RESPIRATION RATE: 16 BRPM

## 2024-09-19 DIAGNOSIS — G35 MULTIPLE SCLEROSIS (HCC): ICD-10-CM

## 2024-09-19 DIAGNOSIS — Q79.60 EHLERS-DANLOS SYNDROME: ICD-10-CM

## 2024-09-19 DIAGNOSIS — S93.602D FOOT SPRAIN, LEFT, SUBSEQUENT ENCOUNTER: ICD-10-CM

## 2024-09-19 DIAGNOSIS — S93.412D SPRAIN OF CALCANEOFIBULAR LIGAMENT OF LEFT ANKLE, SUBSEQUENT ENCOUNTER: ICD-10-CM

## 2024-09-19 DIAGNOSIS — E34.9 TAKATSUKI SYNDROME: ICD-10-CM

## 2024-09-19 DIAGNOSIS — S93.402D MODERATE LEFT ANKLE SPRAIN, SUBSEQUENT ENCOUNTER: Primary | ICD-10-CM

## 2024-09-19 DIAGNOSIS — M67.88 RIGHT PERONEAL TENDINOSIS: ICD-10-CM

## 2024-09-19 DIAGNOSIS — G62.9 TAKATSUKI SYNDROME: ICD-10-CM

## 2024-09-19 DIAGNOSIS — D47.2 TAKATSUKI SYNDROME: ICD-10-CM

## 2024-09-19 DIAGNOSIS — R23.9 TAKATSUKI SYNDROME: ICD-10-CM

## 2024-09-19 DIAGNOSIS — R60.0 EDEMA OF RIGHT LOWER EXTREMITY: ICD-10-CM

## 2024-09-19 PROCEDURE — 99214 OFFICE O/P EST MOD 30 MIN: CPT | Performed by: ORTHOPAEDIC SURGERY

## 2024-11-11 ENCOUNTER — OFFICE VISIT (OUTPATIENT)
Age: 59
End: 2024-11-11
Payer: MEDICARE

## 2024-11-11 VITALS
WEIGHT: 138 LBS | SYSTOLIC BLOOD PRESSURE: 124 MMHG | OXYGEN SATURATION: 96 % | HEIGHT: 64 IN | BODY MASS INDEX: 23.56 KG/M2 | DIASTOLIC BLOOD PRESSURE: 60 MMHG | HEART RATE: 64 BPM | RESPIRATION RATE: 16 BRPM

## 2024-11-11 DIAGNOSIS — N76.0 VAGINITIS AND VULVOVAGINITIS: Primary | ICD-10-CM

## 2024-11-11 PROCEDURE — G8484 FLU IMMUNIZE NO ADMIN: HCPCS | Performed by: OBSTETRICS & GYNECOLOGY

## 2024-11-11 PROCEDURE — 1036F TOBACCO NON-USER: CPT | Performed by: OBSTETRICS & GYNECOLOGY

## 2024-11-11 PROCEDURE — G8420 CALC BMI NORM PARAMETERS: HCPCS | Performed by: OBSTETRICS & GYNECOLOGY

## 2024-11-11 PROCEDURE — 3017F COLORECTAL CA SCREEN DOC REV: CPT | Performed by: OBSTETRICS & GYNECOLOGY

## 2024-11-11 PROCEDURE — G8427 DOCREV CUR MEDS BY ELIG CLIN: HCPCS | Performed by: OBSTETRICS & GYNECOLOGY

## 2024-11-11 PROCEDURE — 99213 OFFICE O/P EST LOW 20 MIN: CPT | Performed by: OBSTETRICS & GYNECOLOGY

## 2024-11-11 RX ORDER — FLUCONAZOLE 150 MG/1
150 TABLET ORAL ONCE
Qty: 1 TABLET | Refills: 0 | Status: SHIPPED | OUTPATIENT
Start: 2024-11-11 | End: 2024-11-11

## 2024-11-11 RX ORDER — ALENDRONATE SODIUM 70 MG/1
70 TABLET ORAL
Qty: 4 TABLET | Refills: 11 | Status: SHIPPED | OUTPATIENT
Start: 2024-11-11

## 2024-11-11 RX ORDER — CLOTRIMAZOLE AND BETAMETHASONE DIPROPIONATE 10; .64 MG/G; MG/G
CREAM TOPICAL
Qty: 45 G | Refills: 2 | Status: SHIPPED | OUTPATIENT
Start: 2024-11-11

## 2024-11-27 NOTE — PROGRESS NOTES
(Patient not taking: Reported on 3/14/2024) 30 tablet 0   • Meth-Hyo-M Bl-Na Phos-Ph Sal (METHENAMINE-NABISPHOSPHATE-PHENYL SALICYLATE-METHYLENE BLUE-HYOSCYAMINE) 81.6 MG TABS tablet Take 1 tablet by mouth 4 times daily (Patient not taking: Reported on 3/14/2024) 30 tablet 1     No current facility-administered medications for this visit.         Past Medical History:   Diagnosis Date   • Arthritis     patient states not aware of this dx   • Bladder outlet obstruction    • Leisa-Danlos syndrome    • GERD (gastroesophageal reflux disease)    • Incomplete bladder emptying    • MS (multiple sclerosis) (HCC)    • MVP (mitral valve prolapse)     patient states told years ago, doesnt see cardiology   • Recurrent UTI    • UTI (urinary tract infection)        Past Surgical History:   Procedure Laterality Date   • COLONOSCOPY N/A 10/10/2022    COLONOSCOPY performed by Tisha Mike MD at [facility] ENDOSCOPY   • CYSTOSCOPY N/A 8/29/2023    CYSTOURETHROSCOPY performed by Blake Flower MD at [facility] MAIN OR   • CYSTOSTOMY     • UROLOGICAL SURGERY  04/14/2021    URETHRAL DILATION   • UROLOGICAL SURGERY  04/14/2021    BILATERAL  RETROGRADE PYELOGRAMS   • UROLOGICAL SURGERY  04/14/2021    CYSTOURETHROSCOPY   • WISDOM TOOTH EXTRACTION         Family History   Problem Relation Age of Onset   • Elevated Lipids Mother    • Elevated Lipids Father    • Cancer Father         PROSTATE   • Hypertension Mother        Social History     Socioeconomic History   • Marital status:      Spouse name: Not on file   • Number of children: Not on file   • Years of education: Not on file   • Highest education level: Not on file   Occupational History   • Not on file   Tobacco Use   • Smoking status: Never     Passive exposure: Never   • Smokeless tobacco: Never   Vaping Use   • Vaping status: Never Used   Substance and Sexual Activity   • Alcohol use: Not Currently   • Drug use: Never   • Sexual activity: Yes     Partners: Male   Other Topics Concern

## 2025-03-16 NOTE — BRIEF OP NOTE
Brief Postoperative Note    Patient: Danica Lora  YOB: 1965  MRN: [id number]    Date of Procedure: 4/14/2021     Pre-Op Diagnosis: RECURRENT UTI/BLADDER OUTLET OBSTRUCTION    Post-Op Diagnosis: Same as preoperative diagnosis. and urethral stenosis      Procedure(s):  CYSTOURETHROSCOPY/URETHRAL DILATION/BILATERAL  RETROGRADE PYELOGRAMS    Surgeon(s):  Soha Stewart MD    Surgical Assistant: None    Anesthesia: General     Estimated Blood Loss (mL): Minimal    Complications: None    Specimens:   ID Type Source Tests Collected by Time Destination   1 : Urine Urine Bladder CULTURE, URINE Soha Stewart MD 4/14/2021 [facility]        Implants: none  Drains: none    Findings: urethral stenosis 20 Fr. Dilated to 38Fr.;  Mild bladder inflammation, normal,upper tracts    Electronically Signed by Bruce Hammonds MD on 4/14/2021 at 9:46 AM
Face Mask

## 2025-04-02 ENCOUNTER — OFFICE VISIT (OUTPATIENT)
Age: 60
End: 2025-04-02
Payer: MEDICARE

## 2025-04-02 VITALS
WEIGHT: 142 LBS | HEART RATE: 70 BPM | HEIGHT: 64 IN | BODY MASS INDEX: 24.24 KG/M2 | OXYGEN SATURATION: 98 % | DIASTOLIC BLOOD PRESSURE: 43 MMHG | RESPIRATION RATE: 18 BRPM | SYSTOLIC BLOOD PRESSURE: 112 MMHG

## 2025-04-02 DIAGNOSIS — M79.10 MUSCLE PAIN: Primary | ICD-10-CM

## 2025-04-02 PROCEDURE — G8427 DOCREV CUR MEDS BY ELIG CLIN: HCPCS | Performed by: OBSTETRICS & GYNECOLOGY

## 2025-04-02 PROCEDURE — 1036F TOBACCO NON-USER: CPT | Performed by: OBSTETRICS & GYNECOLOGY

## 2025-04-02 PROCEDURE — 99213 OFFICE O/P EST LOW 20 MIN: CPT | Performed by: OBSTETRICS & GYNECOLOGY

## 2025-04-02 PROCEDURE — 3017F COLORECTAL CA SCREEN DOC REV: CPT | Performed by: OBSTETRICS & GYNECOLOGY

## 2025-04-02 PROCEDURE — G8420 CALC BMI NORM PARAMETERS: HCPCS | Performed by: OBSTETRICS & GYNECOLOGY

## (undated) DEVICE — WASTEBAG DRIP/ADAPTER: Brand: MEDLINE INDUSTRIES, INC.

## (undated) DEVICE — GLIDESHEATH SLENDER ACCESS KIT: Brand: GLIDESHEATH SLENDER

## (undated) DEVICE — CYSTO PACK: Brand: MEDLINE INDUSTRIES, INC.

## (undated) DEVICE — SOLUTION SCRB 4OZ 4% CHG H2O AIDED FOR PREOPERATIVE SKIN

## (undated) DEVICE — DRESSING HEMOSTATIC SFT INTVENT W/O SLT DBL WRP QUIKCLOT LF

## (undated) DEVICE — SOLUTION IRRIG 500ML STRL H2O NONPYROGENIC

## (undated) DEVICE — BAG,DRAINAGE,ANTI-REFLUX TOWER,2000ML: Brand: MEDLINE

## (undated) DEVICE — STERILE POLYISOPRENE POWDER-FREE SURGICAL GLOVES: Brand: PROTEXIS

## (undated) DEVICE — PREP PAD BNS: Brand: CONVERTORS

## (undated) DEVICE — CATHETER ANGIO JR4 PIG 145 DEG 6 FRX100 CM MP SUPER TORQUE +

## (undated) DEVICE — SURGICAL PROCEDURE TRAY CRD CATH 3 PRT

## (undated) DEVICE — SYRINGE MED 10ML PUR GAM COMPATIBLE POLYCARB FIX M LUER CONN

## (undated) DEVICE — PINNACLE INTRODUCER SHEATH: Brand: PINNACLE

## (undated) DEVICE — PAD,PREPPING,CUFFED,24X48,7",NONSTERILE: Brand: MEDLINE

## (undated) DEVICE — MERCY FAIRFIELD TURNOVER KIT: Brand: MEDLINE INDUSTRIES, INC.

## (undated) DEVICE — CANNULA NSL O2 AD 7 FT END-TIDAL CARBON DIOX VENTFLO

## (undated) DEVICE — SYRINGE MED 10ML RED POLYCARB BRL FIX M LUER CONN FLAT GRP

## (undated) DEVICE — PERCLOSE PROGLIDE™ SUTURE-MEDIATED CLOSURE SYSTEM: Brand: PERCLOSE PROGLIDE™

## (undated) DEVICE — MASK ANES INF SZ 2 PREM TAIL VLV INFL PRT UNSCENTED SGL PT

## (undated) DEVICE — ENDOSCOPIC KIT 1.1+ DE BOWL

## (undated) DEVICE — VINYL ACETATE UROLOGICAL DRAINAGE BAG FOR GE/OEC SYSTEMS W/ 8MM PLUG - NON-STERILE: Brand: CUSTOM MEDICAL SPECIALTIES, INC.

## (undated) DEVICE — SOLUTION IRRIG 500ML 0.9% SOD CHL USP POUR PLAS BTL

## (undated) DEVICE — LULU RADIAL/FEMORAL ANGIOGRAPHY SHEET: Brand: CONVERTORS

## (undated) DEVICE — GLOVE ORANGE PI 7 1/2   MSG9075

## (undated) DEVICE — LIGATOR ENDOSCP 2.8 MM 8.6-11.5 MM SPDBND SUPERVIEW SUPER 7

## (undated) DEVICE — TUBING, SUCTION, 1/4" X 12', STRAIGHT: Brand: MEDLINE

## (undated) DEVICE — SOLUTION IRRIG 3000ML 0.9% SOD CHL USP UROMATIC PLAS CONT

## (undated) DEVICE — 3M™ TEGADERM™ TRANSPARENT FILM DRESSING FRAME STYLE, 1626W, 4 IN X 4-3/4 IN (10 CM X 12 CM), 50/CT 4CT/CASE: Brand: 3M™ TEGADERM™

## (undated) DEVICE — SOLUTION IRRIGATION SODIUM CHL 0.9% TITAN XL CONTAINER 4/CA

## (undated) DEVICE — GUIDEWIRE VASC L150CM DIA0.035IN TIP L3MM PTFE J CRV FIX

## (undated) DEVICE — GUIDEWIRE VASC L260CM DIA0.035IN TIP L3MM STD EXCHG PTFE J

## (undated) DEVICE — GAUZE,SPONGE,4"X4",16PLY,STRL,LF,10/TRAY: Brand: MEDLINE

## (undated) DEVICE — Device

## (undated) DEVICE — GARMENT,MEDLINE,DVT,INT,CALF,MED, GEN2: Brand: MEDLINE

## (undated) DEVICE — CATHETER URET 5FR L70CM POLYUR CONE FLX TIP KINK RESIST W/